# Patient Record
Sex: FEMALE | Race: WHITE | HISPANIC OR LATINO | Employment: OTHER | URBAN - METROPOLITAN AREA
[De-identification: names, ages, dates, MRNs, and addresses within clinical notes are randomized per-mention and may not be internally consistent; named-entity substitution may affect disease eponyms.]

---

## 2018-12-05 ENCOUNTER — OFFICE VISIT (OUTPATIENT)
Dept: FAMILY MEDICINE CLINIC | Facility: CLINIC | Age: 58
End: 2018-12-05
Payer: COMMERCIAL

## 2018-12-05 VITALS
BODY MASS INDEX: 17.19 KG/M2 | HEART RATE: 65 BPM | WEIGHT: 97 LBS | RESPIRATION RATE: 16 BRPM | OXYGEN SATURATION: 98 % | DIASTOLIC BLOOD PRESSURE: 106 MMHG | HEIGHT: 63 IN | TEMPERATURE: 96.3 F | SYSTOLIC BLOOD PRESSURE: 160 MMHG

## 2018-12-05 DIAGNOSIS — Z12.31 ENCOUNTER FOR SCREENING MAMMOGRAM FOR BREAST CANCER: ICD-10-CM

## 2018-12-05 DIAGNOSIS — Z23 NEED FOR VACCINATION: ICD-10-CM

## 2018-12-05 DIAGNOSIS — Z98.890 STATUS POST LUMBAR SPINE SURGERY FOR DECOMPRESSION OF SPINAL CORD: ICD-10-CM

## 2018-12-05 DIAGNOSIS — R63.6 UNDERWEIGHT: ICD-10-CM

## 2018-12-05 DIAGNOSIS — Z00.01 ENCOUNTER FOR WELL ADULT EXAM WITH ABNORMAL FINDINGS: ICD-10-CM

## 2018-12-05 DIAGNOSIS — I10 ESSENTIAL HYPERTENSION: ICD-10-CM

## 2018-12-05 DIAGNOSIS — N39.42 URINARY INCONTINENCE WITHOUT SENSORY AWARENESS: ICD-10-CM

## 2018-12-05 DIAGNOSIS — M62.561 ATROPHY OF MUSCLE OF RIGHT LOWER LEG: ICD-10-CM

## 2018-12-05 DIAGNOSIS — Z76.89 ENCOUNTER TO ESTABLISH CARE: Primary | ICD-10-CM

## 2018-12-05 PROCEDURE — 99386 PREV VISIT NEW AGE 40-64: CPT | Performed by: FAMILY MEDICINE

## 2018-12-05 PROCEDURE — 3725F SCREEN DEPRESSION PERFORMED: CPT | Performed by: FAMILY MEDICINE

## 2018-12-05 RX ORDER — ATENOLOL 100 MG/1
100 TABLET ORAL DAILY
COMMUNITY
End: 2018-12-05 | Stop reason: ALTCHOICE

## 2018-12-05 RX ORDER — METOPROLOL SUCCINATE 100 MG/1
100 TABLET, EXTENDED RELEASE ORAL DAILY
Qty: 30 TABLET | Refills: 2 | Status: SHIPPED | OUTPATIENT
Start: 2018-12-05 | End: 2019-08-05 | Stop reason: SDUPTHER

## 2018-12-05 RX ORDER — METOPROLOL SUCCINATE 100 MG/1
100 TABLET, EXTENDED RELEASE ORAL DAILY
COMMUNITY
End: 2018-12-05 | Stop reason: SDUPTHER

## 2018-12-05 NOTE — ASSESSMENT & PLAN NOTE
Patient states she had a surgery about 15 years ago due to spinal cord compression and she developed sensation changes in the right lower extremity since then  She developed muscle atrophy and deformity in her right food gradually  The developed deformity effects patient's gait and walking  Will refer patient to physical therapy for evaluation of the gait, and she may be candidate for modified ankle/ foot orthotics  Follow-up in 3 months

## 2018-12-05 NOTE — ASSESSMENT & PLAN NOTE
Patient is establishing care, she was on atenolol and metoprolol that was started in [facility] as per patient  Will discontinue atenolol as both medications are the same group, continue metoprolol for now  Blood pressure currently controlled   No complaints of adverse effects, including visual changes, dizziness, headaches or syncope  Encouraged diet and exercise regimen as tolerated

## 2018-12-05 NOTE — PROGRESS NOTES
Assessment/Plan:    Essential hypertension  Patient is establishing care, she was on atenolol and metoprolol that was started in [place] as per patient  Will discontinue atenolol as both medications are the same group, continue metoprolol for now  Blood pressure currently controlled   No complaints of adverse effects, including visual changes, dizziness, headaches or syncope  Encouraged diet and exercise regimen as tolerated      Atrophy of muscle of right lower leg  Patient states she had a surgery about 15 years ago due to spinal cord compression and she developed sensation changes in the right lower extremity since then  She developed muscle atrophy and deformity in her right food gradually  The developed deformity effects patient's gait and walking  Will refer patient to physical therapy for evaluation of the gait, and she may be candidate for modified ankle/ foot orthotics  Follow-up in 3 months      Urinary incontinence without sensory awareness  Patient is status post spinal surgery about 15 years ago and states that she developed urinary incontinence since then, she is not able to hold her urine most of the times, and not able to make it to the bathroom in time  Scheduled urination hourly discussed with the patient    Encounter for well adult exam with abnormal findings  For patient establishing care after moving from [facility] about 9 months ago  She has been seen by a doctor for many years as per patient  She is status post spinal surgery, seems like due to spinal compression, as per patient's she developed urinary and defecation problems and loss of right foot sensation after the surgery  Will order mammogram and Pap test as patient does remember when was the last time she had those done  Patient is underweight, will check CBC, TSH, CMP, lipid panel to look for possible underlying cause       Diagnoses and all orders for this visit:    Encounter to establish care    Need for vaccination  -     TDAP VACCINE GREATER THAN OR EQUAL TO 6YO IM  -     influenza vaccine, 2478-5315, quadrivalent, recombinant, PF, 0 5 mL, for patients 18 yr+ (FLUBLOK)    Encounter for screening mammogram for breast cancer  -     Mammo screening bilateral w cad; Future    Status post lumbar spine surgery for decompression of spinal cord  -     Ambulatory referral to Physical Therapy; Future    Atrophy of muscle of right lower leg  -     Ambulatory referral to Physical Therapy; Future    Underweight  -     CBC and differential; Future  -     Lipid panel; Future  -     TSH, 3rd generation with Free T4 reflex; Future  -     Comprehensive metabolic panel; Future    Urinary incontinence without sensory awareness    Essential hypertension  -     metoprolol succinate (TOPROL-XL) 100 mg 24 hr tablet; Take 1 tablet (100 mg total) by mouth daily    Encounter for well adult exam with abnormal findings    Other orders  -     Discontinue: metoprolol succinate (TOPROL-XL) 100 mg 24 hr tablet; Take 100 mg by mouth daily  -     Discontinue: atenolol (TENORMIN) 100 mg tablet; Take 100 mg by mouth daily          Subjective:      Patient ID: Steve Phillips is a 66 y o  female  Patient presents to [facility] care  She moved from [facility] in March 2018 and she is Kiswahili speaking , but history is provided by her   They state that about 15 years ago patient had a spinal surgery because she had urinary retention and was found to have spinal cord compression  She states that since then she developed problem with urination and moving her bowel; she does not have sensation when to move her bowel, but she goes to the bathroom 2 times a day on her own without urgency  She also has problem with urination, with no urgency to urinate and decreased sensation  She starts to urinate spontaneously, but able to stop and then she goes to the bathroom  She did not follow up and she was not seen by a doctor after the surgery     Post surgery she also developed sensation changes in her right foot, she lost sensation to the touch from ankle and below, pain sensation is intact, there is muscle atrophy in the right foot  Patient does remember when she had a last mammogram and Pap test done  She denies smoking, alcohol, drug use  The other medical history include hypertension, patient was on atenolol and metoprolol started in [place] 15 years ago as per patient  The following portions of the patient's history were reviewed and updated as appropriate: allergies, current medications, past family history, past medical history, past social history, past surgical history and problem list     Review of Systems   Constitutional: Negative for chills, diaphoresis, fatigue and fever  HENT: Negative for congestion, ear discharge, ear pain, mouth sores, rhinorrhea, sore throat and trouble swallowing  Eyes: Negative for photophobia, pain and discharge  Respiratory: Negative for cough, chest tightness, shortness of breath and wheezing  Cardiovascular: Negative for chest pain, palpitations and leg swelling  Gastrointestinal: Negative for abdominal distention, abdominal pain, blood in stool, constipation, diarrhea and nausea  Genitourinary: Positive for difficulty urinating  Negative for frequency  Musculoskeletal: Negative for joint swelling and neck stiffness  Skin: Negative for color change, pallor and rash  Neurological: Positive for weakness (right foot) and numbness (no sensation in the right foot below the ankle)  Negative for dizziness, syncope and headaches  Objective:      BP (!) 160/106 (BP Location: Right arm, Patient Position: Sitting, Cuff Size: Child)   Pulse 65   Temp (!) 96 3 °F (35 7 °C) (Tympanic)   Resp 16   Ht 5' 3" (1 6 m)   Wt 44 kg (97 lb)   SpO2 98%   Breastfeeding? No   BMI 17 18 kg/m²          Physical Exam   Constitutional: She is oriented to person, place, and time   She appears well-developed and well-nourished  No distress  HENT:   Head: Normocephalic and atraumatic  Eyes: Pupils are equal, round, and reactive to light  EOM are normal  No scleral icterus  Neck: Normal range of motion  Neck supple  Cardiovascular: Normal rate, regular rhythm and normal heart sounds  Exam reveals no gallop and no friction rub  No murmur heard  Pulmonary/Chest: Effort normal and breath sounds normal  No respiratory distress  She has no wheezes  She has no rales  She exhibits no tenderness  Abdominal: Soft  Bowel sounds are normal  She exhibits no distension  There is no tenderness  There is no rebound  Musculoskeletal: She exhibits no edema or tenderness  Right foot: There is normal range of motion, no tenderness, no swelling and no laceration  Left foot: Normal         Feet:    Lymphadenopathy:     She has no cervical adenopathy  Neurological: She is alert and oriented to person, place, and time  Skin: Skin is warm and dry  No rash noted  No erythema  No pallor  Psychiatric: She has a normal mood and affect

## 2018-12-05 NOTE — ASSESSMENT & PLAN NOTE
Patient is status post spinal surgery about 15 years ago and states that she developed urinary incontinence since then, she is not able to hold her urine most of the times, and not able to make it to the bathroom in time  Scheduled urination hourly discussed with the patient

## 2018-12-05 NOTE — ASSESSMENT & PLAN NOTE
For patient establishing care after moving from [facility] about 9 months ago  She has been seen by a doctor for many years as per patient  She is status post spinal surgery, seems like due to spinal compression, as per patient's she developed urinary and defecation problems and loss of right foot sensation after the surgery  Will order mammogram and Pap test as patient does remember when was the last time she had those done  Patient is underweight, will check CBC, TSH, CMP, lipid panel to look for possible underlying cause

## 2019-01-07 ENCOUNTER — EVALUATION (OUTPATIENT)
Dept: PHYSICAL THERAPY | Facility: REHABILITATION | Age: 59
End: 2019-01-07
Payer: COMMERCIAL

## 2019-01-07 DIAGNOSIS — M62.561 ATROPHY OF MUSCLE OF RIGHT LOWER LEG: ICD-10-CM

## 2019-01-07 DIAGNOSIS — Z98.890 STATUS POST LUMBAR SPINE SURGERY FOR DECOMPRESSION OF SPINAL CORD: ICD-10-CM

## 2019-01-07 PROCEDURE — 97163 PT EVAL HIGH COMPLEX 45 MIN: CPT | Performed by: PHYSICAL THERAPIST

## 2019-01-07 PROCEDURE — G8978 MOBILITY CURRENT STATUS: HCPCS | Performed by: PHYSICAL THERAPIST

## 2019-01-07 PROCEDURE — G8979 MOBILITY GOAL STATUS: HCPCS | Performed by: PHYSICAL THERAPIST

## 2019-01-07 NOTE — PROGRESS NOTES
PHYSICAL THERAPY EVALUATION    SUBJECTIVE:  HPI: Debbie Mohr is a 66 y o  female referred to outpatient physical therapy for the following diagnosis   Encounter Diagnoses   Name Primary?  Status post lumbar spine surgery for decompression of spinal cord     Atrophy of muscle of right lower leg        Trish Loya MD     Patient speaks Macedonian but present with  who translates to relate history  Patient relates history of pain in the right foot going back to 15 years  Patient reports acute onset and denied trauma  She had lumbar surgery for this issue, describes implantation of morcellized bone  Around the same time, 15 years ago, patient began with urinary changes, urinary urgency  She wore a Weeks catheter at some point  Patient currently reports numbness about the whole distal right lower extremity  Her right toes continue to hammer and cramp, present since 15 years ago  Patient has no history of botox, no pain medication or medication for this issue  Patient reports pain to the right posterior knee, burning  She denies provocation or alleviation with movements or activity  Patient notes limitation in distance of walking  Patient goals: move her toes better, less pain, have toes straighter in shoes  Past Medical History:   Diagnosis Date    Hypertension        Current Outpatient Prescriptions:     metoprolol succinate (TOPROL-XL) 100 mg 24 hr tablet, Take 1 tablet (100 mg total) by mouth daily, Disp: 30 tablet, Rfl: 2    OBJECTIVE:  Patient with significant hammering of toes 1-5 on the right side  She is getting some calluses on the dorsal side of the toes  Significant increase in medial arch height of the right and left feet  Patient notes the right foot seems shorter than the left  Non-tender to palpation about the forefoot, midfoot and hindfoot  Slight increase in hindfoot eversion with standing and walking, on the right side, but mild    Patient notes catching bottom of toes on floor when walking barefoot  Ankle, knee and hip range of motion within normal limits  Mild limitation at least with passive PIP and DIP extension of right toes 2-5, some limitation in passive first toe extension  Left within normal limits  Manual Muscle Testing:    RIGHT LEFT   Hip Flexion 4/5 4/5   Hip Abduction 5/5 5/5   Knee Flexion 4/5 4/5   Knee Extension 5-/5 5-/5   Dorsiflexion 4+/5 5-/5    Plantarflexion 4/5 4/5     4/5 inversion and eversion on the right    Unable to feel 10g monofilament right, present on L    10 2 seconds 5 Times Sit to Stand (17 inch chair, arms across chest)  11 5 seconds 10 Meter Walk Test   335 feet 2 Minute Walk Test  Patient walks with minimal decrease in right foot clearance during swing phase gait  During stance, decreased second rocker and the right knee moves quickly to full extension  Unable to appreciate change from normal muscle tone with hip and knee muscles  No clonus  ASSESSMENT:  Patient with chronic changes to right foot posturing consistent with chronic myopathy  She appears to have increased muscle tone about the right toe flexors and possibly somewhat about the right ankle inverters and plantarflexors  Given chronicity, recommend consultation with physiatrist to address spasming and cramping of the right foot  Patient would also likely benefit from [facility] (likely carbon fiber) to better position the foot during swing phase gait, and help control the right knee during stance phase gait  Patient is seeing physician for chronic problems with urination, as this also likely was related to lumbar pathology and surgery  Patient's phone is [phone] once referrals are obtained  SHORT-TERM GOALS: 1 months  1  Patient independently demonstrates stretching and activity to help reduce tightness in the right foot  2  Patient walks 10 meters in 10 seconds  3  Patient reports increase in distance of walking      LONG-TERM GOALS: 3 months  1  Patient walks at least 1200 feet in 6 minute walk test   2  Patient reports 50% reduction in foot pain  Precautions - none    Specialty Daily Treatment Diary     Exercise Diary  1/07/19     Endurance      Static and dynamic balance      Lower extremity strengthening Heel raise, 10       stretching Standing R calf stretch 30 sec   Seated R MTP and gastroc stretch, 1 5 min             PLAN OF CARE:  Patient will benefit from physical therapy 1-2 times per week for 3 months  Neuromuscular re-education and therapeutic exercises as outlined in grids      Husam Hall, PT  1/7/2019

## 2019-01-10 ENCOUNTER — TELEPHONE (OUTPATIENT)
Dept: FAMILY MEDICINE CLINIC | Facility: CLINIC | Age: 59
End: 2019-01-10

## 2019-01-10 DIAGNOSIS — Z98.890 STATUS POST LUMBAR SPINE SURGERY FOR DECOMPRESSION OF SPINAL CORD: Primary | ICD-10-CM

## 2019-01-15 ENCOUNTER — TELEPHONE (OUTPATIENT)
Dept: FAMILY MEDICINE CLINIC | Facility: CLINIC | Age: 59
End: 2019-01-15

## 2019-01-15 NOTE — TELEPHONE ENCOUNTER
Tisha fonseca neuro called requestingPhysicians order for right foot pain lightly increased muscle tone      Appointment 1/17/2019     Tisha fonseca neuro [phone]    [facility]#[phone]

## 2019-01-16 ENCOUNTER — APPOINTMENT (OUTPATIENT)
Dept: LAB | Facility: HOSPITAL | Age: 59
End: 2019-01-16
Payer: COMMERCIAL

## 2019-01-16 ENCOUNTER — TRANSCRIBE ORDERS (OUTPATIENT)
Dept: NEUROLOGY | Facility: CLINIC | Age: 59
End: 2019-01-16

## 2019-01-16 DIAGNOSIS — R63.6 UNDERWEIGHT: ICD-10-CM

## 2019-01-16 DIAGNOSIS — Z98.890 OTHER SPECIFIED POSTPROCEDURAL STATES: Primary | ICD-10-CM

## 2019-01-16 LAB
ALBUMIN SERPL BCP-MCNC: 4.3 G/DL (ref 3–5.2)
ALP SERPL-CCNC: 69 U/L (ref 43–122)
ALT SERPL W P-5'-P-CCNC: 29 U/L (ref 9–52)
ANION GAP SERPL CALCULATED.3IONS-SCNC: 9 MMOL/L (ref 5–14)
AST SERPL W P-5'-P-CCNC: 30 U/L (ref 14–36)
BASOPHILS # BLD AUTO: 0 THOUSANDS/ΜL (ref 0–0.1)
BASOPHILS NFR BLD AUTO: 1 % (ref 0–1)
BILIRUB SERPL-MCNC: 0.4 MG/DL
BUN SERPL-MCNC: 18 MG/DL (ref 5–25)
CALCIUM SERPL-MCNC: 9.4 MG/DL (ref 8.4–10.2)
CHLORIDE SERPL-SCNC: 102 MMOL/L (ref 97–108)
CHOLEST SERPL-MCNC: 185 MG/DL
CO2 SERPL-SCNC: 29 MMOL/L (ref 22–30)
CREAT SERPL-MCNC: 0.75 MG/DL (ref 0.6–1.2)
EOSINOPHIL # BLD AUTO: 0.2 THOUSAND/ΜL (ref 0–0.4)
EOSINOPHIL NFR BLD AUTO: 4 % (ref 0–6)
ERYTHROCYTE [DISTWIDTH] IN BLOOD BY AUTOMATED COUNT: 12.6 %
GFR SERPL CREATININE-BSD FRML MDRD: 89 ML/MIN/1.73SQ M
GLUCOSE P FAST SERPL-MCNC: 95 MG/DL (ref 70–99)
HCT VFR BLD AUTO: 43.1 % (ref 36–46)
HDLC SERPL-MCNC: 65 MG/DL (ref 40–59)
HGB BLD-MCNC: 13.7 G/DL (ref 12–16)
LDLC SERPL CALC-MCNC: 108 MG/DL
LYMPHOCYTES # BLD AUTO: 1.5 THOUSANDS/ΜL (ref 0.5–4)
LYMPHOCYTES NFR BLD AUTO: 32 % (ref 25–45)
MCH RBC QN AUTO: 27.6 PG (ref 26–34)
MCHC RBC AUTO-ENTMCNC: 31.7 G/DL (ref 31–36)
MCV RBC AUTO: 87 FL (ref 80–100)
MONOCYTES # BLD AUTO: 0.5 THOUSAND/ΜL (ref 0.2–0.9)
MONOCYTES NFR BLD AUTO: 10 % (ref 1–10)
NEUTROPHILS # BLD AUTO: 2.5 THOUSANDS/ΜL (ref 1.8–7.8)
NEUTS SEG NFR BLD AUTO: 53 % (ref 45–65)
NONHDLC SERPL-MCNC: 120 MG/DL
PLATELET # BLD AUTO: 322 THOUSANDS/UL (ref 150–450)
PMV BLD AUTO: 8.8 FL (ref 8.9–12.7)
POTASSIUM SERPL-SCNC: 3.8 MMOL/L (ref 3.6–5)
PROT SERPL-MCNC: 7.7 G/DL (ref 5.9–8.4)
RBC # BLD AUTO: 4.96 MILLION/UL (ref 4–5.2)
SODIUM SERPL-SCNC: 140 MMOL/L (ref 137–147)
TRIGL SERPL-MCNC: 58 MG/DL
TSH SERPL DL<=0.05 MIU/L-ACNC: 1.7 UIU/ML (ref 0.47–4.68)
WBC # BLD AUTO: 4.8 THOUSAND/UL (ref 4.5–11)

## 2019-01-16 PROCEDURE — 36415 COLL VENOUS BLD VENIPUNCTURE: CPT

## 2019-01-16 PROCEDURE — 85025 COMPLETE CBC W/AUTO DIFF WBC: CPT

## 2019-01-16 PROCEDURE — 80061 LIPID PANEL: CPT

## 2019-01-16 PROCEDURE — 80053 COMPREHEN METABOLIC PANEL: CPT

## 2019-01-16 PROCEDURE — 84443 ASSAY THYROID STIM HORMONE: CPT

## 2019-01-21 ENCOUNTER — APPOINTMENT (OUTPATIENT)
Dept: PHYSICAL THERAPY | Facility: REHABILITATION | Age: 59
End: 2019-01-21
Payer: COMMERCIAL

## 2019-03-08 ENCOUNTER — ANNUAL EXAM (OUTPATIENT)
Dept: FAMILY MEDICINE CLINIC | Facility: CLINIC | Age: 59
End: 2019-03-08

## 2019-03-08 VITALS
HEART RATE: 61 BPM | OXYGEN SATURATION: 98 % | SYSTOLIC BLOOD PRESSURE: 128 MMHG | WEIGHT: 103 LBS | BODY MASS INDEX: 18.25 KG/M2 | TEMPERATURE: 97.2 F | DIASTOLIC BLOOD PRESSURE: 80 MMHG | RESPIRATION RATE: 16 BRPM

## 2019-03-08 DIAGNOSIS — N63.0 BREAST LUMP IN FEMALE: ICD-10-CM

## 2019-03-08 DIAGNOSIS — Z12.31 ENCOUNTER FOR SCREENING MAMMOGRAM FOR BREAST CANCER: ICD-10-CM

## 2019-03-08 DIAGNOSIS — Z01.411 ENCOUNTER FOR GYNECOLOGICAL EXAMINATION WITH ABNORMAL FINDING: Primary | ICD-10-CM

## 2019-03-08 PROCEDURE — 99396 PREV VISIT EST AGE 40-64: CPT | Performed by: FAMILY MEDICINE

## 2019-03-08 PROCEDURE — 87624 HPV HI-RISK TYP POOLED RSLT: CPT | Performed by: FAMILY MEDICINE

## 2019-03-08 PROCEDURE — G0124 SCREEN C/V THIN LAYER BY MD: HCPCS | Performed by: FAMILY MEDICINE

## 2019-03-08 PROCEDURE — G0145 SCR C/V CYTO,THINLAYER,RESCR: HCPCS | Performed by: FAMILY MEDICINE

## 2019-03-09 NOTE — PROGRESS NOTES
ANNUAL GYNECOLOGICAL EXAMINATION    Janae Fortune is a 66 y o  female who presents today for annual GYN exam   Her last pap smear was performed couple years ago, patient is not able to provide accurate time  She reports no history of abnormal pap smears in her past  She is sexually active, no contraception used   Patient is postmenopausal, LMP was couple years ago, patient not able to answer the exact age  She denies any vaginal bleeding, discharge, itching or other complains  Her general medical history has been reviewed and she reports it as follows:    Past Medical History:   Diagnosis Date    Hypertension      Past Surgical History:   Procedure Laterality Date     SECTION      SPINE SURGERY       OB History        3    Para   3    Term                AB        Living           SAB        TAB        Ectopic        Multiple        Live Births                   Social History     Tobacco Use    Smoking status: Never Smoker    Smokeless tobacco: Never Used   Substance Use Topics    Alcohol use: No    Drug use: No     Cancer-related family history includes Cancer in her brother and mother  Current Outpatient Medications:     metoprolol succinate (TOPROL-XL) 100 mg 24 hr tablet, Take 1 tablet (100 mg total) by mouth daily, Disp: 30 tablet, Rfl: 2    Review of Systems:  Review of Systems   Constitutional: Negative for activity change, fatigue, fever and unexpected weight change  Cardiovascular: Negative for chest pain  Gastrointestinal: Negative for abdominal pain  Genitourinary: Negative for decreased urine volume, difficulty urinating, dyspareunia, dysuria, flank pain, frequency, hematuria, pelvic pain, urgency, vaginal bleeding, vaginal discharge and vaginal pain  Musculoskeletal: Negative for arthralgias and back pain  Skin: Negative for color change  Neurological: Negative for dizziness, weakness and headaches  Hematological: Negative for adenopathy  Physical Exam:  Physical Exam   Constitutional: She appears well-developed  HENT:   Head: Normocephalic  Neck: Normal range of motion  Cardiovascular: Normal rate  Pulmonary/Chest: Effort normal  No respiratory distress  Abdominal: Soft  She exhibits no distension  There is no tenderness  Genitourinary: Vagina normal  Pelvic exam was performed with patient supine  There is no rash, tenderness or injury on the right labia  There is no rash, tenderness or injury on the left labia  Uterus is not tender  Cervix exhibits friability  Right adnexum displays no tenderness  Left adnexum displays no tenderness  No erythema, tenderness or bleeding in the vagina  No vaginal discharge found  Skin: Skin is warm and dry  Psychiatric: She has a normal mood and affect  Assessment:   1  Annual GYN exam with abnormal findings  2  Lump in the right breast    Plan:   1  Pap smear done with HPV co-testing reflex  2  Imaging ordered: diagnostic mammogram bilateral, ultrasound of the right breast        Return to office in 3 month for follow up on chronic condition

## 2019-03-12 LAB
HPV HR 12 DNA CVX QL NAA+PROBE: NEGATIVE
HPV16 DNA CVX QL NAA+PROBE: NEGATIVE
HPV18 DNA CVX QL NAA+PROBE: NEGATIVE
LAB AP GYN PRIMARY INTERPRETATION: NORMAL
Lab: NORMAL

## 2019-04-12 ENCOUNTER — TRANSCRIBE ORDERS (OUTPATIENT)
Dept: ADMINISTRATIVE | Facility: HOSPITAL | Age: 59
End: 2019-04-12

## 2019-04-12 DIAGNOSIS — N63.10 LUMP OF RIGHT BREAST: Primary | ICD-10-CM

## 2019-04-16 ENCOUNTER — HOSPITAL ENCOUNTER (OUTPATIENT)
Dept: MAMMOGRAPHY | Facility: CLINIC | Age: 59
Discharge: HOME | End: 2019-04-16
Payer: COMMERCIAL

## 2019-04-16 ENCOUNTER — HOSPITAL ENCOUNTER (OUTPATIENT)
Dept: ULTRASOUND IMAGING | Facility: HOSPITAL | Age: 59
Discharge: HOME | End: 2019-04-16
Payer: COMMERCIAL

## 2019-04-16 VITALS — HEIGHT: 63 IN | WEIGHT: 103 LBS | BODY MASS INDEX: 18.25 KG/M2

## 2019-04-16 DIAGNOSIS — N63.10 LUMP OF RIGHT BREAST: ICD-10-CM

## 2019-04-16 DIAGNOSIS — N63.0 BREAST LUMP IN FEMALE: ICD-10-CM

## 2019-04-16 PROCEDURE — 76642 ULTRASOUND BREAST LIMITED: CPT

## 2019-04-16 PROCEDURE — 77066 DX MAMMO INCL CAD BI: CPT

## 2019-04-25 ENCOUNTER — TELEPHONE (OUTPATIENT)
Dept: FAMILY MEDICINE CLINIC | Facility: CLINIC | Age: 59
End: 2019-04-25

## 2019-05-02 ENCOUNTER — TELEPHONE (OUTPATIENT)
Dept: FAMILY MEDICINE CLINIC | Facility: CLINIC | Age: 59
End: 2019-05-02

## 2019-05-03 DIAGNOSIS — I10 ESSENTIAL HYPERTENSION: Primary | ICD-10-CM

## 2019-05-03 RX ORDER — LOSARTAN POTASSIUM 100 MG/1
100 TABLET ORAL DAILY
COMMUNITY
End: 2019-05-03 | Stop reason: SDUPTHER

## 2019-05-03 RX ORDER — LOSARTAN POTASSIUM 100 MG/1
100 TABLET ORAL DAILY
Qty: 30 TABLET | Refills: 0 | Status: SHIPPED | OUTPATIENT
Start: 2019-05-03 | End: 2019-08-05 | Stop reason: SDUPTHER

## 2019-05-03 RX ORDER — AMLODIPINE BESYLATE 5 MG/1
5 TABLET ORAL DAILY
Qty: 30 TABLET | Refills: 0 | Status: SHIPPED | OUTPATIENT
Start: 2019-05-03 | End: 2019-08-05 | Stop reason: SDUPTHER

## 2019-05-03 RX ORDER — AMLODIPINE BESYLATE 5 MG/1
5 TABLET ORAL DAILY
COMMUNITY
End: 2019-05-03 | Stop reason: SDUPTHER

## 2019-08-05 ENCOUNTER — OFFICE VISIT (OUTPATIENT)
Dept: FAMILY MEDICINE CLINIC | Facility: CLINIC | Age: 59
End: 2019-08-05

## 2019-08-05 VITALS
WEIGHT: 96.3 LBS | HEIGHT: 63 IN | RESPIRATION RATE: 16 BRPM | BODY MASS INDEX: 17.06 KG/M2 | HEART RATE: 61 BPM | OXYGEN SATURATION: 99 % | TEMPERATURE: 97.4 F | SYSTOLIC BLOOD PRESSURE: 172 MMHG | DIASTOLIC BLOOD PRESSURE: 114 MMHG

## 2019-08-05 DIAGNOSIS — R06.02 SHORTNESS OF BREATH: ICD-10-CM

## 2019-08-05 DIAGNOSIS — Z12.11 SCREEN FOR COLON CANCER: ICD-10-CM

## 2019-08-05 DIAGNOSIS — I10 ESSENTIAL HYPERTENSION: Primary | ICD-10-CM

## 2019-08-05 DIAGNOSIS — M79.604 PAIN OF RIGHT LOWER EXTREMITY: ICD-10-CM

## 2019-08-05 DIAGNOSIS — R63.6 UNDERWEIGHT: ICD-10-CM

## 2019-08-05 DIAGNOSIS — N63.10 MASS OF RIGHT BREAST: ICD-10-CM

## 2019-08-05 DIAGNOSIS — R00.2 PALPITATION: ICD-10-CM

## 2019-08-05 DIAGNOSIS — L30.8 OTHER ECZEMA: ICD-10-CM

## 2019-08-05 PROBLEM — L30.9 ECZEMA: Status: ACTIVE | Noted: 2019-08-05

## 2019-08-05 PROCEDURE — 99214 OFFICE O/P EST MOD 30 MIN: CPT | Performed by: INTERNAL MEDICINE

## 2019-08-05 PROCEDURE — 93000 ELECTROCARDIOGRAM COMPLETE: CPT | Performed by: INTERNAL MEDICINE

## 2019-08-05 RX ORDER — CALORIC SUPPLEMENT
1 LIQUID (ML) ORAL DAILY
Qty: 30 BOTTLE | Refills: 2 | Status: SHIPPED | OUTPATIENT
Start: 2019-08-05 | End: 2021-10-27 | Stop reason: ALTCHOICE

## 2019-08-05 RX ORDER — LOSARTAN POTASSIUM 100 MG/1
100 TABLET ORAL DAILY
Qty: 30 TABLET | Refills: 3 | Status: SHIPPED | OUTPATIENT
Start: 2019-08-05 | End: 2019-11-25 | Stop reason: SDUPTHER

## 2019-08-05 RX ORDER — AMLODIPINE BESYLATE 5 MG/1
5 TABLET ORAL DAILY
Qty: 30 TABLET | Refills: 3 | Status: SHIPPED | OUTPATIENT
Start: 2019-08-05 | End: 2019-11-25 | Stop reason: SDUPTHER

## 2019-08-05 RX ORDER — METOPROLOL SUCCINATE 100 MG/1
100 TABLET, EXTENDED RELEASE ORAL DAILY
Qty: 30 TABLET | Refills: 3 | Status: SHIPPED | OUTPATIENT
Start: 2019-08-05 | End: 2019-11-25 | Stop reason: SDUPTHER

## 2019-08-05 RX ORDER — SENNOSIDES 8.6 MG
650 CAPSULE ORAL EVERY 8 HOURS PRN
Qty: 30 TABLET | Refills: 3 | Status: SHIPPED | OUTPATIENT
Start: 2019-08-05 | End: 2020-03-27 | Stop reason: SDUPTHER

## 2019-08-05 RX ORDER — DIAPER,BRIEF,INFANT-TODD,DISP
EACH MISCELLANEOUS 2 TIMES DAILY
Qty: 30 G | Refills: 3 | Status: SHIPPED | OUTPATIENT
Start: 2019-08-05 | End: 2021-03-10 | Stop reason: ALTCHOICE

## 2019-08-05 NOTE — ASSESSMENT & PLAN NOTE
Patient stated that she progressively losing weight and has decreased appetite the last few months, she lost 7 lb in 5 months, but she has been always thin   No red flags at this time  Blood work from the previous visit reviewed, reassuring  Will refer for colonoscopy as she is due for one  Ensure ordered to help increase her calories intake

## 2019-08-05 NOTE — ASSESSMENT & PLAN NOTE
Patient has been having intermittent palpitations for the last couple months, usually about 1 to 2 times a week that last about 1-2 minutes  She denies any chest pain at present  EKG performed in the office, normal sinus rhythm, the OK-0 18, QRS-0 08, QT-0 4  Will order ECHO  Follow up in 3 months

## 2019-08-05 NOTE — ASSESSMENT & PLAN NOTE
Patient has not been taking her medication for about a week as she ran out  Will refer her amlodipine, losartan, and metoprolol  Follow-up in 3 months

## 2019-08-05 NOTE — PROGRESS NOTES
Assessment/Plan:    Palpitation  Patient has been having intermittent palpitations for the last couple months, usually about 1 to 2 times a week that last about 1-2 minutes  She denies any chest pain at present  EKG performed in the office, normal sinus rhythm, the NM-0 18, QRS-0 08, QT-0 4  Will order ECHO  Follow up in 3 months    Essential hypertension  Patient has not been taking her medication for about a week as she ran out  Will refer her amlodipine, losartan, and metoprolol  Follow-up in 3 months    Underweight  Patient stated that she progressively losing weight and has decreased appetite the last few months, she lost 7 lb in 5 months, but she has been always thin  No red flags at this time  Blood work from the previous visit reviewed, reassuring  Will refer for colonoscopy as she is due for one  Ensure ordered to help increase her calories intake       Diagnoses and all orders for this visit:    Essential hypertension  -     metoprolol succinate (TOPROL-XL) 100 mg 24 hr tablet; Take 1 tablet (100 mg total) by mouth daily  -     losartan (COZAAR) 100 MG tablet; Take 1 tablet (100 mg total) by mouth daily  -     amLODIPine (NORVASC) 5 mg tablet; Take 1 tablet (5 mg total) by mouth daily    Mass of right breast  -     US breast right limited (diagnostic); Future    Other eczema  -     hydrocortisone 1 % ointment; Apply topically 2 (two) times a day    Pain of right lower extremity  -     acetaminophen (TYLENOL) 650 mg CR tablet; Take 1 tablet (650 mg total) by mouth every 8 (eight) hours as needed for mild pain    Palpitation  -     POCT ECG  -     Echo complete with contrast if indicated; Future    Shortness of breath  -     Echo complete with contrast if indicated; Future    Underweight  -     Nutritional Supplements (ENSURE ORIGINAL) LIQD; Take 1 Bottle by mouth daily    Screen for colon cancer  -     Ambulatory referral to Gastroenterology;  Future          Subjective:      Patient ID: Crockett Robert is a 66 y o  female  Patient presents to follow-up on hypertension asking for refill of her medications  She complains today about decreased appetite and she noted that she is losing weight  Patient denies nausea, vomiting, blood in the stool, cough or other complaints  She denies any other new complaints at this moment  Patient seen and evaluated with presence of Dr Iván Jones  The following portions of the patient's history were reviewed and updated as appropriate: allergies, current medications, past family history, past medical history, past social history, past surgical history and problem list     Review of Systems   Constitutional: Positive for unexpected weight change  Negative for chills, diaphoresis, fatigue and fever  HENT: Negative for congestion, ear discharge, ear pain, mouth sores, rhinorrhea, sore throat and trouble swallowing  Eyes: Negative for photophobia, pain and discharge  Respiratory: Negative for cough, chest tightness, shortness of breath and wheezing  Cardiovascular: Negative for chest pain, palpitations and leg swelling  Gastrointestinal: Negative for abdominal distention, abdominal pain, blood in stool, constipation, diarrhea and nausea  Genitourinary: Negative for dysuria and frequency  Musculoskeletal: Negative for joint swelling and neck stiffness  Skin: Negative for color change, pallor and rash  Neurological: Positive for weakness (chronic in the right foot)  Negative for dizziness, syncope and headaches  Numbness: no sensation in the right foot below the ankle  Objective:      BP (!) 172/114 (BP Location: Right arm, Patient Position: Sitting, Cuff Size: Child)   Pulse 61   Temp (!) 97 4 °F (36 3 °C) (Tympanic)   Resp 16   Ht 5' 3" (1 6 m)   Wt 43 7 kg (96 lb 4 8 oz)   SpO2 99%   BMI 17 06 kg/m²          Physical Exam   Constitutional: She is oriented to person, place, and time  She appears well-developed and well-nourished  No distress  HENT:   Head: Normocephalic and atraumatic  Eyes: Pupils are equal, round, and reactive to light  EOM are normal  No scleral icterus  Neck: Normal range of motion  Neck supple  Cardiovascular: Normal rate, regular rhythm and normal heart sounds  Exam reveals no gallop and no friction rub  No murmur heard  Pulmonary/Chest: Effort normal and breath sounds normal  No respiratory distress  She has no wheezes  She has no rales  She exhibits no tenderness  Abdominal: Soft  Bowel sounds are normal  She exhibits no distension  There is no tenderness  There is no rebound  Musculoskeletal: She exhibits no edema or tenderness  Right foot: There is normal range of motion, no tenderness, no swelling and no laceration  Left foot: Normal         Feet:    Lymphadenopathy:     She has no cervical adenopathy  Neurological: She is alert and oriented to person, place, and time  Skin: Skin is warm and dry  No rash noted  No erythema  No pallor  Psychiatric: She has a normal mood and affect

## 2019-08-07 ENCOUNTER — TELEPHONE (OUTPATIENT)
Dept: FAMILY MEDICINE CLINIC | Facility: CLINIC | Age: 59
End: 2019-08-07

## 2019-08-07 NOTE — TELEPHONE ENCOUNTER
Tajik Int# [id number] gave pt info and mailed  Echo apt([phone]) is on 8/20/2019 at 8 am at [address] [facility]  US of breast is on 10/17/2019 at 9 am at [address]

## 2019-10-18 NOTE — TELEPHONE ENCOUNTER
PT no showed 10/17 [facility] for PT to call and r/s order mailed to address on file w/ schedling information    Echo r/s for 10/24 reminder letter sent for that appt as well

## 2019-10-24 ENCOUNTER — HOSPITAL ENCOUNTER (OUTPATIENT)
Dept: NON INVASIVE DIAGNOSTICS | Facility: HOSPITAL | Age: 59
Discharge: HOME | End: 2019-10-24
Payer: COMMERCIAL

## 2019-10-24 DIAGNOSIS — R00.2 PALPITATION: ICD-10-CM

## 2019-10-24 DIAGNOSIS — R06.02 SHORTNESS OF BREATH: ICD-10-CM

## 2019-10-24 PROCEDURE — 93306 TTE W/DOPPLER COMPLETE: CPT

## 2019-10-24 PROCEDURE — 93306 TTE W/DOPPLER COMPLETE: CPT | Performed by: INTERNAL MEDICINE

## 2019-10-29 ENCOUNTER — HOSPITAL ENCOUNTER (OUTPATIENT)
Dept: MAMMOGRAPHY | Facility: CLINIC | Age: 59
Discharge: HOME | End: 2019-10-29
Payer: COMMERCIAL

## 2019-10-29 DIAGNOSIS — N63.10 MASS OF RIGHT BREAST: ICD-10-CM

## 2019-10-29 PROCEDURE — 76642 ULTRASOUND BREAST LIMITED: CPT

## 2019-10-31 DIAGNOSIS — N63.10 MASS OF RIGHT BREAST: Primary | ICD-10-CM

## 2019-11-12 ENCOUNTER — HOSPITAL ENCOUNTER (EMERGENCY)
Facility: HOSPITAL | Age: 59
Discharge: HOME | End: 2019-11-12
Attending: EMERGENCY MEDICINE | Admitting: EMERGENCY MEDICINE
Payer: COMMERCIAL

## 2019-11-12 ENCOUNTER — APPOINTMENT (EMERGENCY)
Dept: RADIOLOGY | Facility: HOSPITAL | Age: 59
End: 2019-11-12
Payer: COMMERCIAL

## 2019-11-12 VITALS
DIASTOLIC BLOOD PRESSURE: 94 MMHG | SYSTOLIC BLOOD PRESSURE: 142 MMHG | BODY MASS INDEX: 16.99 KG/M2 | WEIGHT: 95.9 LBS | HEART RATE: 78 BPM | TEMPERATURE: 96.5 F | RESPIRATION RATE: 20 BRPM | OXYGEN SATURATION: 94 %

## 2019-11-12 DIAGNOSIS — J20.9 ACUTE BRONCHITIS: Primary | ICD-10-CM

## 2019-11-12 LAB
ALBUMIN SERPL BCP-MCNC: 5 G/DL (ref 3–5.2)
ALP SERPL-CCNC: 80 U/L (ref 43–122)
ALT SERPL W P-5'-P-CCNC: 21 U/L (ref 9–52)
ANION GAP SERPL CALCULATED.3IONS-SCNC: 9 MMOL/L (ref 5–14)
AST SERPL W P-5'-P-CCNC: 27 U/L (ref 14–36)
ATRIAL RATE: 78 BPM
BILIRUB SERPL-MCNC: 0.8 MG/DL
BUN SERPL-MCNC: 13 MG/DL (ref 5–25)
CALCIUM SERPL-MCNC: 9.8 MG/DL (ref 8.4–10.2)
CHLORIDE SERPL-SCNC: 100 MMOL/L (ref 97–108)
CO2 SERPL-SCNC: 29 MMOL/L (ref 22–30)
CREAT SERPL-MCNC: 0.56 MG/DL (ref 0.6–1.2)
EOSINOPHIL # BLD AUTO: 0.64 THOUSAND/UL (ref 0–0.4)
EOSINOPHIL NFR BLD MANUAL: 5 % (ref 0–6)
ERYTHROCYTE [DISTWIDTH] IN BLOOD BY AUTOMATED COUNT: 13.7 %
GFR SERPL CREATININE-BSD FRML MDRD: 104 ML/MIN/1.73SQ M
GLUCOSE SERPL-MCNC: 150 MG/DL (ref 70–99)
HCT VFR BLD AUTO: 42.8 % (ref 36–46)
HGB BLD-MCNC: 14.3 G/DL (ref 12–16)
LYMPHOCYTES # BLD AUTO: 2.54 THOUSAND/UL (ref 0.5–4)
LYMPHOCYTES # BLD AUTO: 20 % (ref 25–45)
MCH RBC QN AUTO: 28.1 PG (ref 26–34)
MCHC RBC AUTO-ENTMCNC: 33.5 G/DL (ref 31–36)
MCV RBC AUTO: 84 FL (ref 80–100)
MONOCYTES # BLD AUTO: 0.25 THOUSAND/UL (ref 0.2–0.9)
MONOCYTES NFR BLD AUTO: 2 % (ref 1–10)
NEUTS BAND NFR BLD MANUAL: 2 % (ref 0–8)
NEUTS SEG # BLD: 9.27 THOUSAND/UL (ref 1.8–7.8)
NEUTS SEG NFR BLD AUTO: 71 %
P AXIS: 81 DEGREES
PLATELET # BLD AUTO: 397 THOUSANDS/UL (ref 150–450)
PLATELET BLD QL SMEAR: ADEQUATE
PMV BLD AUTO: 7.8 FL (ref 8.9–12.7)
POTASSIUM SERPL-SCNC: 3.6 MMOL/L (ref 3.6–5)
PR INTERVAL: 166 MS
PROT SERPL-MCNC: 9 G/DL (ref 5.9–8.4)
QRS AXIS: 66 DEGREES
QRSD INTERVAL: 88 MS
QT INTERVAL: 400 MS
QTC INTERVAL: 456 MS
RBC # BLD AUTO: 5.09 MILLION/UL (ref 4–5.2)
RBC MORPH BLD: NORMAL
SODIUM SERPL-SCNC: 138 MMOL/L (ref 137–147)
T WAVE AXIS: 68 DEGREES
TOTAL CELLS COUNTED SPEC: 100
TROPONIN I SERPL-MCNC: <0.01 NG/ML (ref 0–0.03)
VENTRICULAR RATE: 78 BPM
WBC # BLD AUTO: 12.7 THOUSAND/UL (ref 4.5–11)

## 2019-11-12 PROCEDURE — 80053 COMPREHEN METABOLIC PANEL: CPT | Performed by: EMERGENCY MEDICINE

## 2019-11-12 PROCEDURE — 99285 EMERGENCY DEPT VISIT HI MDM: CPT | Performed by: EMERGENCY MEDICINE

## 2019-11-12 PROCEDURE — 93005 ELECTROCARDIOGRAM TRACING: CPT

## 2019-11-12 PROCEDURE — 84484 ASSAY OF TROPONIN QUANT: CPT | Performed by: EMERGENCY MEDICINE

## 2019-11-12 PROCEDURE — 99285 EMERGENCY DEPT VISIT HI MDM: CPT

## 2019-11-12 PROCEDURE — 71045 X-RAY EXAM CHEST 1 VIEW: CPT

## 2019-11-12 PROCEDURE — 36415 COLL VENOUS BLD VENIPUNCTURE: CPT | Performed by: EMERGENCY MEDICINE

## 2019-11-12 PROCEDURE — 93010 ELECTROCARDIOGRAM REPORT: CPT | Performed by: INTERNAL MEDICINE

## 2019-11-12 PROCEDURE — 85007 BL SMEAR W/DIFF WBC COUNT: CPT | Performed by: EMERGENCY MEDICINE

## 2019-11-12 PROCEDURE — 85027 COMPLETE CBC AUTOMATED: CPT | Performed by: EMERGENCY MEDICINE

## 2019-11-12 RX ORDER — ACETAMINOPHEN 325 MG/1
650 TABLET ORAL ONCE
Status: COMPLETED | OUTPATIENT
Start: 2019-11-12 | End: 2019-11-12

## 2019-11-12 RX ORDER — AZITHROMYCIN 250 MG/1
250 TABLET, FILM COATED ORAL EVERY 24 HOURS
Qty: 4 TABLET | Refills: 0 | Status: SHIPPED | OUTPATIENT
Start: 2019-11-12 | End: 2019-11-16

## 2019-11-12 RX ORDER — PROMETHAZINE HYDROCHLORIDE AND CODEINE PHOSPHATE 6.25; 1 MG/5ML; MG/5ML
5 SYRUP ORAL EVERY 4 HOURS PRN
Qty: 120 ML | Refills: 0 | Status: SHIPPED | OUTPATIENT
Start: 2019-11-12 | End: 2019-11-22

## 2019-11-12 RX ORDER — METHYLPREDNISOLONE SODIUM SUCCINATE 125 MG/2ML
INJECTION, POWDER, LYOPHILIZED, FOR SOLUTION INTRAMUSCULAR; INTRAVENOUS
Status: DISCONTINUED
Start: 2019-11-12 | End: 2019-11-12 | Stop reason: HOSPADM

## 2019-11-12 RX ORDER — PREDNISONE 20 MG/1
20 TABLET ORAL 2 TIMES DAILY WITH MEALS
Qty: 10 TABLET | Refills: 0 | Status: SHIPPED | OUTPATIENT
Start: 2019-11-12 | End: 2019-11-17

## 2019-11-12 RX ORDER — AZITHROMYCIN 250 MG/1
500 TABLET, FILM COATED ORAL ONCE
Status: COMPLETED | OUTPATIENT
Start: 2019-11-12 | End: 2019-11-12

## 2019-11-12 RX ORDER — ALBUTEROL SULFATE 2.5 MG/3ML
2.5 SOLUTION RESPIRATORY (INHALATION) ONCE
Status: COMPLETED | OUTPATIENT
Start: 2019-11-12 | End: 2019-11-12

## 2019-11-12 RX ORDER — ALBUTEROL SULFATE 90 UG/1
2 AEROSOL, METERED RESPIRATORY (INHALATION) ONCE
Status: COMPLETED | OUTPATIENT
Start: 2019-11-12 | End: 2019-11-12

## 2019-11-12 RX ORDER — IPRATROPIUM BROMIDE AND ALBUTEROL SULFATE 2.5; .5 MG/3ML; MG/3ML
SOLUTION RESPIRATORY (INHALATION)
Status: DISCONTINUED
Start: 2019-11-12 | End: 2019-11-12 | Stop reason: HOSPADM

## 2019-11-12 RX ORDER — IPRATROPIUM BROMIDE AND ALBUTEROL SULFATE 2.5; .5 MG/3ML; MG/3ML
3 SOLUTION RESPIRATORY (INHALATION) ONCE
Status: COMPLETED | OUTPATIENT
Start: 2019-11-12 | End: 2019-11-12

## 2019-11-12 RX ORDER — METHYLPREDNISOLONE SOD SUCC 125 MG
1 VIAL (EA) INJECTION ONCE
Status: COMPLETED | OUTPATIENT
Start: 2019-11-12 | End: 2019-11-12

## 2019-11-12 RX ADMIN — ALBUTEROL SULFATE 2.5 MG: 2.5 SOLUTION RESPIRATORY (INHALATION) at 04:01

## 2019-11-12 RX ADMIN — AZITHROMYCIN 500 MG: 250 TABLET, FILM COATED ORAL at 03:56

## 2019-11-12 RX ADMIN — ALBUTEROL SULFATE 2 PUFF: 90 AEROSOL, METERED RESPIRATORY (INHALATION) at 05:14

## 2019-11-12 RX ADMIN — ACETAMINOPHEN 650 MG: 325 TABLET ORAL at 03:56

## 2019-11-12 NOTE — ED NOTES
Dr Edward Starks back in to speak with patient regarding results of testing done and discharge instructions  Patient states that her headache is gone and that she is feeling much better       Wes Fernandes RN  11/12/19 [id number]

## 2019-11-12 NOTE — ED PROVIDER NOTES
History  Chief Complaint   Patient presents with    Shortness of Breath    Cough     63 y/o female BBA for c/o increasing dyspnea along with dry, nonproductive cough since 2100  Given SoluMedrol and albuterol by EMS en route; patient denies smoking history  No history of asthma and/or COPD  No CHF  Echo performed on 10/24 with EF of 61% and no acute abnormalities  Denies any chest pain  No nausea, vomiting, or diarrhea  Denies abdominal pain  Prior to Admission Medications   Prescriptions Last Dose Informant Patient Reported? Taking? Nutritional Supplements (ENSURE ORIGINAL) LIQD   No No   Sig: Take 1 Bottle by mouth daily   acetaminophen (TYLENOL) 650 mg CR tablet   No No   Sig: Take 1 tablet (650 mg total) by mouth every 8 (eight) hours as needed for mild pain   amLODIPine (NORVASC) 5 mg tablet   No No   Sig: Take 1 tablet (5 mg total) by mouth daily   hydrocortisone 1 % ointment   No No   Sig: Apply topically 2 (two) times a day   losartan (COZAAR) 100 MG tablet   No No   Sig: Take 1 tablet (100 mg total) by mouth daily   metoprolol succinate (TOPROL-XL) 100 mg 24 hr tablet   No No   Sig: Take 1 tablet (100 mg total) by mouth daily      Facility-Administered Medications: None       Past Medical History:   Diagnosis Date    Hypertension        Past Surgical History:   Procedure Laterality Date    BREAST BIOPSY Right     benign     SECTION      SPINE SURGERY         Family History   Problem Relation Age of Onset    Cancer Mother     Cancer Brother      I have reviewed and agree with the history as documented  Social History     Tobacco Use    Smoking status: Never Smoker    Smokeless tobacco: Never Used   Substance Use Topics    Alcohol use: Yes     Frequency: 2-4 times a month     Drinks per session: 1 or 2     Binge frequency: Never    Drug use: Never        Review of Systems   Respiratory: Positive for cough, chest tightness, shortness of breath and wheezing      All other systems reviewed and are negative  Physical Exam  Physical Exam   Constitutional: She appears well-developed and well-nourished  HENT:   Head: Normocephalic and atraumatic  Mouth/Throat: Oropharynx is clear and moist    Eyes: Pupils are equal, round, and reactive to light  EOM are normal    Neck: Normal range of motion  Neck supple  Cardiovascular: Normal rate and normal heart sounds  Pulmonary/Chest: No stridor  No respiratory distress  She has wheezes in the right upper field, the right middle field, the left upper field and the left middle field  Abdominal: Soft  Bowel sounds are normal    Musculoskeletal: Normal range of motion  Right lower leg: She exhibits no tenderness and no edema  Left lower leg: She exhibits no tenderness and no edema  Neurological: She is alert  Skin: Skin is warm and dry  Capillary refill takes less than 2 seconds  Psychiatric: She has a normal mood and affect  Her mood appears not anxious  Vitals reviewed        Vital Signs  ED Triage Vitals   Temperature Pulse Respirations Blood Pressure SpO2   11/12/19 0343 11/12/19 0343 11/12/19 0343 11/12/19 0343 11/12/19 0343   (!) 96 5 °F (35 8 °C) 89 (!) 24 155/86 94 %      Temp Source Heart Rate Source Patient Position - Orthostatic VS BP Location FiO2 (%)   11/12/19 0343 11/12/19 0343 11/12/19 0343 11/12/19 0343 --   Tympanic Monitor Sitting Left arm       Pain Score       11/12/19 0356       6           Vitals:    11/12/19 0343 11/12/19 0400   BP: 155/86 152/87   Pulse: 89 86   Patient Position - Orthostatic VS: Sitting Lying         Visual Acuity  Visual Acuity      Most Recent Value   L Pupil Size (mm)  4   R Pupil Size (mm)  4          ED Medications  Medications   albuterol inhalation solution 2 5 mg (2 5 mg Nebulization Given 11/12/19 0401)   azithromycin (ZITHROMAX) tablet 500 mg (500 mg Oral Given 11/12/19 0356)   acetaminophen (TYLENOL) tablet 650 mg (650 mg Oral Given 11/12/19 0356) ipratropium-albuterol (DUO-NEB) 0 5-2 5 mg/3 mL inhalation solution 3 mL (0 mL Nebulization Given to EMS 11/12/19 0432)   methylPREDNISolone sodium succinate (FOR EMS ONLY) (Solu-MEDROL) 125 MG injection 125 mg (0 mg Does not apply Given to EMS 11/12/19 0432)       Diagnostic Studies  Results Reviewed     Procedure Component Value Units Date/Time    Troponin I [[id number]]  (Normal) Collected:  11/12/19 0410    Lab Status:  Final result Specimen:  Blood from Arm, Left Updated:  11/12/19 0441     Troponin I <0 01 ng/mL     Comprehensive metabolic panel [[id number]]  (Abnormal) Collected:  11/12/19 0410    Lab Status:  Final result Specimen:  Blood from Arm, Left Updated:  11/12/19 0429     Sodium 138 mmol/L      Potassium 3 6 mmol/L      Chloride 100 mmol/L      CO2 29 mmol/L      ANION GAP 9 mmol/L      BUN 13 mg/dL      Creatinine 0 56 mg/dL      Glucose 150 mg/dL      Calcium 9 8 mg/dL      AST 27 U/L      ALT 21 U/L      Alkaline Phosphatase 80 U/L      Total Protein 9 0 g/dL      Albumin 5 0 g/dL      Total Bilirubin 0 80 mg/dL      eGFR 104 ml/min/1 73sq m     Narrative:       [place] guidelines for Chronic Kidney Disease (CKD):     Stage 1 with normal or high GFR (GFR > 90 mL/min/1 73 square meters)    Stage 2 Mild CKD (GFR = 60-89 mL/min/1 73 square meters)    Stage 3A Moderate CKD (GFR = 45-59 mL/min/1 73 square meters)    Stage 3B Moderate CKD (GFR = 30-44 mL/min/1 73 square meters)    Stage 4 Severe CKD (GFR = 15-29 mL/min/1 73 square meters)    Stage 5 End Stage CKD (GFR <15 mL/min/1 73 square meters)  Note: GFR calculation is accurate only with a steady state creatinine    CBC and differential [[id number]]  (Abnormal) Collected:  11/12/19 0410    Lab Status:  Final result Specimen:  Blood from Arm, Left Updated:  11/12/19 0421     WBC 12 70 Thousand/uL      RBC 5 09 Million/uL      Hemoglobin 14 3 g/dL      Hematocrit 42 8 %      MCV 84 fL      MCH 28 1 pg      MCHC 33 5 g/dL      RDW 13 7 %      MPV 7 8 fL      Platelets 742 Thousands/uL                  XR chest 1 view portable   ED Interpretation by Guille Prado DO (11/12 0407)   Hyperaeration with flattened diaphragm  Scattered interstitial markings - no PTX or signs of lobar infiltrate  Procedures  Procedures       ED Course  ED Course as of Nov 12 0451 Tue Nov 12, 2019   0352 EKG: nsr @ 78 bpm, no ST-T wave changes  2517 Cardiac markers, EKG, and cxr wnl; will reevaluate  0444 Slight expiratory wheeze upon reexamination  No signs of respiratory distress or hypoxia  0448 Denies any chest pain  MDM    Disposition  Final diagnoses:   Acute bronchitis     Time reflects when diagnosis was documented in both MDM as applicable and the Disposition within this note     Time User Action Codes Description Comment    11/12/2019  4:48 AM Ulysses Cool [J20 9] Acute bronchitis       ED Disposition     ED Disposition Condition Date/Time Comment    Discharge Stable Tue Nov 12, 2019  4:48 AM Carmen Jones discharge to home/self care              Follow-up Information     Follow up With Specialties Details Why Contact Info    Your PCP  Schedule an appointment as soon as possible for a visit in 1 week As needed           Patient's Medications   Discharge Prescriptions    AZITHROMYCIN (ZITHROMAX) 250 MG TABLET    Take 1 tablet (250 mg total) by mouth every 24 hours for 4 days       Start Date: 11/12/2019End Date: 11/16/2019       Order Dose: 250 mg       Quantity: 4 tablet    Refills: 0    PREDNISONE 20 MG TABLET    Take 1 tablet (20 mg total) by mouth 2 (two) times a day with meals for 5 days       Start Date: 11/12/2019End Date: 11/17/2019       Order Dose: 20 mg       Quantity: 10 tablet    Refills: 0    PROMETHAZINE-CODEINE (PHENERGAN WITH CODEINE) 6 25-10 MG/5 ML SYRUP    Take 5 mL by mouth every 4 (four) hours as needed for cough for up to 10 days       Start Date: 11/12/2019End Date: 11/22/2019       Order Dose: 5 mL       Quantity: 120 mL    Refills: 0     No discharge procedures on file      ED Provider  Electronically Signed by           Vy Rodriguez DO  11/12/19 0451

## 2019-11-25 ENCOUNTER — OFFICE VISIT (OUTPATIENT)
Dept: FAMILY MEDICINE CLINIC | Facility: CLINIC | Age: 59
End: 2019-11-25

## 2019-11-25 VITALS
DIASTOLIC BLOOD PRESSURE: 76 MMHG | WEIGHT: 100 LBS | TEMPERATURE: 97.4 F | OXYGEN SATURATION: 98 % | HEIGHT: 63 IN | HEART RATE: 76 BPM | RESPIRATION RATE: 18 BRPM | BODY MASS INDEX: 17.72 KG/M2 | SYSTOLIC BLOOD PRESSURE: 118 MMHG

## 2019-11-25 DIAGNOSIS — I10 ESSENTIAL HYPERTENSION: Primary | ICD-10-CM

## 2019-11-25 DIAGNOSIS — L85.3 DRY SKIN: ICD-10-CM

## 2019-11-25 PROCEDURE — 99213 OFFICE O/P EST LOW 20 MIN: CPT | Performed by: FAMILY MEDICINE

## 2019-11-25 PROCEDURE — 1036F TOBACCO NON-USER: CPT | Performed by: FAMILY MEDICINE

## 2019-11-25 PROCEDURE — 3008F BODY MASS INDEX DOCD: CPT | Performed by: FAMILY MEDICINE

## 2019-11-25 PROCEDURE — 3078F DIAST BP <80 MM HG: CPT | Performed by: FAMILY MEDICINE

## 2019-11-25 PROCEDURE — 3074F SYST BP LT 130 MM HG: CPT | Performed by: FAMILY MEDICINE

## 2019-11-25 RX ORDER — LOSARTAN POTASSIUM 100 MG/1
100 TABLET ORAL DAILY
Qty: 30 TABLET | Refills: 4 | Status: SHIPPED | OUTPATIENT
Start: 2019-11-25 | End: 2019-12-18 | Stop reason: SDUPTHER

## 2019-11-25 RX ORDER — METOPROLOL SUCCINATE 100 MG/1
100 TABLET, EXTENDED RELEASE ORAL DAILY
Qty: 30 TABLET | Refills: 4 | Status: SHIPPED | OUTPATIENT
Start: 2019-11-25 | End: 2019-12-18 | Stop reason: SDUPTHER

## 2019-11-25 RX ORDER — AMLODIPINE BESYLATE 5 MG/1
5 TABLET ORAL DAILY
Qty: 30 TABLET | Refills: 4 | Status: SHIPPED | OUTPATIENT
Start: 2019-11-25 | End: 2019-12-18 | Stop reason: SDUPTHER

## 2019-11-25 NOTE — ASSESSMENT & PLAN NOTE
Blood pressure currently well controlled with current antihypertensive therapy  No complaints of adverse effects, including visual changes, dizziness, headaches or syncope  Will continue current therapy with Metoprolol 10 mg daily, Amlodipine 5 mg daily and Losartan 100 mg daily  Encouraged diet and exercise regimen as tolerated

## 2019-11-25 NOTE — PROGRESS NOTES
Assessment/Plan:    Essential hypertension  Blood pressure currently well controlled with current antihypertensive therapy  No complaints of adverse effects, including visual changes, dizziness, headaches or syncope  Will continue current therapy with Metoprolol 10 mg daily, Amlodipine 5 mg daily and Losartan 100 mg daily  Encouraged diet and exercise regimen as tolerated         Diagnoses and all orders for this visit:    Essential hypertension  -     amLODIPine (NORVASC) 5 mg tablet; Take 1 tablet (5 mg total) by mouth daily  -     losartan (COZAAR) 100 MG tablet; Take 1 tablet (100 mg total) by mouth daily  -     metoprolol succinate (TOPROL-XL) 100 mg 24 hr tablet; Take 1 tablet (100 mg total) by mouth daily    Dry skin  -     mineral oil-hydrophilic petrolatum (AQUAPHOR) ointment; Apply topically as needed for dry skin          Subjective:      Patient ID: Ramez Kuo is a 64 y o  female  Patient presents to follow-up on hypertension asking for refill of her medications  She denies any new complains at present and states she is compliant with medications  The following portions of the patient's history were reviewed and updated as appropriate: allergies, current medications, past family history, past medical history, past social history, past surgical history and problem list     Review of Systems   Constitutional: Negative for chills, diaphoresis, fatigue and fever  HENT: Negative for congestion, ear discharge, ear pain, mouth sores, rhinorrhea, sore throat and trouble swallowing  Eyes: Negative for photophobia, pain and discharge  Respiratory: Negative for cough, chest tightness, shortness of breath and wheezing  Cardiovascular: Negative for chest pain, palpitations and leg swelling  Gastrointestinal: Negative for abdominal distention, abdominal pain, blood in stool, constipation, diarrhea and nausea  Genitourinary: Negative for dysuria and frequency     Musculoskeletal: Negative for joint swelling and neck stiffness  Skin: Negative for color change, pallor and rash  Neurological: Positive for weakness (chronic in the right foot)  Negative for dizziness, syncope and headaches  Numbness: no sensation in the right foot below the ankle  Objective:      /76 (BP Location: Left arm, Patient Position: Sitting, Cuff Size: Standard)   Pulse 76   Temp (!) 97 4 °F (36 3 °C) (Temporal)   Resp 18   Ht 5' 3" (1 6 m)   Wt 45 4 kg (100 lb)   SpO2 98%   Breastfeeding? No   BMI 17 71 kg/m²          Physical Exam   Constitutional: She is oriented to person, place, and time  She appears well-developed and well-nourished  No distress  HENT:   Head: Normocephalic and atraumatic  Eyes: Pupils are equal, round, and reactive to light  EOM are normal  No scleral icterus  Neck: Normal range of motion  Neck supple  Cardiovascular: Normal rate, regular rhythm and normal heart sounds  Exam reveals no gallop and no friction rub  No murmur heard  Pulmonary/Chest: Effort normal and breath sounds normal  No respiratory distress  She has no wheezes  She has no rales  She exhibits no tenderness  Abdominal: Soft  Bowel sounds are normal  She exhibits no distension  There is no tenderness  There is no rebound  Musculoskeletal: She exhibits no edema or tenderness  Left foot: Normal    Lymphadenopathy:     She has no cervical adenopathy  Neurological: She is alert and oriented to person, place, and time  Skin: Skin is warm and dry  No rash noted  No erythema  No pallor  Psychiatric: She has a normal mood and affect

## 2019-12-11 ENCOUNTER — HOSPITAL ENCOUNTER (OUTPATIENT)
Facility: HOSPITAL | Age: 59
Setting detail: OBSERVATION
Discharge: HOME | End: 2019-12-12
Attending: EMERGENCY MEDICINE | Admitting: FAMILY MEDICINE
Payer: COMMERCIAL

## 2019-12-11 ENCOUNTER — APPOINTMENT (EMERGENCY)
Dept: RADIOLOGY | Facility: HOSPITAL | Age: 59
End: 2019-12-11
Payer: COMMERCIAL

## 2019-12-11 ENCOUNTER — APPOINTMENT (EMERGENCY)
Dept: CT IMAGING | Facility: HOSPITAL | Age: 59
End: 2019-12-11
Payer: COMMERCIAL

## 2019-12-11 DIAGNOSIS — J21.0 RSV (ACUTE BRONCHIOLITIS DUE TO RESPIRATORY SYNCYTIAL VIRUS): ICD-10-CM

## 2019-12-11 DIAGNOSIS — R05.9 COUGH: ICD-10-CM

## 2019-12-11 DIAGNOSIS — J18.9 PNEUMONIA: Primary | ICD-10-CM

## 2019-12-11 DIAGNOSIS — E86.0 DEHYDRATION: ICD-10-CM

## 2019-12-11 PROBLEM — E27.8 ADRENAL HYPERPLASIA (HCC): Status: ACTIVE | Noted: 2019-12-11

## 2019-12-11 PROBLEM — E87.6 HYPOKALEMIA: Status: ACTIVE | Noted: 2019-12-11

## 2019-12-11 PROBLEM — J12.1 PNEUMONIA DUE TO RESPIRATORY SYNCYTIAL VIRUS (RSV): Status: ACTIVE | Noted: 2019-12-11

## 2019-12-11 LAB
ALBUMIN SERPL BCP-MCNC: 4.3 G/DL (ref 3–5.2)
ALP SERPL-CCNC: 65 U/L (ref 43–122)
ALT SERPL W P-5'-P-CCNC: 20 U/L (ref 9–52)
ANION GAP SERPL CALCULATED.3IONS-SCNC: 14 MMOL/L (ref 5–14)
AST SERPL W P-5'-P-CCNC: 27 U/L (ref 14–36)
BASOPHILS # BLD AUTO: 0.1 THOUSAND/UL (ref 0–0.1)
BASOPHILS NFR MAR MANUAL: 1 % (ref 0–1)
BILIRUB SERPL-MCNC: 1 MG/DL
BUN SERPL-MCNC: 14 MG/DL (ref 5–25)
CALCIUM SERPL-MCNC: 9.9 MG/DL (ref 8.4–10.2)
CHLORIDE SERPL-SCNC: 96 MMOL/L (ref 97–108)
CK MB SERPL-MCNC: 0.5 NG/ML (ref 0–2.4)
CK MB SERPL-MCNC: <1 % (ref 0–2.5)
CK SERPL-CCNC: 138 U/L (ref 30–135)
CO2 SERPL-SCNC: 30 MMOL/L (ref 22–30)
CREAT SERPL-MCNC: 0.65 MG/DL (ref 0.6–1.2)
EOSINOPHIL # BLD AUTO: 0.2 THOUSAND/UL (ref 0–0.4)
EOSINOPHIL NFR BLD MANUAL: 2 % (ref 0–6)
ERYTHROCYTE [DISTWIDTH] IN BLOOD BY AUTOMATED COUNT: 13.2 %
FLUAV RNA NPH QL NAA+PROBE: ABNORMAL
FLUBV RNA NPH QL NAA+PROBE: ABNORMAL
GFR SERPL CREATININE-BSD FRML MDRD: 114 ML/MIN/1.73SQ M
GLUCOSE SERPL-MCNC: 123 MG/DL (ref 70–99)
HCT VFR BLD AUTO: 40.3 % (ref 36–46)
HGB BLD-MCNC: 13.7 G/DL (ref 12–16)
LACTATE SERPL-SCNC: 1.1 MMOL/L (ref 0.7–2)
LYMPHOCYTES # BLD AUTO: 0.89 THOUSAND/UL (ref 0.5–4)
LYMPHOCYTES # BLD AUTO: 9 % (ref 25–45)
MCH RBC QN AUTO: 28.8 PG (ref 26–34)
MCHC RBC AUTO-ENTMCNC: 34 G/DL (ref 31–36)
MCV RBC AUTO: 85 FL (ref 80–100)
MONOCYTES # BLD AUTO: 1.68 THOUSAND/UL (ref 0.2–0.9)
MONOCYTES NFR BLD AUTO: 17 % (ref 1–10)
NEUTS BAND NFR BLD MANUAL: 3 % (ref 0–8)
NEUTS SEG # BLD: 7.03 THOUSAND/UL (ref 1.8–7.8)
NEUTS SEG NFR BLD AUTO: 68 %
NT-PROBNP SERPL-MCNC: 315 PG/ML (ref 0–299)
PLATELET # BLD AUTO: 440 THOUSANDS/UL (ref 150–450)
PLATELET BLD QL SMEAR: ADEQUATE
PMV BLD AUTO: 8.4 FL (ref 8.9–12.7)
POTASSIUM SERPL-SCNC: 3.2 MMOL/L (ref 3.6–5)
PROT SERPL-MCNC: 8.4 G/DL (ref 5.9–8.4)
RBC # BLD AUTO: 4.75 MILLION/UL (ref 4–5.2)
RBC MORPH BLD: NORMAL
RSV RNA NPH QL NAA+PROBE: DETECTED
SODIUM SERPL-SCNC: 140 MMOL/L (ref 137–147)
TOTAL CELLS COUNTED SPEC: 100
TROPONIN I SERPL-MCNC: <0.01 NG/ML (ref 0–0.03)
WBC # BLD AUTO: 9.9 THOUSAND/UL (ref 4.5–11)

## 2019-12-11 PROCEDURE — 85027 COMPLETE CBC AUTOMATED: CPT | Performed by: EMERGENCY MEDICINE

## 2019-12-11 PROCEDURE — 96375 TX/PRO/DX INJ NEW DRUG ADDON: CPT

## 2019-12-11 PROCEDURE — 84484 ASSAY OF TROPONIN QUANT: CPT | Performed by: EMERGENCY MEDICINE

## 2019-12-11 PROCEDURE — 96361 HYDRATE IV INFUSION ADD-ON: CPT

## 2019-12-11 PROCEDURE — 82553 CREATINE MB FRACTION: CPT | Performed by: EMERGENCY MEDICINE

## 2019-12-11 PROCEDURE — 96374 THER/PROPH/DIAG INJ IV PUSH: CPT

## 2019-12-11 PROCEDURE — 85007 BL SMEAR W/DIFF WBC COUNT: CPT | Performed by: EMERGENCY MEDICINE

## 2019-12-11 PROCEDURE — 71260 CT THORAX DX C+: CPT

## 2019-12-11 PROCEDURE — 74177 CT ABD & PELVIS W/CONTRAST: CPT

## 2019-12-11 PROCEDURE — 99285 EMERGENCY DEPT VISIT HI MDM: CPT

## 2019-12-11 PROCEDURE — 87631 RESP VIRUS 3-5 TARGETS: CPT | Performed by: EMERGENCY MEDICINE

## 2019-12-11 PROCEDURE — 99284 EMERGENCY DEPT VISIT MOD MDM: CPT | Performed by: EMERGENCY MEDICINE

## 2019-12-11 PROCEDURE — 93005 ELECTROCARDIOGRAM TRACING: CPT

## 2019-12-11 PROCEDURE — 80053 COMPREHEN METABOLIC PANEL: CPT | Performed by: EMERGENCY MEDICINE

## 2019-12-11 PROCEDURE — 83880 ASSAY OF NATRIURETIC PEPTIDE: CPT | Performed by: EMERGENCY MEDICINE

## 2019-12-11 PROCEDURE — 71045 X-RAY EXAM CHEST 1 VIEW: CPT

## 2019-12-11 PROCEDURE — 82550 ASSAY OF CK (CPK): CPT | Performed by: EMERGENCY MEDICINE

## 2019-12-11 PROCEDURE — 83605 ASSAY OF LACTIC ACID: CPT | Performed by: EMERGENCY MEDICINE

## 2019-12-11 PROCEDURE — 36415 COLL VENOUS BLD VENIPUNCTURE: CPT | Performed by: EMERGENCY MEDICINE

## 2019-12-11 RX ORDER — KETOROLAC TROMETHAMINE 30 MG/ML
15 INJECTION, SOLUTION INTRAMUSCULAR; INTRAVENOUS ONCE
Status: COMPLETED | OUTPATIENT
Start: 2019-12-11 | End: 2019-12-11

## 2019-12-11 RX ORDER — DIAPER,BRIEF,INFANT-TODD,DISP
EACH MISCELLANEOUS 2 TIMES DAILY
Status: DISCONTINUED | OUTPATIENT
Start: 2019-12-11 | End: 2019-12-12 | Stop reason: HOSPADM

## 2019-12-11 RX ORDER — PETROLATUM 42 G/100G
OINTMENT TOPICAL AS NEEDED
Status: DISCONTINUED | OUTPATIENT
Start: 2019-12-11 | End: 2019-12-11 | Stop reason: SDUPTHER

## 2019-12-11 RX ORDER — LEVOFLOXACIN 5 MG/ML
750 INJECTION, SOLUTION INTRAVENOUS ONCE
Status: DISCONTINUED | OUTPATIENT
Start: 2019-12-11 | End: 2019-12-11

## 2019-12-11 RX ORDER — GUAIFENESIN/DEXTROMETHORPHAN 100-10MG/5
10 SYRUP ORAL EVERY 4 HOURS PRN
Status: DISCONTINUED | OUTPATIENT
Start: 2019-12-11 | End: 2019-12-12 | Stop reason: HOSPADM

## 2019-12-11 RX ORDER — IBUPROFEN 400 MG/1
800 TABLET ORAL EVERY 8 HOURS PRN
Status: DISCONTINUED | OUTPATIENT
Start: 2019-12-11 | End: 2019-12-12 | Stop reason: HOSPADM

## 2019-12-11 RX ORDER — BENZONATATE 100 MG/1
100 CAPSULE ORAL 3 TIMES DAILY PRN
Status: DISCONTINUED | OUTPATIENT
Start: 2019-12-11 | End: 2019-12-12 | Stop reason: HOSPADM

## 2019-12-11 RX ORDER — SODIUM CHLORIDE 9 MG/ML
80 INJECTION, SOLUTION INTRAVENOUS CONTINUOUS
Status: DISCONTINUED | OUTPATIENT
Start: 2019-12-11 | End: 2019-12-12

## 2019-12-11 RX ORDER — LANOLIN ALCOHOL/MO/W.PET/CERES
CREAM (GRAM) TOPICAL AS NEEDED
Status: DISCONTINUED | OUTPATIENT
Start: 2019-12-11 | End: 2019-12-12 | Stop reason: HOSPADM

## 2019-12-11 RX ORDER — LOSARTAN POTASSIUM 50 MG/1
100 TABLET ORAL DAILY
Status: DISCONTINUED | OUTPATIENT
Start: 2019-12-12 | End: 2019-12-12 | Stop reason: HOSPADM

## 2019-12-11 RX ORDER — POTASSIUM CHLORIDE 20 MEQ/1
40 TABLET, EXTENDED RELEASE ORAL ONCE
Status: COMPLETED | OUTPATIENT
Start: 2019-12-11 | End: 2019-12-11

## 2019-12-11 RX ORDER — AMLODIPINE BESYLATE 5 MG/1
5 TABLET ORAL DAILY
Status: DISCONTINUED | OUTPATIENT
Start: 2019-12-12 | End: 2019-12-12 | Stop reason: HOSPADM

## 2019-12-11 RX ORDER — SODIUM CHLORIDE 9 MG/ML
125 INJECTION, SOLUTION INTRAVENOUS CONTINUOUS
Status: DISCONTINUED | OUTPATIENT
Start: 2019-12-11 | End: 2019-12-11

## 2019-12-11 RX ORDER — METOPROLOL SUCCINATE 50 MG/1
100 TABLET, EXTENDED RELEASE ORAL DAILY
Status: DISCONTINUED | OUTPATIENT
Start: 2019-12-12 | End: 2019-12-12 | Stop reason: HOSPADM

## 2019-12-11 RX ORDER — GUAIFENESIN/DEXTROMETHORPHAN 100-10MG/5
10 SYRUP ORAL EVERY 4 HOURS PRN
Status: DISCONTINUED | OUTPATIENT
Start: 2019-12-11 | End: 2019-12-11

## 2019-12-11 RX ADMIN — MORPHINE SULFATE 2 MG: 2 INJECTION, SOLUTION INTRAMUSCULAR; INTRAVENOUS at 13:11

## 2019-12-11 RX ADMIN — SODIUM CHLORIDE 1000 ML: 0.9 INJECTION, SOLUTION INTRAVENOUS at 13:16

## 2019-12-11 RX ADMIN — SODIUM CHLORIDE 80 ML/HR: 0.9 INJECTION, SOLUTION INTRAVENOUS at 17:39

## 2019-12-11 RX ADMIN — KETOROLAC TROMETHAMINE 15 MG: 30 INJECTION, SOLUTION INTRAMUSCULAR; INTRAVENOUS at 14:20

## 2019-12-11 RX ADMIN — IOHEXOL 85 ML: 350 INJECTION, SOLUTION INTRAVENOUS at 13:30

## 2019-12-11 RX ADMIN — HYDROCORTISONE: 1 OINTMENT TOPICAL at 19:23

## 2019-12-11 RX ADMIN — BENZONATATE 100 MG: 100 CAPSULE ORAL at 17:40

## 2019-12-11 RX ADMIN — POTASSIUM CHLORIDE 40 MEQ: 20 TABLET, EXTENDED RELEASE ORAL at 17:40

## 2019-12-11 RX ADMIN — IBUPROFEN 800 MG: 400 TABLET ORAL at 21:32

## 2019-12-11 RX ADMIN — GUAIFENESIN AND DEXTROMETHORPHAN 10 ML: 100; 10 SYRUP ORAL at 21:15

## 2019-12-11 NOTE — ED NOTES
Patient transported to floor via wheelchair with kota Wan        [facility]  12/11/19 1323

## 2019-12-11 NOTE — ASSESSMENT & PLAN NOTE
Currently 89 lbs  Complains of 5 lbs weight loss in the past 2 weeks  Takes ensure supplement at home      -Will start on home diet

## 2019-12-11 NOTE — H&P
History and Physical - Zenon Ball    Patient Information: Treva Antunez 64 y o  female MRN: [id number]  Unit/Bed#: 7T [facility] 705-02 Encounter: [id number]  Admitting Physician: Macarena Woods MD   PCP: Karyn Lo MD  Date of Admission:  12/11/19    Assessment and Plan    * Pneumonia due to respiratory syncytial virus (RSV)   Assessment & Plan  Pneumonia likely due to RSV as evident by RSV PCR positive  Has had symptoms since Sunday that included cough and malaise and weakness  She is afebrile at 97 8 and has no leukocytosis, WBC is 9 9  Pulse ox 95  CT chest Imagining shows: 1  Tree-in-bud micronodular infiltrates throughout both lungs, likely representing infectious or inflammatory bronchiolitis  2  Small focus of consolidation medial left lower lobe may represent atelectasis or pneumonia  - admit for observation overnight  - Treat conservatively since due to viral   - give IV fluids at 80 cc per hour since not dehydrated  - treat her cough with Robitussin and Benzonatate (tessalom pearls)  - Maintain O2 stat above 94%  Essential hypertension  Assessment & Plan  Blood pressure currently at 125/91 and has been elevated once to 141/88 with a goal of less that 140/90  She will be restarted on her home medication which include:     - amlodipine 5 mg once daily   - losartan 100 mg once daily   -metoprolol 100 mg once daily     Adrenal hyperplasia [facility])  Assessment & Plan  An incidental finding on CT  She is asymptomatic  Will follow up with PCP or endocrinologist outpatient  Hypokalemia  Overview       Assessment & Plan  Potassium currenlty at 3 2  Most likely due to poor P O  Intake for a day  - will give a one time dose of potassium chloride  40 meq and will reassess her tomorrow with a BMP  Eczema  Assessment & Plan  Chronic, noticed on the right ankle  - Home steroid creams will be continued     Underweight  Assessment & Plan  Currently 89 lbs  Complains of 5 lbs weight loss in the past 2 weeks  Takes ensure supplement at home      -Will start on home diet  Urinary incontinence without sensory awareness  Assessment & Plan  Chronic, Still complains of this urinary incontinence, however she denies dysuria  Will monitor I/O's and bladder scan if necessary     Atrophy of muscle of right lower leg  Assessment & Plan  No complaint currently so will follow up out patinet as needed       VTE Prophylaxis: Enoxaparin (Lovenox)  Code Status: Level 1 - Full Code  Anticipated Length of Stay:  Patient will be admitted on an Observation basis with an anticipated length of stay of  less than 2 midnights  Justification for Hospital Stay: IV hydration and monitoring due to no improvement of symptoms  Total Time for Visit, including Counseling / Coordination of Care: 40 mins  Greater than 50% of this total time spent on direct patient counseling and coordination of care  Chief Complaint:     Chief Complaint   Patient presents with    Cold Like Symptoms     states has been in bed past 6 days with cough and head congestion, pain in stomach, back and throat from coughing   has the same; also having fevers; states not taking blood pressures medications because stomach hurts; also feeling dizzy and nauseous  History of Present Illness:    Sandip Malave is a 64 y o  female who presents with a productive cough and epigastric pain that started since Sunday  She brings up yellow and green phlegm  The epigastric pain was attributed to her amount of coughing  She also complained of a headache as well  She complains of chronic back pain that feels different from her pain she had before her spinal decompression  She attributes this to the cough as well  She has tried advil liquid gel that helps for a little bit but hasn't resolved her pain     She still complains of urinary frequency that has been present before her cough however she denies dysuria  She also feels week from not eating since yesterday evening and has not been able to ambulate like she usually does  She doesn't complain of any nausea or vomiting but has lost 5 pounds in the last 2 weeks  She denied chest pain, shortness of breath, abdominal pain  In the ED she received morphine 2mg and 1 liter bolus of normal saline  EKG showed normal sinus rhythm with no ST segment abnormality  She will be admitted on our service for observation  Review of Systems:  Review of Systems   Constitutional: Positive for activity change  Negative for chills and fever  Respiratory: Positive for cough  Negative for shortness of breath and wheezing  Cardiovascular: Negative for palpitations and leg swelling  Gastrointestinal: Positive for abdominal pain  Negative for abdominal distention, nausea and vomiting  Endocrine: Positive for polyuria  Negative for polydipsia  Genitourinary: Positive for frequency and urgency  Negative for flank pain and hematuria  Musculoskeletal: Positive for back pain  Skin: Positive for rash (On right leg )  Neurological: Positive for weakness and headaches  Negative for syncope and light-headedness  Past Medical and Surgical History:   Past Medical History:   Diagnosis Date    Asthma     History of transfusion     Hypertension      Past Surgical History:   Procedure Laterality Date    BREAST BIOPSY Right     benign     SECTION      SPINE SURGERY       Meds/Allergies: Allergies: Allergies   Allergen Reactions    Aspirin Swelling    Penicillins Swelling     Prior to Admission Medications   Prescriptions Last Dose Informant Patient Reported? Taking?    Nutritional Supplements (ENSURE ORIGINAL) LIQD 12/10/2019 at Unknown time  No Yes   Sig: Take 1 Bottle by mouth daily   acetaminophen (TYLENOL) 650 mg CR tablet 12/10/2019 at Unknown time  No Yes   Sig: Take 1 tablet (650 mg total) by mouth every 8 (eight) hours as needed for mild pain   amLODIPine (NORVASC) 5 mg tablet 12/10/2019 at Unknown time  No Yes   Sig: Take 1 tablet (5 mg total) by mouth daily   hydrocortisone 1 % ointment 12/10/2019 at Unknown time  No Yes   Sig: Apply topically 2 (two) times a day   losartan (COZAAR) 100 MG tablet 12/10/2019 at Unknown time  No Yes   Sig: Take 1 tablet (100 mg total) by mouth daily   metoprolol succinate (TOPROL-XL) 100 mg 24 hr tablet 12/10/2019 at Unknown time  No Yes   Sig: Take 1 tablet (100 mg total) by mouth daily   mineral oil-hydrophilic petrolatum (AQUAPHOR) ointment 12/10/2019 at Unknown time  No Yes   Sig: Apply topically as needed for dry skin      Facility-Administered Medications: None     Social History:     Social History     Socioeconomic History    Marital status: Single     Spouse name: Not on file    Number of children: Not on file    Years of education: Not on file    Highest education level: Not on file   Occupational History    Not on file   Social Needs    Financial resource strain: Not on file    Food insecurity:     Worry: Not on file     Inability: Not on file    Transportation needs:     Medical: Not on file     Non-medical: Not on file   Tobacco Use    Smoking status: Never Smoker    Smokeless tobacco: Never Used   Substance and Sexual Activity    Alcohol use: Yes     Frequency: 2-4 times a month     Drinks per session: 1 or 2     Binge frequency: Never    Drug use: Never    Sexual activity: Yes     Partners: Male   Lifestyle    Physical activity:     Days per week: Not on file     Minutes per session: Not on file    Stress: Not on file   Relationships    Social connections:     Talks on phone: Not on file     Gets together: Not on file     Attends [employer] service: Not on file     Active member of club or organization: Not on file     Attends meetings of clubs or organizations: Not on file     Relationship status: Not on file    Intimate partner violence:     Fear of current or ex partner: Not on file Emotionally abused: Not on file     Physically abused: Not on file     Forced sexual activity: Not on file   Other Topics Concern    Not on file   Social History Narrative    Not on file     Patient Pre-hospital Living Situation: with    Patient Pre-hospital Level of Mobility: No problems   Patient Pre-hospital Diet Restrictions: No dietary restrictions     Family History:  Family History   Problem Relation Age of Onset    Cancer Mother     Cancer Brother      Physical Exam:   Vitals:   Blood Pressure: 125/91 (12/11/19 1620)  Pulse: 88 (12/11/19 1620)  Temperature: 97 8 °F (36 6 °C) (12/11/19 1620)  Temp Source: Temporal (12/11/19 1620)  Respirations: 18 (12/11/19 1620)  Height: 5' 3" (160 cm) (12/11/19 1620)  Weight - Scale: 40 8 kg (89 lb 15 2 oz) (12/11/19 1620)  SpO2: 93 % (12/11/19 1620)    Physical Exam   Constitutional: She is oriented to person, place, and time  She appears cachectic  She has a sickly appearance  Cardiovascular: Normal rate, regular rhythm and normal heart sounds  Pulmonary/Chest: No respiratory distress  She has wheezes (diffusely in both lung fields )  She exhibits no tenderness  Abdominal: Soft  She exhibits no distension  There is no tenderness  Neurological: She is alert and oriented to person, place, and time  Skin: Skin is warm and dry  Lab Results: I have personally reviewed pertinent reports      Results from last 7 days   Lab Units 12/11/19  1307   WBC Thousand/uL 9 90   HEMOGLOBIN g/dL 13 7   HEMATOCRIT % 40 3   PLATELETS Thousands/uL 440   LYMPHO PCT % 9*   MONO PCT % 17*   EOS PCT % 2   BANDS PCT % 3     Results from last 7 days   Lab Units 12/11/19  1307   POTASSIUM mmol/L 3 2*   CHLORIDE mmol/L 96*   CO2 mmol/L 30   BUN mg/dL 14   CREATININE mg/dL 0 65   CALCIUM mg/dL 9 9   ALK PHOS U/L 65   ALT U/L 20   AST U/L 27   EGFR ml/min/1 73sq m 114         Results from last 7 days   Lab Units 12/11/19  1307   CK TOTAL U/L 138*   TROPONIN I ng/mL <0 01 CK MB INDEX % <1 0     Results from last 7 days   Lab Units 12/11/19  1312   LACTIC ACID mmol/L 1 1         Results from last 7 days   Lab Units 12/11/19  1307   NT-PRO BNP pg/mL 315*            Invalid input(s): URIBILINOGEN          Imaging: I have personally reviewed pertinent reports  Xr Chest 1 View Portable    Result Date: 12/11/2019  Narrative: CHEST INDICATION:  Cough  COMPARISON:  11/12/2019 EXAM PERFORMED/VIEWS:  XR CHEST PORTABLE FINDINGS: Cardiomediastinal silhouette appears unremarkable  No lobar infiltrates  Calcified granuloma left lower lung zone  Lungs are mildly hyperinflated  Subtle interstitial basilar prominence  No pneumothorax or pleural effusion  Osseous structures appear within normal limits for patient age  Impression: No lobar infiltrates  Subtle interstitial basilar prominence  Workstation performed: [id number]     Xr Chest 1 View Portable    Result Date: 11/12/2019  Narrative: CHEST INDICATION:   dyspnea  COMPARISON:  None EXAM PERFORMED/VIEWS:  XR CHEST PORTABLE FINDINGS: Cardiomediastinal silhouette appears unremarkable  Nodular density overlying the left lower lung  No focal consolidation, pneumothorax or pleural effusion  Osseous structures appear within normal limits for patient age  Impression: 1  No acute cardiopulmonary disease  2  Left lower lobe nodular density may represent a nipple shadow versus true nodule or summation  Recommend repeat frontal view with nipple markers for confirmation  The study was marked in EPIC for significant notification  Workstation performed: [id number]     Ct Chest Abdomen Pelvis W Contrast    Result Date: 12/11/2019  Narrative: CT CHEST, ABDOMEN AND PELVIS WITH IV CONTRAST INDICATION:  Sepsis  COMPARISON:  Chest film 11/12/2019 TECHNIQUE: CT examination of the chest, abdomen and pelvis was performed  Axial, sagittal, and coronal 2D reformatted images were created from the source data and submitted for interpretation   Radiation dose length product (DLP) for this visit:  210 mGy-cm   This examination, like all CT scans performed in the [facility], was performed utilizing techniques to minimize radiation dose exposure, including the use of iterative reconstruction and automated exposure control  IV Contrast:  85 mL of iohexol (OMNIPAQUE) Enteric Contrast: Enteric contrast was administered  FINDINGS: CHEST LUNGS:  Mild bibasilar bronchial wall thickening with tree-in-bud micronodular infiltrates seen throughout both lungs, likely representing infectious or inflammatory bronchiolitis  Small focus of consolidation in the medial left lower lobe may represent atelectasis or pneumonia  Calcified left lower lobe granuloma  No endobronchial lesions detected  PLEURA:  Unremarkable  HEART/GREAT VESSELS:  Unremarkable for patient's age  MEDIASTINUM AND NIDA:  Subcentimeter mediastinal and hilar lymph nodes, some of which appear calcified consistent with previous granulomatous disease  The esophagus is unremarkable  CHEST WALL AND LOWER NECK:   There is more dense tissue seen in the posterior right breast compared to the remainder of the breast parenchyma  This is best seen on series 2 images 25-30  ABDOMEN LIVER/BILIARY TREE:  Unremarkable  GALLBLADDER:  No calcified gallstones  No pericholecystic inflammatory change  SPLEEN:  Unremarkable  PANCREAS:  Unremarkable  ADRENAL GLANDS:  Mild thickening of the left adrenal gland  The right adrenal gland is normal  KIDNEYS/URETERS:  Unremarkable  No hydronephrosis  STOMACH AND BOWEL:  The stomach is poorly distended, evaluation limited  Small bowel is normal caliber  No focal inflammatory changes or fluid collections are identified  APPENDIX:  No findings to suggest appendicitis  ABDOMINOPELVIC CAVITY:  No ascites or free intraperitoneal air  No lymphadenopathy  VESSELS:  Unremarkable for patient's age  PELVIS REPRODUCTIVE ORGANS:  The uterus is retroverted   2 cm iso to hyperdense nodule seen in the fundal region, probably a fibroid, cannot exclude submucosal component  Small hypodensity along the left uterine body  URINARY BLADDER:  Unremarkable  ABDOMINAL WALL/INGUINAL REGIONS:  Unremarkable  OSSEOUS STRUCTURES:  No acute fracture or destructive osseous lesion  Degenerative disc disease L5-S1 with posterior disc osteophyte complex causing moderate central canal narrowing  Impression: 1  Tree-in-bud micronodular infiltrates throughout both lungs, likely representing infectious or inflammatory bronchiolitis  2  Small focus of consolidation medial left lower lobe may represent atelectasis or pneumonia  3  No acute intra-abdominal or pelvic abnormality  4  Mild thickening left adrenal gland  Although its imaging features are indeterminate, it does not have suspicious imaging features (heterogeneity, necrosis, irregular margins),  but based on its size, a dedicated adrenal protocol CT is recommended at this time to confirm benignity  5  Asymmetric right breast tissue  This has been evaluated on recent right breast ultrasound from October of this year  Please see this report  6  Suspected fibroid uterus as above  Findings can be corroborated with nonemergent pelvic ultrasound if relevant   Workstation performed: [id number]       EKG, Pathology, and Other Studies Reviewed on Admission:   EKG  Result Date: 12/11/19  Impression:  Normal sinus rhythm, Baseline artifact, Normal ECG, No previous ECGs available    [employer] Records Reviewed: Yes    Entire H&P was discussed with Dr Danni Trimble who agreed to what is noted above    Moy Hurtado  12/11/19  6:56 PM

## 2019-12-11 NOTE — ED NOTES
[facility] returned from [facility]        [facility]  12/11/19 3719

## 2019-12-11 NOTE — ED PROVIDER NOTES
History  Chief Complaint   Patient presents with    Cold Like Symptoms     states has been in bed past 6 days with cough and head congestion, pain in stomach, back and throat from coughing   has the same; also having fevers; states not taking blood pressures medications because stomach hurts; also feeling dizzy and nauseous  History provided by:  Patient  Cough   Cough characteristics:  Non-productive  Sputum characteristics:  Nondescript  Severity:  Moderate  Onset quality:  Gradual  Timing:  Constant  Progression:  Worsening  Chronicity:  New  Relieved by:  Nothing  Worsened by:  Nothing  Ineffective treatments:  None tried  Associated symptoms: no chest pain, no chills, no fever, no headaches, no myalgias, no rash, no rhinorrhea, no shortness of breath, no sore throat and no wheezing        Prior to Admission Medications   Prescriptions Last Dose Informant Patient Reported? Taking?    Nutritional Supplements (ENSURE ORIGINAL) LIQD   No No   Sig: Take 1 Bottle by mouth daily   acetaminophen (TYLENOL) 650 mg CR tablet   No No   Sig: Take 1 tablet (650 mg total) by mouth every 8 (eight) hours as needed for mild pain   amLODIPine (NORVASC) 5 mg tablet   No No   Sig: Take 1 tablet (5 mg total) by mouth daily   hydrocortisone 1 % ointment   No No   Sig: Apply topically 2 (two) times a day   losartan (COZAAR) 100 MG tablet   No No   Sig: Take 1 tablet (100 mg total) by mouth daily   metoprolol succinate (TOPROL-XL) 100 mg 24 hr tablet   No No   Sig: Take 1 tablet (100 mg total) by mouth daily   mineral oil-hydrophilic petrolatum (AQUAPHOR) ointment   No No   Sig: Apply topically as needed for dry skin      Facility-Administered Medications: None       Past Medical History:   Diagnosis Date    Hypertension        Past Surgical History:   Procedure Laterality Date    BREAST BIOPSY Right     benign     SECTION      SPINE SURGERY         Family History   Problem Relation Age of Onset    Cancer Mother     Cancer Brother      I have reviewed and agree with the history as documented  Social History     Tobacco Use    Smoking status: Never Smoker    Smokeless tobacco: Never Used   Substance Use Topics    Alcohol use: Yes     Frequency: 2-4 times a month     Drinks per session: 1 or 2     Binge frequency: Never    Drug use: Never        Review of Systems   Constitutional: Negative for chills and fever  HENT: Negative for rhinorrhea, sore throat and trouble swallowing  Eyes: Negative for pain  Respiratory: Positive for cough  Negative for shortness of breath, wheezing and stridor  Cardiovascular: Negative for chest pain and leg swelling  Gastrointestinal: Negative for abdominal pain, diarrhea and nausea  Endocrine: Negative for polyuria  Genitourinary: Negative for dysuria, flank pain and urgency  Musculoskeletal: Negative for joint swelling, myalgias and neck stiffness  Skin: Negative for rash  Allergic/Immunologic: Negative for immunocompromised state  Neurological: Negative for dizziness, syncope, weakness, numbness and headaches  Psychiatric/Behavioral: Negative for confusion and suicidal ideas  All other systems reviewed and are negative  Physical Exam  Physical Exam   Constitutional: She is oriented to person, place, and time  She has a sickly appearance  No distress  HENT:   Head: Normocephalic and atraumatic  Eyes: Pupils are equal, round, and reactive to light  EOM are normal    Neck: Normal range of motion  Neck supple  Cardiovascular: Normal rate and regular rhythm  Exam reveals no friction rub  No murmur heard  Pulmonary/Chest: No respiratory distress  She has wheezes  She has no rales  Course breath sounds bilaterally   Abdominal: Soft  Bowel sounds are normal  She exhibits no distension  There is no tenderness  Musculoskeletal: Normal range of motion  She exhibits no edema or tenderness     Neurological: She is alert and oriented to person, place, and time  Skin: Skin is warm  No rash noted  Psychiatric: She has a normal mood and affect  Nursing note and vitals reviewed        Vital Signs  ED Triage Vitals [12/11/19 1239]   Temperature Pulse Respirations Blood Pressure SpO2   97 8 °F (36 6 °C) (!) 114 18 121/84 91 %      Temp Source Heart Rate Source Patient Position - Orthostatic VS BP Location FiO2 (%)   Tympanic Monitor Standing Left arm --      Pain Score       8           Vitals:    12/11/19 1239   BP: 121/84   Pulse: (!) 114   Patient Position - Orthostatic VS: Standing         Visual Acuity      ED Medications  Medications   sodium chloride 0 9 % bolus 1,000 mL (1,000 mL Intravenous New Bag 12/11/19 1316)   morphine injection 2 mg (2 mg Intravenous Given 12/11/19 1311)   iohexol (OMNIPAQUE) 350 MG/ML injection (MULTI-DOSE) 85 mL (85 mL Intravenous Given 12/11/19 1330)   ketorolac (TORADOL) injection 15 mg (15 mg Intravenous Given 12/11/19 1420)       Diagnostic Studies  Results Reviewed     Procedure Component Value Units Date/Time    Influenza A/B and RSV PCR [[id number]]  (Abnormal) Collected:  12/11/19 1309    Lab Status:  Final result Specimen:  Nose Updated:  12/11/19 1357     INFLUENZA A PCR None Detected     INFLUENZA B PCR None Detected     RSV PCR Detected    CKMB [[id number]]  (Normal) Collected:  12/11/19 1307    Lab Status:  Final result Specimen:  Blood from Arm, Left Updated:  12/11/19 1354     CK-MB Index <1 0 %      CK-MB 0 5 ng/mL     Troponin I [[id number]]  (Normal) Collected:  12/11/19 1307    Lab Status:  Final result Specimen:  Blood from Arm, Left Updated:  12/11/19 1348     Troponin I <0 01 ng/mL     CK Total with Reflex CKMB [[id number]]  (Abnormal) Collected:  12/11/19 1307    Lab Status:  Final result Specimen:  Blood from Arm, Left Updated:  12/11/19 1339     Total  U/L     Comprehensive metabolic panel [[id number]]  (Abnormal) Collected:  12/11/19 1307    Lab Status:  Final result Specimen:  Blood from Arm, Left Updated:  12/11/19 1339     Sodium 140 mmol/L      Potassium 3 2 mmol/L      Chloride 96 mmol/L      CO2 30 mmol/L      ANION GAP 14 mmol/L      BUN 14 mg/dL      Creatinine 0 65 mg/dL      Glucose 123 mg/dL      Calcium 9 9 mg/dL      AST 27 U/L      ALT 20 U/L      Alkaline Phosphatase 65 U/L      Total Protein 8 4 g/dL      Albumin 4 3 g/dL      Total Bilirubin 1 00 mg/dL      eGFR 114 ml/min/1 73sq m     Narrative:       [place] guidelines for Chronic Kidney Disease (CKD):     Stage 1 with normal or high GFR (GFR > 90 mL/min/1 73 square meters)    Stage 2 Mild CKD (GFR = 60-89 mL/min/1 73 square meters)    Stage 3A Moderate CKD (GFR = 45-59 mL/min/1 73 square meters)    Stage 3B Moderate CKD (GFR = 30-44 mL/min/1 73 square meters)    Stage 4 Severe CKD (GFR = 15-29 mL/min/1 73 square meters)    Stage 5 End Stage CKD (GFR <15 mL/min/1 73 square meters)  Note: GFR calculation is accurate only with a steady state creatinine    Lactic acid, plasma x2 [[id number]]  (Normal) Collected:  12/11/19 1312    Lab Status:  Final result Specimen:  Blood from Arm, Left Updated:  12/11/19 1333     LACTIC ACID 1 1 mmol/L     Narrative:       Result may be elevated if tourniquet was used during collection  CBC and differential [[id number]]  (Abnormal) Collected:  12/11/19 1307    Lab Status:  Final result Specimen:  Blood from Arm, Left Updated:  12/11/19 1326     WBC 9 90 Thousand/uL      RBC 4 75 Million/uL      Hemoglobin 13 7 g/dL      Hematocrit 40 3 %      MCV 85 fL      MCH 28 8 pg      MCHC 34 0 g/dL      RDW 13 2 %      MPV 8 4 fL      Platelets 566 Thousands/uL     NT-BNP PRO [[id number]] Collected:  12/11/19 1307    Lab Status:  No result Specimen:  Blood from Arm, Left                  CT chest abdomen pelvis w contrast   Final Result by Lizette Torres DO (12/11 1434)      1   Tree-in-bud micronodular infiltrates throughout both lungs, likely representing infectious or inflammatory bronchiolitis  2  Small focus of consolidation medial left lower lobe may represent atelectasis or pneumonia  3  No acute intra-abdominal or pelvic abnormality  4  Mild thickening left adrenal gland  Although its imaging features are indeterminate, it does not have suspicious imaging features (heterogeneity, necrosis, irregular margins),  but based on its size, a dedicated adrenal protocol CT is recommended at    this time to confirm benignity  5  Asymmetric right breast tissue  This has been evaluated on recent right breast ultrasound from October of this year  Please see this report  6  Suspected fibroid uterus as above  Findings can be corroborated with nonemergent pelvic ultrasound if relevant  Workstation performed: [id number]         XR chest 1 view portable   Final Result by Redd Swanson DO (12/11 1413)      No lobar infiltrates  Subtle interstitial basilar prominence  Workstation performed: [id number]                    Procedures  ECG 12 Lead Documentation Only  Date/Time: 12/11/2019 12:49 PM  Performed by: Mitch Amor DO  Authorized by: Mitch Amor DO     ECG reviewed by me, the ED Provider: yes    Patient location:  ED  Previous ECG:     Previous ECG:  Compared to current    Similarity:  No change  Interpretation:     Interpretation: normal    Rate:     ECG rate assessment: normal    Rhythm:     Rhythm: sinus rhythm    Ectopy:     Ectopy: none    QRS:     QRS axis:  Normal    QRS intervals:  Normal  Conduction:     Conduction: normal    ST segments:     ST segments:  Normal  T waves:     T waves: normal    Other findings:     Other findings: LVH               ED Course  ED Course as of Dec 11 1446   Wed Dec 11, 2019   1405 RSV PCR(!): Detected   1439    1   Tree-in-bud micronodular infiltrates throughout both lungs, likely representing infectious or inflammatory bronchiolitis       2  Small focus of consolidation medial left lower lobe may represent atelectasis or pneumonia      3  No acute intra-abdominal or pelvic abnormality      4  Mild thickening left adrenal gland  Although its imaging features are indeterminate, it does not have suspicious imaging features (heterogeneity, necrosis, irregular margins),  but based on its size, a dedicated adrenal protocol CT is recommended at   this time to confirm benignity       5  Asymmetric right breast tissue  This has been evaluated on recent right breast ultrasound from October of this year  Please see this report      6  Suspected fibroid uterus as above  Findings can be corroborated with nonemergent pelvic ultrasound if relevant             1439 I discussed these findings with the patient about the pneumonia as well as the adrenal issue and abnormal breast tissue  She is followed up for the breast tissue before informed she would need further follow-up for the adrenal problem  Patient with noted      1439  mild hypoxia requiring O2 supplementation at 2 L as well as dehydration and IV fluids to plan will be for admission  Will cover with broad-spectrum antibiotics  No evidence of sepsis at this point time  MDM  Number of Diagnoses or Management Options  Dehydration: new and requires workup  Pneumonia: new and requires workup  RSV (acute bronchiolitis due to respiratory syncytial virus): new and requires workup  Diagnosis management comments: 2:45 PM   Spoke with the admitting team  Accepted the pt for admission  Spoke with the pt and they are in agreement to stay for further eval and admission            Amount and/or Complexity of Data Reviewed  Clinical lab tests: ordered and reviewed  Tests in the radiology section of CPT®: ordered and reviewed  Review and summarize past medical records: yes  Independent visualization of images, tracings, or specimens: yes          Disposition  Final diagnoses:   Pneumonia   Dehydration   RSV (acute bronchiolitis due to respiratory syncytial virus)     Time reflects when diagnosis was documented in both MDM as applicable and the Disposition within this note     Time User Action Codes Description Comment    12/11/2019  2:41 PM Jessee Childress Add [J18 9] Pneumonia     12/11/2019  2:41 PM Moreen Schaumann R Add [E86 0] Dehydration     12/11/2019  2:41 PM Jessee Foil Add [J21 0] RSV (acute bronchiolitis due to respiratory syncytial virus)       ED Disposition     ED Disposition Condition Date/Time Comment    Admit Stable Wed Dec 11, 2019  2:41 PM       Follow-up Information    None         Patient's Medications   Discharge Prescriptions    No medications on file     No discharge procedures on file      ED Provider  Electronically Signed by           Gil Kuo DO  12/11/19 0277

## 2019-12-11 NOTE — ASSESSMENT & PLAN NOTE
Pneumonia likely due to RSV as evident by RSV PCR positive  Has had symptoms since Sunday that included cough and malaise and weakness  She is afebrile at 97 8 and has no leukocytosis, WBC is 9 9  Pulse ox 95  CT chest Imagining shows: 1  Tree-in-bud micronodular infiltrates throughout both lungs, likely representing infectious or inflammatory bronchiolitis  2  Small focus of consolidation medial left lower lobe may represent atelectasis or pneumonia  - admit for observation overnight  - Treat conservatively since due to viral   - give IV fluids at 80 cc per hour since not dehydrated  - treat her cough with Robitussin and Benzonatate (tessalom pearls)  - Maintain O2 stat above 94%

## 2019-12-11 NOTE — ASSESSMENT & PLAN NOTE
Chronic, Still complains of this urinary incontinence, however she denies dysuria       Will monitor I/O's and bladder scan if necessary

## 2019-12-11 NOTE — ASSESSMENT & PLAN NOTE
Blood pressure currently at 125/91 and has been elevated once to 141/88 with a goal of less that 140/90       She will be restarted on her home medication which include:     - amlodipine 5 mg once daily   - losartan 100 mg once daily   -metoprolol 100 mg once daily

## 2019-12-11 NOTE — ASSESSMENT & PLAN NOTE
Potassium giuseppe at 3 2  Most likely due to poor P O  Intake for a day  - will give a one time dose of potassium chloride  40 meq and will reassess her tomorrow with a BMP

## 2019-12-11 NOTE — ED PROCEDURE NOTE
PROCEDURE  CriticalCare Time  Performed by: Sheri Nolan DO  Authorized by: Sheri Nolan DO     Critical care provider statement:     Critical care time (minutes):  40    Critical care time was exclusive of:  Separately billable procedures and treating other patients and teaching time    Critical care was necessary to treat or prevent imminent or life-threatening deterioration of the following conditions: Mild hypoxia requiring O2 supplementation with noted a pulse ox of 91% on room air      Critical care was time spent personally by me on the following activities:  Blood draw for specimens, obtaining history from patient or surrogate, development of treatment plan with patient or surrogate, evaluation of patient's response to treatment, examination of patient, ordering and performing treatments and interventions, ordering and review of laboratory studies, ordering and review of radiographic studies, re-evaluation of patient's condition and review of old [address] Nix DO  12/11/19 7656

## 2019-12-11 NOTE — ASSESSMENT & PLAN NOTE
An incidental finding on CT  She is asymptomatic  Will follow up with PCP or endocrinologist outpatient

## 2019-12-12 VITALS
TEMPERATURE: 98.4 F | HEART RATE: 72 BPM | HEIGHT: 63 IN | RESPIRATION RATE: 18 BRPM | DIASTOLIC BLOOD PRESSURE: 86 MMHG | WEIGHT: 89.95 LBS | BODY MASS INDEX: 15.94 KG/M2 | SYSTOLIC BLOOD PRESSURE: 127 MMHG | OXYGEN SATURATION: 95 %

## 2019-12-12 PROBLEM — E87.6 HYPOKALEMIA: Status: RESOLVED | Noted: 2019-12-11 | Resolved: 2019-12-12

## 2019-12-12 LAB
ANION GAP SERPL CALCULATED.3IONS-SCNC: 8 MMOL/L (ref 5–14)
ATRIAL RATE: 97 BPM
BASOPHILS # BLD AUTO: 0 THOUSANDS/ΜL (ref 0–0.1)
BASOPHILS NFR BLD AUTO: 1 % (ref 0–1)
BUN SERPL-MCNC: 7 MG/DL (ref 5–25)
CALCIUM SERPL-MCNC: 9.4 MG/DL (ref 8.4–10.2)
CHLORIDE SERPL-SCNC: 103 MMOL/L (ref 97–108)
CO2 SERPL-SCNC: 28 MMOL/L (ref 22–30)
CREAT SERPL-MCNC: 0.54 MG/DL (ref 0.6–1.2)
EOSINOPHIL # BLD AUTO: 0.3 THOUSAND/ΜL (ref 0–0.4)
EOSINOPHIL NFR BLD AUTO: 4 % (ref 0–6)
ERYTHROCYTE [DISTWIDTH] IN BLOOD BY AUTOMATED COUNT: 13.3 %
GFR SERPL CREATININE-BSD FRML MDRD: 121 ML/MIN/1.73SQ M
GLUCOSE P FAST SERPL-MCNC: 105 MG/DL (ref 70–99)
GLUCOSE SERPL-MCNC: 105 MG/DL (ref 70–99)
HCT VFR BLD AUTO: 36.7 % (ref 36–46)
HGB BLD-MCNC: 12.3 G/DL (ref 12–16)
LYMPHOCYTES # BLD AUTO: 1.3 THOUSANDS/ΜL (ref 0.5–4)
LYMPHOCYTES NFR BLD AUTO: 17 % (ref 25–45)
MCH RBC QN AUTO: 28.4 PG (ref 26–34)
MCHC RBC AUTO-ENTMCNC: 33.5 G/DL (ref 31–36)
MCV RBC AUTO: 85 FL (ref 80–100)
MONOCYTES # BLD AUTO: 1.1 THOUSAND/ΜL (ref 0.2–0.9)
MONOCYTES NFR BLD AUTO: 15 % (ref 1–10)
NEUTROPHILS # BLD AUTO: 4.8 THOUSANDS/ΜL (ref 1.8–7.8)
NEUTS SEG NFR BLD AUTO: 64 % (ref 45–65)
P AXIS: 73 DEGREES
PLATELET # BLD AUTO: 420 THOUSANDS/UL (ref 150–450)
PMV BLD AUTO: 8.2 FL (ref 8.9–12.7)
POTASSIUM SERPL-SCNC: 3.7 MMOL/L (ref 3.6–5)
PR INTERVAL: 146 MS
QRS AXIS: 80 DEGREES
QRSD INTERVAL: 80 MS
QT INTERVAL: 350 MS
QTC INTERVAL: 444 MS
RBC # BLD AUTO: 4.31 MILLION/UL (ref 4–5.2)
SODIUM SERPL-SCNC: 139 MMOL/L (ref 137–147)
T WAVE AXIS: 61 DEGREES
VENTRICULAR RATE: 97 BPM
WBC # BLD AUTO: 7.6 THOUSAND/UL (ref 4.5–11)

## 2019-12-12 PROCEDURE — 80048 BASIC METABOLIC PNL TOTAL CA: CPT | Performed by: FAMILY MEDICINE

## 2019-12-12 PROCEDURE — NC001 PR NO CHARGE: Performed by: FAMILY MEDICINE

## 2019-12-12 PROCEDURE — 99219 PR INITIAL OBSERVATION CARE/DAY 50 MINUTES: CPT | Performed by: FAMILY MEDICINE

## 2019-12-12 PROCEDURE — 93010 ELECTROCARDIOGRAM REPORT: CPT | Performed by: INTERNAL MEDICINE

## 2019-12-12 PROCEDURE — 85025 COMPLETE CBC W/AUTO DIFF WBC: CPT | Performed by: FAMILY MEDICINE

## 2019-12-12 RX ORDER — BENZONATATE 100 MG/1
100 CAPSULE ORAL 3 TIMES DAILY PRN
Qty: 20 CAPSULE | Refills: 0 | Status: SHIPPED | OUTPATIENT
Start: 2019-12-12 | End: 2019-12-18 | Stop reason: SDUPTHER

## 2019-12-12 RX ORDER — GUAIFENESIN/DEXTROMETHORPHAN 100-10MG/5
10 SYRUP ORAL EVERY 4 HOURS PRN
Qty: 118 ML | Refills: 0 | Status: SHIPPED | OUTPATIENT
Start: 2019-12-12 | End: 2019-12-18 | Stop reason: SDUPTHER

## 2019-12-12 RX ADMIN — METOPROLOL SUCCINATE 100 MG: 50 TABLET, EXTENDED RELEASE ORAL at 08:38

## 2019-12-12 RX ADMIN — Medication: at 08:39

## 2019-12-12 RX ADMIN — LOSARTAN POTASSIUM 100 MG: 50 TABLET, FILM COATED ORAL at 08:39

## 2019-12-12 RX ADMIN — GUAIFENESIN AND DEXTROMETHORPHAN 10 ML: 100; 10 SYRUP ORAL at 08:39

## 2019-12-12 RX ADMIN — BENZONATATE 100 MG: 100 CAPSULE ORAL at 05:39

## 2019-12-12 RX ADMIN — AMLODIPINE BESYLATE 5 MG: 5 TABLET ORAL at 08:38

## 2019-12-12 RX ADMIN — SODIUM CHLORIDE 80 ML/HR: 0.9 INJECTION, SOLUTION INTRAVENOUS at 05:38

## 2019-12-12 RX ADMIN — IBUPROFEN 800 MG: 400 TABLET ORAL at 08:38

## 2019-12-12 NOTE — UTILIZATION REVIEW
Initial Clinical Review    Admission: Date/Time/Statement:  12/11 @ 1447 to OBSERVATION    Orders Placed This Encounter   Procedures    Place in Observation (expected length of stay for this patient is less than two midnights)     Standing Status:   Standing     Number of Occurrences:   1     Order Specific Question:   Admitting Physician     Answer:   Jimbo Laws [[id number]]     Order Specific Question:   Level of Care     Answer:   Med Surg [[id number]]     ED Arrival Information     Expected Arrival Acuity Means of Arrival Escorted By Service Admission Type    - 12/11/2019 12:16 Urgent Walk-In Spouse General Medicine Urgent    Arrival Complaint    cold/flu like symptoms  fever/back pain        Chief Complaint   Patient presents with    Cold Like Symptoms     states has been in bed past 6 days with cough and head congestion, pain in stomach, back and throat from coughing   has the same; also having fevers; states not taking blood pressures medications because stomach hurts; also feeling dizzy and nauseous  Assessment/Plan:  63 yo female presents to ED with productive cough and epigastric pain that started since Sunday;  yellow and green phlegm, malaise, weakness  + HA and chronic back pain  5 lb weight loss past 2 weeks   ill with same  On exam: + wheezes, tachycardic  RSV +  Admitted to OBS with Pneumonia secondary to RSV   Plan: IVF's, Robitussin and Benzonatate, replete K and  Check BMP in am     ED Triage Vitals [12/11/19 1239]   Temperature Pulse Respirations Blood Pressure SpO2   97 8 °F (36 6 °C) (!) 114 18 121/84 91 %      Temp Source Heart Rate Source Patient Position - Orthostatic VS BP Location FiO2 (%)   Tympanic Monitor Standing Left arm --      Pain Score       8        Wt Readings from Last 1 Encounters:   12/11/19 40 8 kg (89 lb 15 2 oz)     Additional Vital Signs:   12/12/19 0748  97 4 °F (36 3 °C) 93 20 158/106  97 % None (Room air) Sitting   12/11/19 2328  97 6 °F (36 4 °C) 76 14 133/96 95 % None (Room air) Lying   12/11/19 1929  97 4 °F (36 3 °C)Abnormal  95 12 121/72 95 % None (Room air) Lying   12/11/19 1620  97 8 °F (36 6 °C) 88 18 125/91 93 % None (Room air) Lying   12/11/19 1452   94 19 141/88 95 % None (Room air) Lying       Pertinent Labs/Diagnostic Test Results:   Results from last 7 days   Lab Units 12/12/19  0506 12/11/19  1307   WBC Thousand/uL 7 60 9 90   HEMOGLOBIN g/dL 12 3 13 7   HEMATOCRIT % 36 7 40 3   PLATELETS Thousands/uL 420 440   NEUTROS ABS Thousands/µL 4 80  --    TOTAL NEUT ABS Thousand/uL  --  7 03   BANDS PCT %  --  3     Results from last 7 days   Lab Units 12/12/19  0546 12/11/19  1307   SODIUM mmol/L 139 140   POTASSIUM mmol/L 3 7 3 2*   CHLORIDE mmol/L 103 96*   CO2 mmol/L 28 30   ANION GAP mmol/L 8 14   BUN mg/dL 7 14   CREATININE mg/dL 0 54* 0 65   EGFR ml/min/1 73sq m 121 114   CALCIUM mg/dL 9 4 9 9     Results from last 7 days   Lab Units 12/11/19  1307   AST U/L 27   ALT U/L 20   ALK PHOS U/L 65   TOTAL PROTEIN g/dL 8 4   ALBUMIN g/dL 4 3   TOTAL BILIRUBIN mg/dL 1 00     Results from last 7 days   Lab Units 12/12/19  0546 12/11/19  1307   GLUCOSE RANDOM mg/dL 105* 123*     Results from last 7 days   Lab Units 12/11/19  1307   CK TOTAL U/L 138*   CK MB INDEX % <1 0   CK MB ng/mL 0 5     Results from last 7 days   Lab Units 12/11/19  1307   TROPONIN I ng/mL <0 01     Results from last 7 days   Lab Units 12/11/19  1312   LACTIC ACID mmol/L 1 1     Results from last 7 days   Lab Units 12/11/19  1307   NT-PRO BNP pg/mL 315*     Results from last 7 days   Lab Units 12/11/19  1309   INFLUENZA A PCR  None Detected   RSV PCR  Detected*     12/11 CXR: No lobar infiltrates  Subtle interstitial basilar prominence  12/11: CT C/A/P: Tree-in-bud micronodular infiltrates throughout both lungs, likely representing infectious or inflammatory bronchiolitis  2  Small focus of consolidation medial left lower lobe may represent atelectasis or pneumonia    3  No acute intra-abdominal or pelvic abnormality  4  Mild thickening left adrenal gland  Although its imaging features are indeterminate, it does not have suspicious imaging features (heterogeneity, necrosis, irregular margins),  but based on its size, a dedicated adrenal protocol CT is recommended at this time to confirm benignity  5  Asymmetric right breast tissue  This has been evaluated on recent right breast ultrasound from October of this year  Please see this report    6  Suspected fibroid uterus  12/11 EKG:  NSR    ED Treatment:   Medication Administration from 12/11/2019 1216 to 12/11/2019 1613       Date/Time Order Dose Route Action     12/11/2019 1316 sodium chloride 0 9 % bolus 1,000 mL 1,000 mL Intravenous New Bag     12/11/2019 1311 morphine injection 2 mg 2 mg Intravenous Given     12/11/2019 1420 ketorolac (TORADOL) injection 15 mg 15 mg Intravenous Given        Past Medical History:   Diagnosis Date    Asthma     History of transfusion     Hypertension      Present on Admission:   Pneumonia due to respiratory syncytial virus (RSV)    Essential hypertension   Urinary incontinence without sensory awareness      Admitting Diagnosis: Dehydration [E86 0]  COLD (chronic obstructive lung disease) (HCC) [J44 9]  RSV (acute bronchiolitis due to respiratory syncytial virus) [J21 0]  Pneumonia [J18 9]  Age/Sex: 64 y o  female     Admission Orders:  Scheduled Medications:  Medications:  amLODIPine 5 mg Oral Daily   hydrocortisone  Topical BID   losartan 100 mg Oral Daily   metoprolol succinate 100 mg Oral Daily     Continuous IV Infusions: NSS @ 80 / hr - DC'd 12/12    PRN Meds:  benzonatate 100 mg Oral TID PRN x 1 12/11 & x 1 12/12   dextromethorphan-guaiFENesin 10 mL Oral Q4H PRN x 1 12/11 & x 1 12/12   ibuprofen 800 mg Oral Q8H PRN x 1 12/11 & x 1 12/12   white petrolatum-mineral oil  Topical PRN     KDUR 40 mEq po x 1    Droplet Isolation    Network Utilization Review Department  [email or web address]  ATTENTION: Please call with any questions or concerns to [phone] and carefully listen to the prompts so that you are directed to the right person  All voicemails are confidential   Mani Portillo all requests for admission clinical reviews, approved or denied determinations and any other requests to dedicated fax number below belonging to the campus where the patient is receiving treatment   List of dedicated fax numbers for the Facilities:  [address] DENIALS (Administrative/Medical Necessity) [phone]   [address] (Maternity/NICU/Pediatrics) [phone]   [facility] Needs [phone]   Christine Nix [phone]   David Lewis [phone]   [facility] [address] [place] [address] [phone]   [facility] [phone]   Juancho Barton [address] 1000 [facility] [phone]

## 2019-12-12 NOTE — DISCHARGE SUMMARY
Discharge Summary - Zenon Ball    Patient Information: Lisseth Bennett 64 y o  female MRN: [id number]  Unit/Bed#: 7T [facility] 705-02 Encounter: [id number]    Discharging Physician / Practitioner: Nallely Kruse DO    PCP: Valeria Corea MD  Admission Date:   Admission Orders (From admission, onward)     Ordered        12/11/19 1447  Place in Observation (expected length of stay for this patient is less than two midnights)  Once                   Discharge Date: 12/12/19    Reason for Admission: RSV pneumonia     Discharge Diagnoses:     Principal Problem:    Pneumonia due to respiratory syncytial virus (RSV)   Active Problems:    Essential hypertension    Urinary incontinence without sensory awareness    Adrenal hyperplasia ([address] )  Resolved Problems:    Hypokalemia      Consultations During Hospital Stay:  · None    Procedures Performed:   · None     Significant Findings / Test Results:   · None     Incidental Findings:   · Mild thickening left adrenal gland (recommend dedicated adrenal protocol CT to confirm benignity)    · Asymmetric right breast tissue   · Suspected fibroid (can be corroborated with nonemergent pelvic ultrasound if needed)     Test Results Pending at Discharge (will require follow up): · None      Outpatient Tests Requested:  · None     Outpatient follow-up Requested:   PCP       Complications:  None     Hospital Course:     Lisseth Bennett is a 64 y o  female patient who originally presented to the hospital on 12/11/2019 due to a productive cough that has been going on for 4 days  She reported a headache, weakness and back pain all related to the amount of her coughing  She secondarily reported urinary frequency which she had before her symptoms started  She is noticeably underweight and reports a 5 lbs weight loss in the past 2 weeks  She denied chest pain, shortness of breath or abdominal pain       In the ED she received morphine 2mg and 1 liter bolus of normal saline  EKG showed normal sinus rhythm with no ST segment abnormality  RSV PCR came back positive  CT showed suspected infectious or inflammatory bronchiolitis throughout both lungs as well as a small consolidation in he left lower lobe that may represent atelectasis or pneumonia  She was admitted for suspected RSV pneumonia and for symptomatic care since she felt uncomfortable going home  We admitted her to hydrate her by IV and treated her cough with robitussin and tassalon perles  Her symptoms slightly improved  We stressed that since her pneumonia is due to a virus, there is no need to prescribe antibiotics  She will continue symptomatic management  We restarted her home medications on discharge  Condition at Discharge: stable     Discharge Day Visit / Exam:     Vitals: Blood Pressure: 127/86 (12/12/19 1527)  Pulse: 72 (12/12/19 1527)  Temperature: 98 4 °F (36 9 °C) (12/12/19 1527)  Temp Source: Temporal (12/12/19 1527)  Respirations: 18 (12/12/19 1527)  Height: 5' 3" (160 cm) (12/11/19 1620)  Weight - Scale: 40 8 kg (89 lb 15 2 oz) (12/11/19 1620)  SpO2: 95 % (12/12/19 1527)  Exam:   Physical Exam   Constitutional: She is oriented to person, place, and time  She appears cachectic  HENT:   Head: Normocephalic and atraumatic  Eyes: EOM are normal    Neck: Normal range of motion  Neck supple  Cardiovascular: Normal rate, regular rhythm and normal heart sounds  Pulmonary/Chest: Effort normal  She has wheezes  She exhibits no tenderness  Abdominal: Soft  She exhibits no distension  There is no tenderness  Neurological: She is alert and oriented to person, place, and time  Skin: Skin is warm and dry  Capillary refill takes less than 2 seconds  Discussion with Family: No family present at discharge    Discharge instructions/Information to patient and family:   See after visit summary for information provided to patient and family        Discharge Medications:   acetaminophen 650 mg CR tablet   Take 1 tablet (650 mg total) by mouth every 8 (eight) hours as needed for mild pain          amLODIPine 5 mg tablet    Take 1 tablet (5 mg total) by mouth daily           benzonatate 100 mg capsule   Take 1 capsule (100 mg total) by mouth 3 (three) times a day as needed for cough           dextromethorphan-guaiFENesin  mg/5 mL syrup   Take 10 mL by mouth every 4 hours as needed (cough first line)           ENSURE ORIGINAL Liqd   Take 1 Bottle by mouth daily          hydrocortisone 1 % ointment   Apply topically 2 (two) times a day          losartan 100 MG tablet   Take 1 tablet (100 mg total) by mouth daily          metoprolol succinate 100 mg 24 hr tablet   Take 1 tablet (100 mg total) by mouth daily          mineral oil-hydrophilic petrolatum ointment   Apply topically as needed for dry skin              Provisions for Follow-Up Care:  See after visit summary for information related to follow-up care and any pertinent home health orders  Disposition:     Home    For Discharges to [facility]:   · Not Applicable to this Patient - Not Applicable to this Patient    Planned Readmission: None      Discharge Statement:  I spent 30 minutes discharging the patient  This time was spent on the day of discharge  I had direct contact with the patient on the day of discharge  Greater than 50% of the total time was spent examining patient, answering all patient questions, arranging and discussing plan of care with patient as well as directly providing post-discharge instructions  Additional time then spent on discharge activities      ** Please Note: This note has been constructed using a voice recognition system Darleen Guerra  12/12/19  4:41 PM

## 2019-12-12 NOTE — DISCHARGE INSTRUCTIONS
Neumonía viral   LO QUE NECESITA SABER:   La neumonía viral es paco infección pulmonar provocada por un virus, elvin el de la influenza (gripe)  Usted también puede contraer paco infección viral al respirarlo o tocar algo que tenga el virus  Es posible desarrollar neumonía viral cuando un virus que se encuentra dentro de macedo cuerpo viaja hasta ashlee pulmones  INSTRUCCIONES SOBRE [facility]ITAL[facility]IA:   Regrese a la asia de emergencias si:   · Usted tiene más dificultad para respirar o macedo respiración parece ser más rápida de lo normal      · Ashlee labios o uñas de los dedos se ponen azules  · Usted está confundido y no puede pensar con claridad  · Usted orina menos o no [place]  Pregúntele a macedo Maylon Ape vitaminas y minerales son adecuados para usted  · Ashlee síntomas no mejoran o más kristie [address] [facility], aún después del [place]  · Usted tiene preguntas o inquietudes acerca de macedo condición o cuidado  Medicamentos:  Es posible que usted necesite alguno de los siguientes:  · Medicamentos antivirales  se administran para tratar Eligha Arias infección a causa de un virus  Los antivirales actúan mejor si se rajiv dentro de las 67 horas de que comenzó la infección  Después de 72 horas, el medicamento todavía puede ayudar a acortar la cantidad de [employer] virus, o a reducir ashlee síntomas  · Acetaminofeno:  arely el dolor y baja la fiebre  Está disponible sin receta médica  Pregunte la cantidad y la frecuencia con que debe tomarlos  [address]  [place]enia las etiquetas de todos los demás medicamentos que esté usando para saber si también contienen acetaminofén, o pregunte a macedo médico o farmacéutico  El acetaminofén puede causar daño en el hígado cuando no se marybeth de forma correcta  No use más de 4 gramos (4000 miligramos) en total de acetaminofeno en un día  · AINEs (Analgésicos antiinflamatorios no esteroides) elvin el ibuprofeno, ayudan a disminuir la inflamación, el dolor y la [place]   Daniela medicamento esta disponible con o sin paco receta médica  Los AINEs pueden causar sangrado estomacal o problemas renales en ciertas personas  Si usted marybeth un medicamento anticoagulante, siempre pregúntele a macedo médico si los SASHA son seguros para usted  Siempre bao la etiqueta de chioma medicamento y [place] instrucciones  · [place] mega medicamentos elvin se le haya indicado  Consulte con macedo médico si usted randy que macedo medicamento no le está ayudando o si presenta efectos secundarios  Infórmele si es alérgico a cualquier medicamento  Mantenga paco lista actualizada de los [employer], las vitaminas y los productos herbales que marybeth  Incluya los siguientes datos de los medicamentos: cantidad, frecuencia y motivo de administración  Traiga con usted la lista o los envases de la píldoras a mega citas de seguimiento  Lleve la lista de los medicamentos con usted en jeremiah de paco emergencia  [place] de macedo síntomas:   · Descanse tanto elvin sea necesario  Descanse con frecuencia mientras se recupera  Empiece a hacer un poco más día a día  · [address] se le haya indicado  Pregunte cuánto líquido debe wali cada día y cuáles líquidos son los más adecuados para usted  Los líquidos ayudan a [employer], lo cual le facilita la expectoración de secreciones  · No fume  Evite el humo del cigarrillo cuando alguien más está fumando  El fumar dificulta macedo recuperación  La nicotina y otras sustancias químicas que contienen los cigarrillos y cigarros pueden dañar los pulmones  Pida información a macedo médico si usted actualmente fuma y necesita ayuda para dejar de fumar  Los cigarrillos electrónicos o tabaco sin humo todavía contienen nicotina  Consulte con macedo médico antes de [employer]  · Use un humidificador de vapor frío  El humidificador le ayudará a aumentar la humedad del aire en macedo hogar  [place] podría ayudarle a respirar más fácilmente y al mismo tiempo disminuir la tos       · Conor Lusty macedo evan elevada  Podría respirar mejor si se acuesta con la cabecera de mariscal cama para [place]  Evite paco neumonía viral:   · Evite la propagación de gérmenes  Lávese las kamala frecuentemente con agua y [place]  Use un gel desinfectante para las kamala cuando no hay jabón ni agua disponible  No se toque los ojos, la nariz o la boca a menos que se haya lavado las kamala gladys  Cúbrase la boca al toser  Tosa en un pañuelo desechable o en la manga de mariscal camisa para no contagiar los gérmenes con mega kamala  Si usted está enfermo, manténgase alejado de otras personas lo más que pueda  · [place] vacunas  Es posible que usted necesite recibir paco vacuna que sirve para prevenir la neumonía  Acuda a que le apliquen la vacuna de la influenza (gripe) cada año tan pronto elvin esté disponible  Acuda a mega consultas de control con mariscal médico según le indicaron  Anote mega preguntas para que se acuerde de hacerlas zev mega visitas  © 2017 [address] Sean Whitney Information is for End User's use only and may not be sold, redistributed or otherwise used for commercial purposes  All illustrations and images included in CareNotes® are the copyrighted property of A D A M , Inc  or Primo Rust  Esta información es sólo para uso en educación  Mariscal intención no es darle un consejo médico sobre enfermedades o tratamientos  Colsulte con mariscal [place] farmacéutico antes de seguir cualquier régimen médico para saber si es seguro y efectivo para usted  Hipertensión crónica   LO QUE NECESITA SABER:   La hipertensión es la presión arterial zach  La presión arterial es la fuerza que ejerce la laura contra las ocasio de las arterias  La presión arterial normal debería estar a menos de 120/80  La pre-hipertensión estaría entre 120/80 y 139/ 80  La presión arterial zach estaría a 140/90 o más zach   La hipertensión causa que mariscal presión arterial se eleve tanto que mariscal corazón se ve forzado a trabajar mucho más juanita de lo normal  [place] puede dañar macedo corazón  La hipertensión crónica es paco condición de roxi plazo que usted puede controlar con un estilo de asya leanne o con medicamentos  La presión [place] a proteger mega órganos elvin macedo corazón, pulmones, cerebro, y riñones  INSTRUCCIONES SOBRE EL EFREN HOSPITALARIA:   Llame al 911 en jeremiah de presentar lo siguiente:   · Usted tiene malestar en el pecho que se siente elvin estrujamiento, presión, Nataliia Pica o dolor  · Usted se siente confundido o tiene dificultad para hablar  · Repentinamente se siente aturdido o con dificultad para respirar  · Usted tiene dolor o [employer] espalda, [place], [place], abdomen o Amedeo Crumble  Busque atención médica de inmediato si:   · Usted tiene un juanita dolor de evan o pérdida de la visión  · Usted tiene debilidad en un brazo o en paco pierna  Pregúntele a macedo [place]  vitaminas y minerales son adecuados para usted  · Usted se siente mareado, confundido, somnoliento o elvin si se fuera a desmayar  · Usted se ha tomado macedo medicamento para la presión arterial tara macedo presión arterial todavía está más efren de lo que le indicó macedo médico     · Usted tiene preguntas o inquietudes acerca de macedo condición o cuidado  Medicamentos:  Es posible que usted necesite alguno de los siguientes:  · Medicamento  podría usarse para ayudar a disminuir la presión arterial  Es posible que necesite más de un tipo de [place]  [place] el medicamento exactamente elvin indicado  · Diuréticos  ayudan a eliminar el exceso de líquido que se acumula en el organismo  [place] contribuirá a bajar macedo presión arterial  Es posible que orine más seguido mientras marybeth chioma medicamento  · Los medicamentos para el colesterol  ayudan a bajar los niveles de [place]  Un nivel bajo de colesterol ayuda a prevenir enfermedades cardíacas y facilita el control de la presión arterial      · [place] mega medicamentos elvni se le haya indicado  Consulte con macedo médico si usted randy que macedo medicamento no le está ayudando o si presenta efectos secundarios  Infórmele si es alérgico a cualquier medicamento  Mantenga paco lista actualizada de los [place], las vitaminas y los productos herbales que marybeth  Incluya los siguientes datos de los medicamentos: cantidad, frecuencia y motivo de administración  Traiga con usted la lista o los envases de la píldoras a mega citas de seguimiento  Lleve la lista de los medicamentos con usted en jeremiah de paco emergencia  Acuda a mega consultas de control con macedo médico según le indicaron  Usted necesitará regresar para medir macedo presión arterial y realizar otros exámenes de laboratorio  Anote mega preguntas para que se acuerde de hacerlas zev mega visitas  Controle la hipertensión crónica:  Hable con macedo médico sobre las siguientes recomendaciones y otras formas de controlar la hipertensión:  · Tómese la presión arterial en macedo casa  Siéntese y descanse por 5 minutos antes de tomarse la presión arterial  Extienda macedo brazo y apóyelo en paco superficie plana  Macedo brazo debe estar a la misma altura que macedo corazón  Siga las instrucciones que vienen con el monitor para la presión arterial o tensiómetro  Si es posible tome por lo menos 2 lecturas de la presión cada vez  Tómese la presión arterial por lo [place] al día a la misma hora todos los días, paco en la mañana y la otra en la noche  Mantenga un registro de las lecturas de macedo presión arterial y llévelo consigo a mega consultas  Pregúntele a maceod médico cuál debería ser macedo presión arterial            · Limite el sodio (la sal) elvin se le haya indicado  Demasiado sodio puede afectar el equilibrio de líquidos  Revise las etiquetas para buscar alimentos bajos en sodio o sin sal agregada  Algunos alimentos bajos en sodio utilizan sales de potasio para añadir sabor  Demasiado potasio también puede causar problemas de [place]   Macedo médico le dirá qué cantidad de sodio y potasio es levy para el consumo en un día  Él puede recomendarle que limite el sodio a 2,300 mg al día  · Siga el plan de comidas recomendado por mariscal médico   Un dietista o médico puede darle más información sobre planes de bajo contenido de sodio o el plan de alimentación DASH (enfoques dietéticos para detener la hipertensión)  El plan DASH es bajo en sodio, grasas saturadas y grasa total  Es alto en potasio, calcio y [place]  · Ejercítese para mantener un peso saludable  Realice actividad física por lo menos 30 minutos al día, la mayoría de los días de la [place] ayudará a bajar mariscal presión arterial  Pida más información acerca de un plan de ejercicio adecuado para usted  · [address] [place] [address] estrés  [place] podría ayudarlo a bajar mariscal presión arterial  Aprenda sobre formas de relajarse, elvin respiración profunda o escuchar música  · Limite el consumo de alcohol  Las mujeres deberían limitar el consumo de alcohol a 1 bebida por día  Los hombres deberían limitar el consumo de alcohol a 2 tragos al día  Un trago equivale a 12 onzas de cerveza, 5 onzas de vino o 1 onza y ½ de licor  · No fume  La nicotina y otros químicos en los cigarrillos y cigarros pueden aumentar mariscal presión arterial y también pueden provocar daño al pulmón  Pida información a mariscal médico si usted actualmente fuma y necesita ayuda para dejar de fumar  Los cigarrillos electrónicos o tabaco sin humo todavía contienen nicotina  Consulte con mariscal médico antes de [employer]  © 2017 2600 Sean Whitney Information is for End User's use only and may not be sold, redistributed or otherwise used for commercial purposes  All illustrations and images included in CareNotes® are the copyrighted property of A D A M , Inc  or Primo Rust  Esta información es sólo para uso en educación  Mariscal intención no es darle un consejo médico sobre enfermedades o tratamientos   Colsulte con mariscal Allyn Sahu antes de seguir cualquier régimen médico para saber si es seguro y efectivo para usted  Neumonía viral   LO QUE NECESITA SABER:   La neumonía viral es paco infección pulmonar provocada por un virus, elvin el de la influenza (gripe)  Usted también puede contraer paco infección viral al respirarlo o tocar algo que tenga el virus  Es posible desarrollar neumonía viral cuando un virus que se encuentra dentro de macedo cuerpo viaja hasta ashlee pulmones  INSTRUCCIONES SOBRE EL EFREN HOSPITALARIA:   Busque atención médica de inmediato si:   · Usted tiene más dificultad para respirar o macedo respiración parece ser más rápida de lo normal      · Ashlee labios o uñas de los dedos se ponen azules  · Usted está confundido y no puede pensar con claridad  · Usted orina menos o no [place]  Pregúntele a macedo Starlette Mems vitaminas y minerales son adecuados para usted  · Ashlee síntomas no mejoran o más kristie [address] [facility], aún después del [place]  · Usted tiene preguntas o inquietudes acerca de macedo condición o cuidado  Medicamentos:  Es posible que usted necesite alguno de los siguientes:  · Medicamentos antivirales  se administran para tratar [place] infección a causa de un virus  Los antivirales actúan mejor si se rajiv dentro de las 67 horas de que comenzó la infección  Después de 72 horas, el medicamento todavía puede ayudar a acortar la cantidad de [employer] virus, o a reducir ashlee síntomas  · Acetaminofeno:  arely el dolor y baja la fiebre  Está disponible sin receta médica  Pregunte la cantidad y la frecuencia con que debe tomarlos  [address]  Y[place]enia las etiquetas de todos los demás medicamentos que esté usando para saber si también contienen acetaminofén, o pregunte a macedo médico o farmacéutico  El acetaminofén puede causar daño en el hígado cuando no se marybeth de forma correcta  No use más de 4 gramos (4000 miligramos) en total de acetaminofeno en un día       · AINEs (Analgésicos antiinflamatorios no esteroides) elvin el ibuprofeno, ayudan a disminuir la inflamación, el dolor y la [place]  Daniela medicamento esta disponible con o sin paco receta médica  Los AINEs pueden causar sangrado estomacal o problemas renales en ciertas personas  Si usted marybeth un medicamento anticoagulante, siempre pregúntele a macedo médico si los SASHA son seguros para usted  Siempre bao la etiqueta de daniela medicamento y [place] instrucciones  · [place] mega medicamentos elvin se le haya indicado  Consulte con macedo médico si usted randy que macedo medicamento no le está ayudando o si presenta efectos secundarios  Infórmele si es alérgico a cualquier medicamento  Mantenga apco lista actualizada de los [place], las vitaminas y los productos herbales que marybeth  Incluya los siguientes datos de los medicamentos: cantidad, frecuencia y motivo de administración  Traiga con usted la lista o los envases de la píldoras a mega citas de seguimiento  Lleve la lista de los medicamentos con usted en jeremiah de paco emergencia  [place] de macedo síntomas:   · Descanse tanto elvin sea necesario  Descanse con frecuencia mientras se recupera  Empiece a hacer un poco más día a día  · [address] se le haya indicado  Pregunte cuánto líquido debe wali cada día y cuáles líquidos son los más adecuados para usted  Los líquidos ayudan a [employer], lo cual le facilita la expectoración de secreciones  · No fume  Evite el humo del cigarrillo cuando alguien más está fumando  El fumar dificulta macedo recuperación  La nicotina y otras sustancias químicas que contienen los cigarrillos y cigarros pueden dañar los pulmones  Pida información a macedo médico si usted actualmente fuma y necesita ayuda para dejar de fumar  Los cigarrillos electrónicos o tabaco sin humo todavía contienen nicotina  Consulte con macedo médico antes de [employer]  · Use un humidificador de vapor frío  El humidificador le ayudará a aumentar la humedad del aire en macedo hogar   [place] podría ayudarle a respirar más fácilmente y al mismo tiempo disminuir la tos  · Mantenga mariscal Charlaine Shertone  Podría respirar mejor si se acuesta con la cabecera de mariscal cama para uguay  Evite paco neumonía viral:   · Evite la propagación de gérmenes  Lávese las kamala frecuentemente con agua y [place]  Use un gel desinfectante para las kamala cuando no hay jabón ni agua disponible  No se toque los ojos, la nariz o la boca a menos que se haya lavado las kamala gladys  Cúbrase la boca al toser  Tosa en un pañuelo desechable o en la manga de mariscal camisa para no contagiar los gérmenes con mega kamala  Si usted está enfermo, manténgase alejado de otras personas lo más que pueda  · [place] vacunas  Es posible que usted necesite recibir paco vacuna que sirve para prevenir la neumonía  Acuda a que le apliquen la vacuna de la influenza (gripe) cada año tan pronto elvin esté disponible  Acuda a mega consultas de control con mariscal médico según le indicaron  Anote mega preguntas para que se acuerde de hacerlas zev mega visitas  © 2017 2600 Sean  Information is for End User's use only and may not be sold, redistributed or otherwise used for commercial purposes  All illustrations and images included in CareNotes® are the copyrighted property of A D A M , Inc  or Primo Rust  Esta información es sólo para uso en educación  Mariscal intención no es darle un consejo médico sobre enfermedades o tratamientos  Colsulte con mariscal [place] farmacéutico antes de seguir cualquier régimen médico para saber si es seguro y efectivo para usted  Amlodipino (Por la boca)   Se [place] para tratar la presión arterial zach y la angina (dolor en el pecho)  Chioma medicamento es un agente bloqueador del canal de calcio  Purnima(s) : Norvasc   Existen muchas otras marcas de chioma medicamento  Chioma medicamento no debe ser usado cuando:   Chioma medicamento no es adecuado para todas las personas   No use chioma medicamento si alguna vez ha tenido paco reacción alérgica a la amlodipina  Forma de usar chioma medicamento:   [employer] para disolver  · [place] mega medicamentos elvin se le haya indicado  Es probable que sea necesario cambiar macedo dosis varias veces hasta encontrar la que funciona mejor para usted  [employer] a la misma hora todos los yulissa  · [place] y siga las instrucciones para el paciente que vienen con el medicamento  Hable con macedo médico o farmacéutico si tiene alguna pregunta  · Si olvida paco dosis: [place] la dosis tan pronto elvin lo recuerde  Si reis pasado más de 12 horas de la dosis que se supone que usted tome, omita la dosis [place] y tome la próxima dosis a la hora regular  · Guarde el medicamento en un recipiente cerrado a temperatura ambiente y alejado del calor, la humedad y la federica directa  Medicamentos y [employer] que debe evitar:   Consulte con macedo médico o farmacéutico antes de usar cualquier medicamento, incluyendo los que compra sin receta médica, las vitaminas y los productos herbales  · Algunos medicamentos pueden afectar la eficacia con que actúa la amlodipina  Informe a u médico si usted [employer] alguno de los siguientes medicamentos:   ¨ Claritromicina, ciclosporina, diltiazem, itraconazol, ritonavir, sildenafil, simvastatina, tacrolimús  Precauciones zev el uso de chioma medicamento:   · Informe a macedo médico si usted está embarazada o dando de lactar, o si padece de paco enfermedad del hígado, enfermedad del corazón, arterioesclerosis coronaria, o estenosis aórtica  · Chioma medicamento puede bajarle demasiado macedo presión arterial, especialmente cuando lo use por primera vez o si usted sufre paco deshidratación  Si se siente desvanecer o mareado póngase de pie o siéntese lentamente  · Macedo médico tendrá que revisar macedo progreso y los efectos de chioma medicamento zev mega citas regulares  [place] sin falta con todas mega citas médicas  Asista a todas mega citas    · Aunque se sienta mejor, no suspenda el uso de daniela medicamento sin antes consultar con mariscal médico  Daniela medicamento no le curará la presión arterial, tara sí le ayudará a mantenerla dentro de un rango normal  Es probable que necesite wali medicamento para la presión arterial por el anuj de mariscal asya  · Guarde todos los medicamentos fuera del alcance de los niños  Nunca comparta mega medicamentos con [employer]  Efectos secundarios que pueden presentarse zev el uso de daniela medicamento:   Consulte inmediatamente con el médico si nota cualquiera de estos efectos secundarios:  · Reacción alérgica: Comezón o ronchas, hinchazón del bert o las kamala, hinchazón u hormigueo en la boca o garganta, opresión en el pecho, dificultad para respirar  · Desvanecimientos, mareos  · Dolor en el pecho nuevo o que empeora  · Inflamación de mega kamala, tobillos o pies  · Dificultad para respirar, náuseas, sudoración inusual, desmayos  Consulte con el médico si nota otros efectos secundarios que randy son causados por daniela medicamento  Llame a mariscal médico para consultarle Dmtiriy Wright puede notificar mega efectos secundarios al [facility] al [phone]  © 2017 2600 Sean Whitney Information is for End User's use only and may not be sold, redistributed or otherwise used for commercial purposes  Esta información es sólo para uso en educación  Mariscal intención no es darle un consejo médico sobre enfermedades o tratamientos  Colsulte con mariscal Keshav Rand farmacéutico antes de seguir cualquier régimen médico para saber si es seguro y efectivo para usted

## 2019-12-12 NOTE — NURSING NOTE
IV removed with tip intact  Pressure held to control bleeding  Discharge instructions discussed with patient and verbalizes understanding  Patient left with all their belongings, prescriptions and discharge instructions  In wheel chair with CNA

## 2019-12-13 ENCOUNTER — TRANSITIONAL CARE MANAGEMENT (OUTPATIENT)
Dept: FAMILY MEDICINE CLINIC | Facility: CLINIC | Age: 59
End: 2019-12-13

## 2019-12-18 ENCOUNTER — OFFICE VISIT (OUTPATIENT)
Dept: FAMILY MEDICINE CLINIC | Facility: CLINIC | Age: 59
End: 2019-12-18

## 2019-12-18 VITALS
TEMPERATURE: 97.7 F | SYSTOLIC BLOOD PRESSURE: 138 MMHG | OXYGEN SATURATION: 98 % | RESPIRATION RATE: 16 BRPM | HEART RATE: 96 BPM | BODY MASS INDEX: 16.3 KG/M2 | WEIGHT: 92 LBS | DIASTOLIC BLOOD PRESSURE: 100 MMHG

## 2019-12-18 DIAGNOSIS — I10 ESSENTIAL HYPERTENSION: ICD-10-CM

## 2019-12-18 DIAGNOSIS — R05.9 COUGH: ICD-10-CM

## 2019-12-18 DIAGNOSIS — Z09 HOSPITAL DISCHARGE FOLLOW-UP: Primary | ICD-10-CM

## 2019-12-18 DIAGNOSIS — R63.0 POOR APPETITE: ICD-10-CM

## 2019-12-18 DIAGNOSIS — J12.1 PNEUMONIA DUE TO RESPIRATORY SYNCYTIAL VIRUS (RSV): ICD-10-CM

## 2019-12-18 PROCEDURE — 1111F DSCHRG MED/CURRENT MED MERGE: CPT | Performed by: PHYSICIAN ASSISTANT

## 2019-12-18 PROCEDURE — 99495 TRANSJ CARE MGMT MOD F2F 14D: CPT | Performed by: PHYSICIAN ASSISTANT

## 2019-12-18 RX ORDER — LOSARTAN POTASSIUM 100 MG/1
100 TABLET ORAL DAILY
Qty: 90 TABLET | Refills: 1 | Status: SHIPPED | OUTPATIENT
Start: 2019-12-18 | End: 2020-01-17 | Stop reason: SDUPTHER

## 2019-12-18 RX ORDER — AMLODIPINE BESYLATE 5 MG/1
5 TABLET ORAL DAILY
Qty: 90 TABLET | Refills: 1 | Status: SHIPPED | OUTPATIENT
Start: 2019-12-18 | End: 2020-01-17 | Stop reason: SDUPTHER

## 2019-12-18 RX ORDER — CYPROHEPTADINE HYDROCHLORIDE 4 MG/1
4 TABLET ORAL 2 TIMES DAILY
Qty: 60 TABLET | Refills: 2 | Status: SHIPPED | OUTPATIENT
Start: 2019-12-18 | End: 2020-01-17 | Stop reason: SDUPTHER

## 2019-12-18 RX ORDER — METOPROLOL SUCCINATE 100 MG/1
100 TABLET, EXTENDED RELEASE ORAL DAILY
Qty: 90 TABLET | Refills: 1 | Status: SHIPPED | OUTPATIENT
Start: 2019-12-18 | End: 2020-01-17 | Stop reason: SDUPTHER

## 2019-12-18 RX ORDER — BENZONATATE 100 MG/1
100 CAPSULE ORAL 3 TIMES DAILY PRN
Qty: 30 CAPSULE | Refills: 1 | Status: SHIPPED | OUTPATIENT
Start: 2019-12-18 | End: 2020-01-17 | Stop reason: SDUPTHER

## 2019-12-18 RX ORDER — GUAIFENESIN/DEXTROMETHORPHAN 100-10MG/5
10 SYRUP ORAL EVERY 4 HOURS PRN
Qty: 473 ML | Refills: 1 | Status: SHIPPED | OUTPATIENT
Start: 2019-12-18 | End: 2020-01-17 | Stop reason: SDUPTHER

## 2019-12-18 NOTE — PROGRESS NOTES
Transition of Care  Follow-up After Hospitalization    Chapo Lopez 64 y o  female   Date:  12/18/2019    TCM Call (since 11/18/2019)     Date and time call was made  12/13/2019  9:09 AM    Hospital care reviewed  Records reviewed    Patient was hospitialized at  [address] [place] [address]    Date of Admission  12/11/19    Date of discharge  12/12/19    Diagnosis  Pneumonia due to respiratory syncytial virus     Disposition  Home    Current Symptoms  Cough    Cough Severity  Mild      TCM Call (since 11/18/2019)     Post hospital issues  None    Should patient be enrolled in anticoag monitoring? No    Scheduled for follow up? Yes    Did you obtain your prescribed medications  Yes    Do you need help managing your prescriptions or medications  No    Is transportation to your appointment needed  No    I have advised the patient to call PCP with any new or worsening symptoms  [facility] records were reviewed  Medications upon discharge reviewed/updated  Discharge Disposition:  Home  Follow up visits with other specialists: None      HPI:  - Patient is a 47-year-old female who presents today for transition of care management  Patient is accompanied today by her  who provides [place]  Patient originally presented to [facility] on 12/11/2019 due to productive cough for 4 days  Patient had associated headache, weakness, and back pain due to the coughing  Patient had reported 5 lb weight loss in the past 2 weeks  In the emergency department, patient received morphine 2 mg and 1 L bolus of normal saline  EKG showed normal sinus rhythm with no ST segment abnormality  RSV PCR came back positive    CT scan chest abdomen pelvis with contrast shows suspected infectious or inflammatory bronchiolitis throughout both lungs as well as a small consolidation in the left lower lobe that may represent atelectasis or pneumonia  Patient was admitted for suspected RSV pneumonia and for symptomatic care since patient fell uncomfortable going home  Patient was admitted to be hydrated via IV and her cough was treated with Robitussin and Tessalon Perles  Throughout her stay, patient's symptoms slightly improved  Patient was informed that her pneumonia is due to a virus, so there is no need to prescribe antibiotics  Patient was discharged on 12/12/2019 to home  - Today, patient notes she still has a cough  Patient notes the Tessalon Perles and Robitussin DM were very helpful and she would like a refill  Patient notes she has been eating, but she continues to lose weight  Patient notes when she was living in [facility], she was given a medication called to cyproheptadine  Patient notes this medication increase her appetite and she was unable to eat more and to maintain a good healthy weight  Patient is currently drinking Ensure once a day, but she notes it does not help her to gain weight  ROS: Review of Systems   Constitutional: Negative for fatigue and fever  Poor appetite   HENT: Negative for congestion, ear pain, rhinorrhea and sore throat  Eyes: Negative for pain and visual disturbance  Respiratory: Positive for cough  Negative for chest tightness and shortness of breath  Cardiovascular: Negative for chest pain and palpitations  Gastrointestinal: Negative for abdominal pain, constipation, diarrhea, nausea and vomiting  Genitourinary: Negative for difficulty urinating and dysuria  Musculoskeletal: Negative for arthralgias  Skin: Negative for rash  Neurological: Negative for dizziness and headaches  Psychiatric/Behavioral: Negative for behavioral problems  The patient is not nervous/anxious          Past Medical History:   Diagnosis Date    Asthma     History of transfusion     Hypertension        Past Surgical History:   Procedure Laterality Date    BREAST BIOPSY Right 1999    benign   SECTION      SPINE SURGERY         Social History     Socioeconomic History    Marital status: Single     Spouse name: None    Number of children: None    Years of education: None    Highest education level: None   Occupational History    None   Social Needs    Financial resource strain: None    Food insecurity:     Worry: None     Inability: None    Transportation needs:     Medical: None     Non-medical: None   Tobacco Use    Smoking status: Never Smoker    Smokeless tobacco: Never Used   Substance and Sexual Activity    Alcohol use: Yes     Frequency: 2-4 times a month     Drinks per session: 1 or 2     Binge frequency: Never    Drug use: Never    Sexual activity: Yes     Partners: Male   Lifestyle    Physical activity:     Days per week: None     Minutes per session: None    Stress: None   Relationships    Social connections:     Talks on phone: None     Gets together: None     Attends Caoda[employer] service: None     Active member of club or organization: None     Attends meetings of clubs or organizations: None     Relationship status: None    Intimate partner violence:     Fear of current or ex partner: None     Emotionally abused: None     Physically abused: None     Forced sexual activity: None   Other Topics Concern    None   Social History Narrative    None       Family History   Problem Relation Age of Onset    Cancer Mother     Cancer Brother        Allergies   Allergen Reactions    Aspirin Swelling     Patient jaison toradol 15mg at ed    Penicillins Swelling         Current Outpatient Medications:     acetaminophen (TYLENOL) 650 mg CR tablet, Take 1 tablet (650 mg total) by mouth every 8 (eight) hours as needed for mild pain, Disp: 30 tablet, Rfl: 3    amLODIPine (NORVASC) 5 mg tablet, Take 1 tablet (5 mg total) by mouth daily, Disp: 90 tablet, Rfl: 1    benzonatate (TESSALON PERLES) 100 mg capsule, Take 1 capsule (100 mg total) by mouth 3 (three) times a day as needed for cough, Disp: 30 capsule, Rfl: 1    dextromethorphan-guaiFENesin (ROBITUSSIN DM)  mg/5 mL syrup, Take 10 mL by mouth every 4 (four) hours as needed for cough or congestion (cough first line), Disp: 473 mL, Rfl: 1    hydrocortisone 1 % ointment, Apply topically 2 (two) times a day, Disp: 30 g, Rfl: 3    losartan (COZAAR) 100 MG tablet, Take 1 tablet (100 mg total) by mouth daily, Disp: 90 tablet, Rfl: 1    metoprolol succinate (TOPROL-XL) 100 mg 24 hr tablet, Take 1 tablet (100 mg total) by mouth daily, Disp: 90 tablet, Rfl: 1    mineral oil-hydrophilic petrolatum (AQUAPHOR) ointment, Apply topically as needed for dry skin, Disp: 420 g, Rfl: 0    cyproheptadine (PERIACTIN) 4 mg tablet, Take 1 tablet (4 mg total) by mouth 2 (two) times a day, Disp: 60 tablet, Rfl: 2    Nutritional Supplements (ENSURE ORIGINAL) LIQD, Take 1 Bottle by mouth daily, Disp: 30 Bottle, Rfl: 2      Physical Exam:  /100   Pulse 96   Temp 97 7 °F (36 5 °C) (Temporal)   Resp 16   Wt 41 7 kg (92 lb)   SpO2 98%   BMI 16 30 kg/m²     Physical Exam   Constitutional: She is oriented to person, place, and time  She appears well-developed  No distress  Very thin female   HENT:   Head: Normocephalic and atraumatic  Right Ear: External ear normal    Left Ear: External ear normal    Nose: Nose normal    Mouth/Throat: Uvula is midline, oropharynx is clear and moist and mucous membranes are normal    Eyes: Pupils are equal, round, and reactive to light  Conjunctivae and EOM are normal    Neck: Normal range of motion  Neck supple  Cardiovascular: Normal rate, regular rhythm, normal heart sounds and intact distal pulses  No murmur heard  Pulmonary/Chest: Effort normal and breath sounds normal  She has no wheezes  Abdominal: Soft  Bowel sounds are normal  There is no tenderness  Neurological: She is alert and oriented to person, place, and time  Skin: Skin is warm and dry     Psychiatric: She has a normal mood and affect  Her speech is normal and behavior is normal    Nursing note and vitals reviewed  Labs:  Lab Results   Component Value Date    WBC 7 60 12/12/2019    HGB 12 3 12/12/2019    HCT 36 7 12/12/2019    MCV 85 12/12/2019     12/12/2019     Lab Results   Component Value Date    K 3 7 12/12/2019     12/12/2019    CO2 28 12/12/2019    BUN 7 12/12/2019    CREATININE 0 54 (L) 12/12/2019    GLUF 105 (H) 12/12/2019    CALCIUM 9 4 12/12/2019    AST 27 12/11/2019    ALT 20 12/11/2019    ALKPHOS 65 12/11/2019    EGFR 121 12/12/2019       Assessment and Plan:    Pneumonia due to RSV  - Improving, but patient persists with cough which she is aware could last for about a month or so  Will refill Robyoly CAEMJO and Tessalon Perles  Advised to be sure she stays well hydrated throughout the day  Hypertension  - Continue metoprolol 100 mg once daily, losartan 100 mg once daily, and amlodipine 5 mg once daily  - Currently blood pressure is controlled at this time  Reviewed BP target goal with patient  - Continue to maintain healthy balanced diet with focus on low salt intake  Limit alcohol intake  - Advised to exercise at least 30 minutes a day for at least 5 days out of the week  Poor Appetite/Underweight  - Patient was previously prescribed cyproheptadine while living in [facility]  She has been without the medication for about 1 5 years  Will prescribe cyproheptadine 4 mg, to be taken twice daily to stimulate appetite  Will follow up in 1 month  Ame Arvizu was seen today for transition of care management  Diagnoses and all orders for this visit:    Hospital discharge follow-up    Pneumonia due to respiratory syncytial virus (RSV)     Essential hypertension  -     metoprolol succinate (TOPROL-XL) 100 mg 24 hr tablet; Take 1 tablet (100 mg total) by mouth daily  -     losartan (COZAAR) 100 MG tablet;  Take 1 tablet (100 mg total) by mouth daily  -     amLODIPine (NORVASC) 5 mg tablet; Take 1 tablet (5 mg total) by mouth daily    Poor appetite  -     cyproheptadine (PERIACTIN) 4 mg tablet; Take 1 tablet (4 mg total) by mouth 2 (two) times a day    Cough  -     dextromethorphan-guaiFENesin (ROBITUSSIN DM)  mg/5 mL syrup; Take 10 mL by mouth every 4 (four) hours as needed for cough or congestion (cough first line)  -     benzonatate (TESSALON PERLES) 100 mg capsule; Take 1 capsule (100 mg total) by mouth 3 (three) times a day as needed for cough        All of patients questions were answered  Patient understands and agrees with the above plan       Tripp Foster PA-C  12/18/19  NATHALIE Chandler

## 2019-12-19 PROBLEM — R63.0 POOR APPETITE: Status: ACTIVE | Noted: 2019-12-19

## 2020-01-17 DIAGNOSIS — R05.9 COUGH: ICD-10-CM

## 2020-01-17 DIAGNOSIS — M79.604 PAIN OF RIGHT LOWER EXTREMITY: ICD-10-CM

## 2020-01-17 DIAGNOSIS — I10 ESSENTIAL HYPERTENSION: ICD-10-CM

## 2020-01-17 DIAGNOSIS — R63.0 POOR APPETITE: ICD-10-CM

## 2020-01-17 RX ORDER — BENZONATATE 100 MG/1
100 CAPSULE ORAL 3 TIMES DAILY PRN
Qty: 30 CAPSULE | Refills: 1 | Status: SHIPPED | OUTPATIENT
Start: 2020-01-17 | End: 2021-03-10 | Stop reason: ALTCHOICE

## 2020-01-17 RX ORDER — AMLODIPINE BESYLATE 5 MG/1
5 TABLET ORAL DAILY
Qty: 90 TABLET | Refills: 1 | Status: SHIPPED | OUTPATIENT
Start: 2020-01-17 | End: 2020-03-27 | Stop reason: SDUPTHER

## 2020-01-17 RX ORDER — LOSARTAN POTASSIUM 100 MG/1
100 TABLET ORAL DAILY
Qty: 90 TABLET | Refills: 1 | Status: SHIPPED | OUTPATIENT
Start: 2020-01-17 | End: 2020-03-27 | Stop reason: SDUPTHER

## 2020-01-17 RX ORDER — GUAIFENESIN/DEXTROMETHORPHAN 100-10MG/5
10 SYRUP ORAL EVERY 4 HOURS PRN
Qty: 473 ML | Refills: 1 | Status: SHIPPED | OUTPATIENT
Start: 2020-01-17 | End: 2021-03-10 | Stop reason: ALTCHOICE

## 2020-01-17 RX ORDER — CYPROHEPTADINE HYDROCHLORIDE 4 MG/1
4 TABLET ORAL 2 TIMES DAILY
Qty: 60 TABLET | Refills: 2 | Status: SHIPPED | OUTPATIENT
Start: 2020-01-17 | End: 2020-03-27 | Stop reason: SDUPTHER

## 2020-01-17 RX ORDER — METOPROLOL SUCCINATE 100 MG/1
100 TABLET, EXTENDED RELEASE ORAL DAILY
Qty: 90 TABLET | Refills: 1 | Status: SHIPPED | OUTPATIENT
Start: 2020-01-17 | End: 2020-03-27 | Stop reason: SDUPTHER

## 2020-03-23 ENCOUNTER — PATIENT OUTREACH (OUTPATIENT)
Dept: FAMILY MEDICINE CLINIC | Facility: CLINIC | Age: 60
End: 2020-03-23

## 2020-03-23 DIAGNOSIS — Z78.9 NEED FOR FOLLOW-UP BY SOCIAL WORKER: Primary | ICD-10-CM

## 2020-03-23 NOTE — PROGRESS NOTES
JONA GUPTA received a referral from clerical staff regarding patient's two consecutive missed appointments  JONA GUPTA contacted patient to assess for social barriers and discuss risk for discharge  JONA GUPTA spoke with patient's spouse: communicated in his native language, [place]  Patient's spouse denies social barriers at this time  States patient she has an appointment tomorrow and is able to attend this appointment  JONA GUPTA explained the discharge policy  Patient denies having additional concerns  Referral will be closed: JONA GUPTA will continue to remain available for additional assistance/support as needed

## 2020-03-24 NOTE — PROGRESS NOTES
Noted  Thank you! However, patient's appointment was canceled for today due to COVID-19  We will reschedule patient once everything settles down  Thanks!

## 2020-03-27 ENCOUNTER — TELEMEDICINE (OUTPATIENT)
Dept: FAMILY MEDICINE CLINIC | Facility: CLINIC | Age: 60
End: 2020-03-27

## 2020-03-27 VITALS — WEIGHT: 110 LBS | BODY MASS INDEX: 19.49 KG/M2

## 2020-03-27 DIAGNOSIS — R63.0 POOR APPETITE: ICD-10-CM

## 2020-03-27 DIAGNOSIS — R63.6 UNDERWEIGHT: ICD-10-CM

## 2020-03-27 DIAGNOSIS — Z13.31 DEPRESSION SCREEN: ICD-10-CM

## 2020-03-27 DIAGNOSIS — I10 ESSENTIAL HYPERTENSION: Primary | ICD-10-CM

## 2020-03-27 DIAGNOSIS — M79.604 PAIN OF RIGHT LOWER EXTREMITY: ICD-10-CM

## 2020-03-27 PROCEDURE — T1015 CLINIC SERVICE: HCPCS | Performed by: PHYSICIAN ASSISTANT

## 2020-03-27 PROCEDURE — G2012 BRIEF CHECK IN BY MD/QHP: HCPCS | Performed by: PHYSICIAN ASSISTANT

## 2020-03-27 RX ORDER — METOPROLOL SUCCINATE 100 MG/1
100 TABLET, EXTENDED RELEASE ORAL DAILY
Qty: 90 TABLET | Refills: 1 | Status: SHIPPED | OUTPATIENT
Start: 2020-03-27 | End: 2021-03-10 | Stop reason: SDUPTHER

## 2020-03-27 RX ORDER — CYPROHEPTADINE HYDROCHLORIDE 4 MG/1
4 TABLET ORAL 2 TIMES DAILY
Qty: 60 TABLET | Refills: 2 | Status: SHIPPED | OUTPATIENT
Start: 2020-03-27 | End: 2021-03-10 | Stop reason: SDUPTHER

## 2020-03-27 RX ORDER — AMLODIPINE BESYLATE 5 MG/1
5 TABLET ORAL DAILY
Qty: 90 TABLET | Refills: 1 | Status: SHIPPED | OUTPATIENT
Start: 2020-03-27 | End: 2020-12-01 | Stop reason: DRUGHIGH

## 2020-03-27 RX ORDER — LOSARTAN POTASSIUM 100 MG/1
100 TABLET ORAL DAILY
Qty: 90 TABLET | Refills: 1 | Status: SHIPPED | OUTPATIENT
Start: 2020-03-27 | End: 2021-03-10 | Stop reason: SDUPTHER

## 2020-03-27 RX ORDER — SENNOSIDES 8.6 MG
650 CAPSULE ORAL EVERY 8 HOURS PRN
Qty: 30 TABLET | Refills: 3 | Status: SHIPPED | OUTPATIENT
Start: 2020-03-27

## 2020-03-27 NOTE — PROGRESS NOTES
Virtual Regular Visit    Problem List Items Addressed This Visit        Cardiovascular and Mediastinum    Essential hypertension - Primary    Relevant Medications    amLODIPine (NORVASC) 5 mg tablet    losartan (COZAAR) 100 MG tablet    metoprolol succinate (TOPROL-XL) 100 mg 24 hr tablet       Other    Underweight    Pain of right lower extremity    Relevant Medications    acetaminophen (TYLENOL) 650 mg CR tablet    Poor appetite    Relevant Medications    cyproheptadine (PERIACTIN) 4 mg tablet      Other Visit Diagnoses     Depression screen              Hypertension  - Continue metoprolol 100 mg once daily, losartan 100 mg once daily, and amlodipine 5 mg once daily  - Continue to maintain healthy balanced diet with focus on low salt intake  Limit alcohol intake  - Advised to exercise at least 30 minutes a day for at least 5 days out of the week  Underweight/poor appetite  - Patient has gained 18 lb since last office visit in December 2019  Patient is very happy with her weight gain  Patient's BMI is currently 19 49 kg/m2 which is within normal BMI range  - Advised to continue taking cyproheptadine 4 mg twice daily  Continue drinking Ensure every other day  Will continue to monitor  Depression Screen  - Depression Screening Follow-up Plan: Patient's depression screening was positive with a PHQ-2 score of 0  Their PHQ-9 score was not indicated  Clinically patient does not have depression  No treatment is required  Reason for visit is   Follow-up of chronic conditions    Encounter provider Sonia Dugan PA-C    [facility] [address] [address] [phone]      Recent Visits  No visits were found meeting these conditions     Showing recent visits within past 7 days and meeting all other requirements     Today's Visits  Date Type Provider Dept   03/27/20 Telemedicine LALITO Lowery Showing today's visits and meeting all other requirements     Future Appointments  No visits were found meeting these conditions  Showing future appointments within next 150 days and meeting all other requirements        After connecting through [employer], the patient was identified by name and date of birth  Betina Yost was informed that this is a telemedicine visit and that the visit is being conducted through telephone which may not be secure and therefore, might not be HIPAA-compliant  My office door was closed  No one else was in the room  She acknowledged consent and understanding of privacy and security of the video platform  The patient has agreed to participate and understands they can discontinue the visit at any time  Subjective  Betina Yost is a 64 y o  female   With known past medical history hypertension, underweight who is seen today via telemedicine for follow-up of chronic conditions  - Overall, patient notes she is doing well and has no new complaints today  Hypertension: Currently taking metoprolol 100 mg once daily, losartan 100 mg once daily, and amlodipine 5 mg once daily  Patient denies any headaches, dizziness, chest pain, lower leg swelling  Underweight/poor appetite: Patient was started on cyproheptadine 4 mg twice daily at last office visit  Patient notes she has been taking it as prescribed and has been very helpful  Patient notes she was previously drinking Ensure 1 time a day, but now she has taken every other day  Patient notes now she is eating more solid foods  Patient notes she weighed herself this morning and she was now weighing 110 lb        PHQ-9 Depression Screening    PHQ-9:    Frequency of the following problems over the past two weeks:       Little interest or pleasure in doing things:  0 - not at all  Feeling down, depressed, or hopeless:  0 - not at all  PHQ-2 Score:  0           Past Medical History:   Diagnosis Date    Asthma     History of transfusion     Hypertension        Past Surgical History:   Procedure Laterality Date    BREAST BIOPSY Right     benign     SECTION      SPINE SURGERY         Current Outpatient Medications   Medication Sig Dispense Refill    acetaminophen (TYLENOL) 650 mg CR tablet Take 1 tablet (650 mg total) by mouth every 8 (eight) hours as needed for mild pain 30 tablet 3    amLODIPine (NORVASC) 5 mg tablet Take 1 tablet (5 mg total) by mouth daily 90 tablet 1    benzonatate (TESSALON PERLES) 100 mg capsule Take 1 capsule (100 mg total) by mouth 3 (three) times a day as needed for cough 30 capsule 1    cyproheptadine (PERIACTIN) 4 mg tablet Take 1 tablet (4 mg total) by mouth 2 (two) times a day 60 tablet 2    dextromethorphan-guaiFENesin (ROBITUSSIN DM)  mg/5 mL syrup Take 10 mL by mouth every 4 (four) hours as needed for cough or congestion (cough first line) 473 mL 1    hydrocortisone 1 % ointment Apply topically 2 (two) times a day 30 g 3    losartan (COZAAR) 100 MG tablet Take 1 tablet (100 mg total) by mouth daily 90 tablet 1    metoprolol succinate (TOPROL-XL) 100 mg 24 hr tablet Take 1 tablet (100 mg total) by mouth daily 90 tablet 1    mineral oil-hydrophilic petrolatum (AQUAPHOR) ointment Apply topically as needed for dry skin 420 g 0    Nutritional Supplements (ENSURE ORIGINAL) LIQD Take 1 Bottle by mouth daily 30 Bottle 2     No current facility-administered medications for this visit  Allergies   Allergen Reactions    Aspirin Swelling     Patient jaison toradol 15mg at ed    Penicillins Swelling       Review of Systems   Constitutional: Positive for appetite change (Increased (Back to normal))  Negative for fatigue and fever  HENT: Negative for congestion, ear pain, rhinorrhea and sore throat  Eyes: Negative for pain and visual disturbance  Respiratory: Negative for cough, chest tightness and shortness of breath      Cardiovascular: Negative for chest pain, palpitations and leg swelling  Gastrointestinal: Negative for abdominal pain, constipation, diarrhea, nausea and vomiting  Genitourinary: Negative for difficulty urinating and dysuria  Musculoskeletal: Negative for arthralgias  Skin: Negative for rash  Neurological: Negative for dizziness and headaches  Psychiatric/Behavioral: Negative for behavioral problems  The patient is not nervous/anxious  Vitals:    03/27/20 1132   Weight: 49 9 kg (110 lb)       I spent 12 minutes with the patient during this visit  I would bill this visit as [id number] if patient was seen in-person  - Utilized [employer] for translation

## 2020-07-23 ENCOUNTER — HOSPITAL ENCOUNTER (EMERGENCY)
Facility: HOSPITAL | Age: 60
Discharge: HOME | End: 2020-07-23
Attending: EMERGENCY MEDICINE | Admitting: EMERGENCY MEDICINE
Payer: COMMERCIAL

## 2020-07-23 ENCOUNTER — APPOINTMENT (EMERGENCY)
Dept: RADIOLOGY | Facility: HOSPITAL | Age: 60
End: 2020-07-23
Payer: COMMERCIAL

## 2020-07-23 VITALS
WEIGHT: 111.77 LBS | HEART RATE: 72 BPM | TEMPERATURE: 98.4 F | SYSTOLIC BLOOD PRESSURE: 173 MMHG | OXYGEN SATURATION: 96 % | BODY MASS INDEX: 19.8 KG/M2 | RESPIRATION RATE: 14 BRPM | DIASTOLIC BLOOD PRESSURE: 96 MMHG

## 2020-07-23 DIAGNOSIS — I10 HYPERTENSION: ICD-10-CM

## 2020-07-23 DIAGNOSIS — Z77.098 CHEMICAL EXPOSURE: ICD-10-CM

## 2020-07-23 DIAGNOSIS — R06.00 DYSPNEA: Primary | ICD-10-CM

## 2020-07-23 LAB
ALBUMIN SERPL BCP-MCNC: 5.1 G/DL (ref 3–5.2)
ALP SERPL-CCNC: 71 U/L (ref 43–122)
ALT SERPL W P-5'-P-CCNC: 25 U/L (ref 9–52)
ANION GAP SERPL CALCULATED.3IONS-SCNC: 8 MMOL/L (ref 5–14)
AST SERPL W P-5'-P-CCNC: 34 U/L (ref 14–36)
BASOPHILS # BLD AUTO: 0.1 THOUSANDS/ΜL (ref 0–0.1)
BASOPHILS NFR BLD AUTO: 1 % (ref 0–1)
BILIRUB SERPL-MCNC: 0.9 MG/DL
BUN SERPL-MCNC: 8 MG/DL (ref 5–25)
CALCIUM SERPL-MCNC: 10 MG/DL (ref 8.4–10.2)
CHLORIDE SERPL-SCNC: 103 MMOL/L (ref 97–108)
CO2 SERPL-SCNC: 30 MMOL/L (ref 22–30)
CREAT SERPL-MCNC: 0.66 MG/DL (ref 0.6–1.2)
EOSINOPHIL # BLD AUTO: 0.3 THOUSAND/ΜL (ref 0–0.4)
EOSINOPHIL NFR BLD AUTO: 5 % (ref 0–6)
ERYTHROCYTE [DISTWIDTH] IN BLOOD BY AUTOMATED COUNT: 13.4 %
GFR SERPL CREATININE-BSD FRML MDRD: 98 ML/MIN/1.73SQ M
GLUCOSE SERPL-MCNC: 154 MG/DL (ref 70–99)
HCT VFR BLD AUTO: 41.6 % (ref 36–46)
HGB BLD-MCNC: 14 G/DL (ref 12–16)
LYMPHOCYTES # BLD AUTO: 2.2 THOUSANDS/ΜL (ref 0.5–4)
LYMPHOCYTES NFR BLD AUTO: 36 % (ref 25–45)
MCH RBC QN AUTO: 28.6 PG (ref 26–34)
MCHC RBC AUTO-ENTMCNC: 33.7 G/DL (ref 31–36)
MCV RBC AUTO: 85 FL (ref 80–100)
MONOCYTES # BLD AUTO: 0.5 THOUSAND/ΜL (ref 0.2–0.9)
MONOCYTES NFR BLD AUTO: 8 % (ref 1–10)
NEUTROPHILS # BLD AUTO: 3.1 THOUSANDS/ΜL (ref 1.8–7.8)
NEUTS SEG NFR BLD AUTO: 50 % (ref 45–65)
NT-PROBNP SERPL-MCNC: 167 PG/ML (ref 0–299)
PLATELET # BLD AUTO: 407 THOUSANDS/UL (ref 150–450)
PMV BLD AUTO: 8.3 FL (ref 8.9–12.7)
POTASSIUM SERPL-SCNC: 3.6 MMOL/L (ref 3.6–5)
PROT SERPL-MCNC: 9.1 G/DL (ref 5.9–8.4)
RBC # BLD AUTO: 4.91 MILLION/UL (ref 4–5.2)
SARS-COV-2 RNA RESP QL NAA+PROBE: NEGATIVE
SODIUM SERPL-SCNC: 141 MMOL/L (ref 137–147)
TROPONIN I SERPL-MCNC: <0.01 NG/ML (ref 0–0.03)
WBC # BLD AUTO: 6.2 THOUSAND/UL (ref 4.5–11)

## 2020-07-23 PROCEDURE — 36415 COLL VENOUS BLD VENIPUNCTURE: CPT | Performed by: EMERGENCY MEDICINE

## 2020-07-23 PROCEDURE — 96360 HYDRATION IV INFUSION INIT: CPT

## 2020-07-23 PROCEDURE — 96374 THER/PROPH/DIAG INJ IV PUSH: CPT

## 2020-07-23 PROCEDURE — 71045 X-RAY EXAM CHEST 1 VIEW: CPT

## 2020-07-23 PROCEDURE — 83880 ASSAY OF NATRIURETIC PEPTIDE: CPT | Performed by: EMERGENCY MEDICINE

## 2020-07-23 PROCEDURE — 93005 ELECTROCARDIOGRAM TRACING: CPT

## 2020-07-23 PROCEDURE — 85025 COMPLETE CBC W/AUTO DIFF WBC: CPT | Performed by: EMERGENCY MEDICINE

## 2020-07-23 PROCEDURE — 87635 SARS-COV-2 COVID-19 AMP PRB: CPT | Performed by: EMERGENCY MEDICINE

## 2020-07-23 PROCEDURE — 99285 EMERGENCY DEPT VISIT HI MDM: CPT | Performed by: EMERGENCY MEDICINE

## 2020-07-23 PROCEDURE — 99285 EMERGENCY DEPT VISIT HI MDM: CPT

## 2020-07-23 PROCEDURE — 94640 AIRWAY INHALATION TREATMENT: CPT

## 2020-07-23 PROCEDURE — 84484 ASSAY OF TROPONIN QUANT: CPT | Performed by: EMERGENCY MEDICINE

## 2020-07-23 PROCEDURE — 80053 COMPREHEN METABOLIC PANEL: CPT | Performed by: EMERGENCY MEDICINE

## 2020-07-23 RX ORDER — ALBUTEROL SULFATE 90 UG/1
2 AEROSOL, METERED RESPIRATORY (INHALATION) ONCE
Status: COMPLETED | OUTPATIENT
Start: 2020-07-23 | End: 2020-07-23

## 2020-07-23 RX ORDER — IPRATROPIUM BROMIDE AND ALBUTEROL SULFATE 2.5; .5 MG/3ML; MG/3ML
3 SOLUTION RESPIRATORY (INHALATION)
Status: DISCONTINUED | OUTPATIENT
Start: 2020-07-23 | End: 2020-07-23 | Stop reason: HOSPADM

## 2020-07-23 RX ORDER — METHYLPREDNISOLONE SODIUM SUCCINATE 125 MG/2ML
80 INJECTION, POWDER, LYOPHILIZED, FOR SOLUTION INTRAMUSCULAR; INTRAVENOUS ONCE
Status: COMPLETED | OUTPATIENT
Start: 2020-07-23 | End: 2020-07-23

## 2020-07-23 RX ORDER — ALBUTEROL SULFATE 90 UG/1
2 AEROSOL, METERED RESPIRATORY (INHALATION) EVERY 4 HOURS PRN
Qty: 1 INHALER | Refills: 0 | Status: SHIPPED | OUTPATIENT
Start: 2020-07-23 | End: 2021-03-10 | Stop reason: ALTCHOICE

## 2020-07-23 RX ADMIN — IPRATROPIUM BROMIDE AND ALBUTEROL SULFATE 3 ML: 2.5; .5 SOLUTION RESPIRATORY (INHALATION) at 14:34

## 2020-07-23 RX ADMIN — METHYLPREDNISOLONE SODIUM SUCCINATE 80 MG: 125 INJECTION, POWDER, FOR SOLUTION INTRAMUSCULAR; INTRAVENOUS at 14:29

## 2020-07-23 RX ADMIN — ALBUTEROL SULFATE 2 PUFF: 90 AEROSOL, METERED RESPIRATORY (INHALATION) at 16:18

## 2020-07-23 RX ADMIN — SODIUM CHLORIDE 1000 ML: 0.9 INJECTION, SOLUTION INTRAVENOUS at 14:31

## 2020-07-23 NOTE — ED NOTES
Patient verbalized understanding of discharge instructions and demonstrated teach back of new medications      Hoang Brown RN  07/23/20 5810

## 2020-07-23 NOTE — ED PROVIDER NOTES
History  Chief Complaint   Patient presents with    Shortness of Breath     pt feeling SOB after mixing bleach and cleaning supplies about an hr  PTA     Patient is a 51-year-old female who presents to the emergency room for complaints of shortness of breath  Patient is Hebrew-speaking only  Offered  phone and elected to use Hebrew-speaking staff  Patient and family member state that the patient was mixing bleach and laundry detergent in a bucket to create a cleaning solution she has used many times at home  States that she inhaled some of it by accident, and has over the last hour had progressively worsening short of breath  States that is not productive any sputum  Denies fevers chills recent travel, sick contacts  States they have been isolating at home during the 4 months of the coronavirus pandemic  History provided by:  Patient   used: Yes    Shortness of Breath   Severity:  Moderate  Associated symptoms: cough    Associated symptoms: no abdominal pain, no chest pain, no fever, no neck pain, no rash, no sore throat, no vomiting and no wheezing        Prior to Admission Medications   Prescriptions Last Dose Informant Patient Reported? Taking?    Nutritional Supplements (ENSURE ORIGINAL) LIQD   No No   Sig: Take 1 Bottle by mouth daily   acetaminophen (TYLENOL) 650 mg CR tablet   No No   Sig: Take 1 tablet (650 mg total) by mouth every 8 (eight) hours as needed for mild pain   amLODIPine (NORVASC) 5 mg tablet   No No   Sig: Take 1 tablet (5 mg total) by mouth daily   benzonatate (TESSALON PERLES) 100 mg capsule   No No   Sig: Take 1 capsule (100 mg total) by mouth 3 (three) times a day as needed for cough   cyproheptadine (PERIACTIN) 4 mg tablet   No No   Sig: Take 1 tablet (4 mg total) by mouth 2 (two) times a day   dextromethorphan-guaiFENesin (ROBITUSSIN DM)  mg/5 mL syrup   No No   Sig: Take 10 mL by mouth every 4 (four) hours as needed for cough or congestion (cough first line)   hydrocortisone 1 % ointment   No No   Sig: Apply topically 2 (two) times a day   losartan (COZAAR) 100 MG tablet   No No   Sig: Take 1 tablet (100 mg total) by mouth daily   metoprolol succinate (TOPROL-XL) 100 mg 24 hr tablet   No No   Sig: Take 1 tablet (100 mg total) by mouth daily   mineral oil-hydrophilic petrolatum (AQUAPHOR) ointment   No No   Sig: Apply topically as needed for dry skin      Facility-Administered Medications: None       Past Medical History:   Diagnosis Date    Asthma     History of transfusion     Hypertension        Past Surgical History:   Procedure Laterality Date    BREAST BIOPSY Right     benign     SECTION      SPINE SURGERY         Family History   Problem Relation Age of Onset    Cancer Mother     Cancer Brother      I have reviewed and agree with the history as documented  E-Cigarette/Vaping     E-Cigarette/Vaping Substances     Social History     Tobacco Use    Smoking status: Never Smoker    Smokeless tobacco: Never Used   Substance Use Topics    Alcohol use: Yes     Frequency: 2-4 times a month     Drinks per session: 1 or 2     Binge frequency: Never    Drug use: Never       Review of Systems   Constitutional: Negative  Negative for chills and fever  HENT: Negative  Negative for rhinorrhea, sore throat, trouble swallowing and voice change  Eyes: Negative  Negative for pain and visual disturbance  Respiratory: Positive for cough and shortness of breath  Negative for wheezing  Cardiovascular: Negative  Negative for chest pain and palpitations  Gastrointestinal: Negative for abdominal pain, diarrhea, nausea and vomiting  Genitourinary: Negative  Negative for dysuria and frequency  Musculoskeletal: Negative  Negative for neck pain and neck stiffness  Skin: Negative  Negative for rash  Neurological: Negative  Negative for dizziness, speech difficulty, weakness, light-headedness and numbness  Physical Exam  Physical Exam   Constitutional: She is oriented to person, place, and time  She appears well-developed and well-nourished  No distress  HENT:   Head: Normocephalic and atraumatic  Mouth/Throat: Oropharynx is clear and moist    Eyes: Pupils are equal, round, and reactive to light  Conjunctivae and EOM are normal    Neck: Normal range of motion  Neck supple  No tracheal deviation present  Cardiovascular: Normal rate, regular rhythm and intact distal pulses  Pulmonary/Chest: Accessory muscle usage present  Tachypnea noted  No respiratory distress  She has wheezes in the right upper field, the right lower field, the left upper field and the left lower field  She has no rales  Abdominal: Soft  Bowel sounds are normal  She exhibits no distension  There is no tenderness  There is no rebound and no guarding  Musculoskeletal: Normal range of motion  She exhibits no tenderness or deformity  Neurological: She is alert and oriented to person, place, and time  Skin: Skin is warm and dry  Capillary refill takes less than 2 seconds  No rash noted  Psychiatric: She has a normal mood and affect  Her behavior is normal    Nursing note and vitals reviewed        Vital Signs  ED Triage Vitals [07/23/20 1418]   Temperature Pulse Respirations Blood Pressure SpO2   98 4 °F (36 9 °C) 97 22 (!) 219/129 99 %      Temp Source Heart Rate Source Patient Position - Orthostatic VS BP Location FiO2 (%)   Tympanic Monitor Sitting Left arm --      Pain Score       --           Vitals:    07/23/20 1418 07/23/20 1530 07/23/20 1602   BP: (!) 219/129 (!) 173/96    Pulse: 97 85 72   Patient Position - Orthostatic VS: Sitting Sitting          Visual Acuity      ED Medications  Medications   sodium chloride 0 9 % bolus 1,000 mL (0 mL Intravenous Stopped 7/23/20 1559)   methylPREDNISolone sodium succinate (Solu-MEDROL) injection 80 mg (80 mg Intravenous Given 7/23/20 1429)   albuterol (PROVENTIL HFA,VENTOLIN HFA) inhaler 2 puff (2 puffs Inhalation Given 7/23/20 1618)       Diagnostic Studies  Results Reviewed     Procedure Component Value Units Date/Time    Novel Coronavirus Anirudh Frye [[id number]]  (Normal) Collected:  07/23/20 1426    Lab Status:  Final result Specimen:  Nares from Nose Updated:  07/23/20 1543     SARS-CoV-2 Negative    Narrative: The specimen collection materials, transport medium, and/or testing methodology utilized in the production of these test results have been proven to be reliable in a limited validation with an abbreviated program under the Emergency Utilization Authorization provided by the FDA  Testing reported as "Presumptive positive" will be confirmed with secondary testing with a reference laboratory to ensure result accuracy  Clinical caution and judgement should be used with the interpretation of these results with consideration of the clinical impression and other laboratory testing  Testing reported as "Positive" or "Negative" has been proven to be accurate according to standard laboratory validation requirements  All testing is performed with control materials showing appropriate reactivity at standard intervals        NT-BNP PRO [[id number]]  (Normal) Collected:  07/23/20 1426    Lab Status:  Final result Specimen:  Blood from Arm, Right Updated:  07/23/20 1502     NT-proBNP 167 pg/mL     Comprehensive metabolic panel [[id number]]  (Abnormal) Collected:  07/23/20 1426    Lab Status:  Final result Specimen:  Blood from Arm, Right Updated:  07/23/20 1502     Sodium 141 mmol/L      Potassium 3 6 mmol/L      Chloride 103 mmol/L      CO2 30 mmol/L      ANION GAP 8 mmol/L      BUN 8 mg/dL      Creatinine 0 66 mg/dL      Glucose 154 mg/dL      Calcium 10 0 mg/dL      AST 34 U/L      ALT 25 U/L      Alkaline Phosphatase 71 U/L      Total Protein 9 1 g/dL      Albumin 5 1 g/dL      Total Bilirubin 0 90 mg/dL      eGFR 98 ml/min/1 73sq m     Narrative:       National Kidney Disease Foundation guidelines for Chronic Kidney Disease (CKD):     Stage 1 with normal or high GFR (GFR > 90 mL/min/1 73 square meters)    Stage 2 Mild CKD (GFR = 60-89 mL/min/1 73 square meters)    Stage 3A Moderate CKD (GFR = 45-59 mL/min/1 73 square meters)    Stage 3B Moderate CKD (GFR = 30-44 mL/min/1 73 square meters)    Stage 4 Severe CKD (GFR = 15-29 mL/min/1 73 square meters)    Stage 5 End Stage CKD (GFR <15 mL/min/1 73 square meters)  Note: GFR calculation is accurate only with a steady state creatinine    Troponin I [[id number]]  (Normal) Collected:  20 142    Lab Status:  Final result Specimen:  Blood from Arm, Right Updated:  20 1502     Troponin I <0 01 ng/mL     CBC and differential [[id number]]  (Abnormal) Collected:  20    Lab Status:  Final result Specimen:  Blood from Arm, Right Updated:  20 1439     WBC 6 20 Thousand/uL      RBC 4 91 Million/uL      Hemoglobin 14 0 g/dL      Hematocrit 41 6 %      MCV 85 fL      MCH 28 6 pg      MCHC 33 7 g/dL      RDW 13 4 %      MPV 8 3 fL      Platelets 178 Thousands/uL      Neutrophils Relative 50 %      Lymphocytes Relative 36 %      Monocytes Relative 8 %      Eosinophils Relative 5 %      Basophils Relative 1 %      Neutrophils Absolute 3 10 Thousands/µL      Lymphocytes Absolute 2 20 Thousands/µL      Monocytes Absolute 0 50 Thousand/µL      Eosinophils Absolute 0 30 Thousand/µL      Basophils Absolute 0 10 Thousands/µL                  XR chest 1 view portable   Final Result by Daphne Reyes MD ( [id number])      No acute cardiopulmonary disease              Workstation performed: [id number]                    Procedures  Procedures         ED Course  ED Course as of 2209   Thu 2020   1437 Procedure Note: EKG  Date/Time: 20 2:38 PM   Performed by: Lilibeth   Authorized by: Lilibeth   ECG interpreted by me, the ED Provider: yes   The EKG demonstrates:  Rate 76  Rhythm sinus  QTc 438  No ST elevations/depressions              US AUDIT      Most Recent Value   Initial Alcohol Screen: US AUDIT-C    1  How often do you have a drink containing alcohol?  0 Filed at: 07/23/2020 1419   2  How many drinks containing alcohol do you have on a typical day you are drinking? 0 Filed at: 07/23/2020 1419   3a  Male UNDER 65: How often do you have five or more drinks on one occasion? 0 Filed at: 07/23/2020 1419   3b  FEMALE Any Age, or MALE 65+: How often do you have 4 or more drinks on one occassion? 0 Filed at: 07/23/2020 1419   Audit-C Score  0 Filed at: 07/23/2020 1419                  ESTEE/DAST-10      Most Recent Value   How many times in the past year have you    Used an illegal drug or used a prescription medication for non-medical reasons? Never Filed at: 07/23/2020 1419                                MDM  Number of Diagnoses or Management Options  Chemical exposure:   Dyspnea:   Hypertension:   Diagnosis management comments: Patient symptoms improved  Meds refilled  Ambulated without difficulty in ED  Patient aware of need for follow up care and strict return precautions were discussed  I have reviewed any of the patient's vist and any testing done in the emergency department  They have verbalized their understanding of any testing done today and have no further questions or concerns regarding their care in the emergency room  They will follow up with their primary care physician as well as with any specialist in their discharge instructions  Strict return precautions were discussed         Amount and/or Complexity of Data Reviewed  Clinical lab tests: ordered and reviewed  Tests in the radiology section of CPT®: ordered and reviewed  Tests in the medicine section of CPT®: ordered and reviewed  Independent visualization of images, tracings, or specimens: yes          Disposition  Final diagnoses:   Dyspnea   Hypertension   Chemical exposure     Time reflects when diagnosis was documented in both MDM as applicable and the Disposition within this note     Time User Action Codes Description Comment    7/23/2020  4:02 PM Isaías Crews Add [R06 00] Dyspnea     7/23/2020  4:02 PM Isaías Crews Add [I10] Hypertension     7/23/2020  4:04 PM Isaías Crews Add [V65 885] Chemical exposure       ED Disposition     ED Disposition Condition Date/Time Comment    Discharge Stable Thu Jul 23, 2020  4:01 PM Kevin Good discharge to home/self care              Follow-up Information     Follow up With Specialties Details Why [address] Stephani Villarreal PA-C Family Medicine Schedule an appointment as soon as possible for a visit   [address]  [address] [facility]  [address]  [phone]  [facility] Emergency Department Emergency Medicine  If symptoms worsen [address] [address] [phone]          Discharge Medication List as of 7/23/2020  4:05 PM      START taking these medications    Details   albuterol (PROVENTIL HFA,VENTOLIN HFA) 90 mcg/act inhaler Inhale 2 puffs every 4 (four) hours as needed for wheezing, Starting Thu 7/23/2020, Print         CONTINUE these medications which have NOT CHANGED    Details   acetaminophen (TYLENOL) 650 mg CR tablet Take 1 tablet (650 mg total) by mouth every 8 (eight) hours as needed for mild pain, Starting Fri 3/27/2020, Normal      amLODIPine (NORVASC) 5 mg tablet Take 1 tablet (5 mg total) by mouth daily, Starting Fri 3/27/2020, Normal      benzonatate (TESSALON PERLES) 100 mg capsule Take 1 capsule (100 mg total) by mouth 3 (three) times a day as needed for cough, Starting Fri 1/17/2020, Normal      cyproheptadine (PERIACTIN) 4 mg tablet Take 1 tablet (4 mg total) by mouth 2 (two) times a day, Starting Fri 3/27/2020, Normal      dextromethorphan-guaiFENesin (ROBITUSSIN DM)  mg/5 mL syrup Take 10 mL by mouth every 4 (four) hours as needed for cough or congestion (cough first line), Starting Fri 1/17/2020, Normal      hydrocortisone 1 % ointment Apply topically 2 (two) times a day, Starting Mon 8/5/2019, Normal      losartan (COZAAR) 100 MG tablet Take 1 tablet (100 mg total) by mouth daily, Starting Fri 3/27/2020, Normal      metoprolol succinate (TOPROL-XL) 100 mg 24 hr tablet Take 1 tablet (100 mg total) by mouth daily, Starting Fri 3/27/2020, Normal      mineral oil-hydrophilic petrolatum (AQUAPHOR) ointment Apply topically as needed for dry skin, Starting Mon 11/25/2019, Normal      Nutritional Supplements (ENSURE ORIGINAL) LIQD Take 1 Bottle by mouth daily, Starting Mon 8/5/2019, Normal           No discharge procedures on file      PDMP Review     None          ED Provider  Electronically Signed by           Fausto Mcfadden DO  07/24/20 9383

## 2020-07-23 NOTE — ED CARE HANDOFF
Emergency Department Sign Out Note        Sign out and transfer of care from Dr Fer Andrews  See Separate Emergency Department note  The patient, Kevin Hartley, was evaluated by the previous provider for SOB  Workup Completed:  Labs, PE    ED Course / Workup Pending (followup):  F/u labs, CXR            On re-evaluation the patient's oxygen saturation remained stable in the upper 90s  There are only a few scattered wheezes with no crackles or other respiratory findings  Patient states that she is feeling better and is insistent that she return home at this time  She was instructed on follow-up along with strict return precautions  ED Course as of Jul 23 1605   Thu Jul 23, 2020   1600 Patient reassessed  Patient states that she is feeling much better and on initial evaluation was sleeping comfortably  Vital signs are stable and she has been ambulatory to the restroom  Labs and chest x-ray unremarkable  Patient is aware of the importance of avoiding bleach in the fashion used earlier today, need for follow-up, and reasons return to the ER  Procedures  MDM    Disposition  Final diagnoses:   Dyspnea   Hypertension   Chemical exposure     Time reflects when diagnosis was documented in both MDM as applicable and the Disposition within this note     Time User Action Codes Description Comment    7/23/2020  4:02 PM Isaías Crews Add [R06 00] Dyspnea     7/23/2020  4:02 PM Isaías Crews Add [I10] Hypertension     7/23/2020  4:04 PM Isaías Crews Add [B01 619] Chemical exposure       ED Disposition     ED Disposition Condition Date/Time Comment    Discharge Stable Thu Jul 23, 2020  4:01 PM Kevin Hartley discharge to home/self care              Follow-up Information     Follow up With Specialties Details Why Contact Info    Mary Lou Carlos PA-C Family Medicine Schedule an appointment as soon as possible for a visit   [address]  [address] [facility] Emergency Department Emergency Medicine  If symptoms worsen [address] [address] [phone]        Patient's Medications   Discharge Prescriptions    ALBUTEROL (PROVENTIL HFA,VENTOLIN HFA) 90 MCG/ACT INHALER    Inhale 2 puffs every 4 (four) hours as needed for wheezing       Start Date: 7/23/2020 End Date: --       Order Dose: 2 puffs       Quantity: 1 Inhaler    Refills: 0     No discharge procedures on file         ED Provider  Electronically Signed by     Shantanu Gamboa,   07/23/20 6664

## 2020-07-23 NOTE — ED NOTES
Patient was mixing some bleach and laundry detergent today when she accidentally inhaled some of the fumes and started having a coughing fit and some SOB, patient also complains of some redness and irritation on her chest and should area  Patients chest and shoulder to appear to be red   Patient has a dry cough and bilateral expiratory wheezing in her lungs      Paula Garcia RN  07/23/20 4112

## 2020-07-24 LAB
ATRIAL RATE: 76 BPM
P AXIS: 79 DEGREES
PR INTERVAL: 154 MS
QRS AXIS: 26 DEGREES
QRSD INTERVAL: 84 MS
QT INTERVAL: 390 MS
QTC INTERVAL: 438 MS
T WAVE AXIS: 75 DEGREES
VENTRICULAR RATE: 76 BPM

## 2020-07-24 PROCEDURE — 93010 ELECTROCARDIOGRAM REPORT: CPT | Performed by: INTERNAL MEDICINE

## 2020-12-01 ENCOUNTER — OFFICE VISIT (OUTPATIENT)
Dept: FAMILY MEDICINE CLINIC | Facility: CLINIC | Age: 60
End: 2020-12-01

## 2020-12-01 VITALS
HEIGHT: 61 IN | WEIGHT: 107 LBS | HEART RATE: 68 BPM | OXYGEN SATURATION: 95 % | RESPIRATION RATE: 18 BRPM | BODY MASS INDEX: 20.2 KG/M2 | SYSTOLIC BLOOD PRESSURE: 150 MMHG | DIASTOLIC BLOOD PRESSURE: 98 MMHG | TEMPERATURE: 98.1 F

## 2020-12-01 DIAGNOSIS — G62.9 NEUROPATHY: Primary | ICD-10-CM

## 2020-12-01 DIAGNOSIS — R63.0 POOR APPETITE: ICD-10-CM

## 2020-12-01 DIAGNOSIS — I10 ESSENTIAL HYPERTENSION: ICD-10-CM

## 2020-12-01 DIAGNOSIS — R63.6 UNDERWEIGHT: ICD-10-CM

## 2020-12-01 PROCEDURE — 1036F TOBACCO NON-USER: CPT | Performed by: PHYSICIAN ASSISTANT

## 2020-12-01 PROCEDURE — 3008F BODY MASS INDEX DOCD: CPT | Performed by: PHYSICIAN ASSISTANT

## 2020-12-01 PROCEDURE — 99214 OFFICE O/P EST MOD 30 MIN: CPT | Performed by: PHYSICIAN ASSISTANT

## 2020-12-01 PROCEDURE — 3077F SYST BP >= 140 MM HG: CPT | Performed by: PHYSICIAN ASSISTANT

## 2020-12-01 PROCEDURE — 3080F DIAST BP >= 90 MM HG: CPT | Performed by: PHYSICIAN ASSISTANT

## 2020-12-01 RX ORDER — GABAPENTIN 100 MG/1
100 CAPSULE ORAL
Qty: 90 CAPSULE | Refills: 0 | Status: SHIPPED | OUTPATIENT
Start: 2020-12-01 | End: 2021-03-10 | Stop reason: DRUGHIGH

## 2020-12-01 RX ORDER — AMLODIPINE BESYLATE 10 MG/1
10 TABLET ORAL DAILY
Qty: 90 TABLET | Refills: 0 | Status: SHIPPED | OUTPATIENT
Start: 2020-12-01 | End: 2021-03-10 | Stop reason: SDUPTHER

## 2021-03-10 ENCOUNTER — OFFICE VISIT (OUTPATIENT)
Dept: FAMILY MEDICINE CLINIC | Facility: CLINIC | Age: 61
End: 2021-03-10

## 2021-03-10 VITALS
SYSTOLIC BLOOD PRESSURE: 132 MMHG | HEART RATE: 86 BPM | TEMPERATURE: 97.6 F | BODY MASS INDEX: 20.77 KG/M2 | RESPIRATION RATE: 18 BRPM | WEIGHT: 110 LBS | DIASTOLIC BLOOD PRESSURE: 88 MMHG | HEIGHT: 61 IN | OXYGEN SATURATION: 98 %

## 2021-03-10 DIAGNOSIS — I10 ESSENTIAL HYPERTENSION: ICD-10-CM

## 2021-03-10 DIAGNOSIS — R63.0 POOR APPETITE: ICD-10-CM

## 2021-03-10 DIAGNOSIS — G62.9 NEUROPATHY: Primary | ICD-10-CM

## 2021-03-10 DIAGNOSIS — G56.03 BILATERAL CARPAL TUNNEL SYNDROME: ICD-10-CM

## 2021-03-10 DIAGNOSIS — R63.6 UNDERWEIGHT: ICD-10-CM

## 2021-03-10 PROCEDURE — 99214 OFFICE O/P EST MOD 30 MIN: CPT | Performed by: PHYSICIAN ASSISTANT

## 2021-03-10 RX ORDER — GABAPENTIN 300 MG/1
300 CAPSULE ORAL
Qty: 90 CAPSULE | Refills: 1 | Status: SHIPPED | OUTPATIENT
Start: 2021-03-10 | End: 2021-10-27 | Stop reason: SDUPTHER

## 2021-03-10 RX ORDER — METOPROLOL SUCCINATE 100 MG/1
100 TABLET, EXTENDED RELEASE ORAL DAILY
Qty: 90 TABLET | Refills: 1 | Status: SHIPPED | OUTPATIENT
Start: 2021-03-10 | End: 2021-10-27 | Stop reason: SDUPTHER

## 2021-03-10 RX ORDER — CYPROHEPTADINE HYDROCHLORIDE 4 MG/1
4 TABLET ORAL 2 TIMES DAILY
Qty: 180 TABLET | Refills: 1 | Status: SHIPPED | OUTPATIENT
Start: 2021-03-10 | End: 2021-10-27 | Stop reason: SDUPTHER

## 2021-03-10 RX ORDER — LOSARTAN POTASSIUM 100 MG/1
100 TABLET ORAL DAILY
Qty: 90 TABLET | Refills: 1 | Status: SHIPPED | OUTPATIENT
Start: 2021-03-10 | End: 2021-10-27 | Stop reason: SDUPTHER

## 2021-03-10 RX ORDER — AMLODIPINE BESYLATE 10 MG/1
10 TABLET ORAL DAILY
Qty: 90 TABLET | Refills: 1 | Status: SHIPPED | OUTPATIENT
Start: 2021-03-10 | End: 2021-10-27 | Stop reason: SDUPTHER

## 2021-03-10 NOTE — PATIENT INSTRUCTIONS
Dolor de melchor brooke   LO QUE NECESITA SABER:   El dolor de melchor brooke comienza repentinamente, [place] rápidamente y desaparece en unos días  El dolor podría ir y venir, o podría empeorar con ciertos movimientos  El dolor podría sentirse solamente en macedo melchor, o podría pasarse a ashlee brazos, espalda u hombros  También podría sentir dolor que comienza en otra área de macedo cuerpo y se pasa a macedo melchor  INSTRUCCIONES SOBRE EL [facility]OSPITALARIA:   Regrese a la asia de emergencias si:  · Usted tiene paco lesión que le provoca dolor en el melchor y dolor punzante debajo de ashlee brazos o piernas  · Macedo dolor de melchor se agrava repentinamente  · Usted tiene dolor de melchor junto con entumecimiento, hormigueo o debilidad en ashlee brazos o piernas  · Usted tiene rigidez en el melchor, dolor de [place] y [place]  Comuníquese con macedo médico si:  · Usted tiene nuevos síntomas o ashlee síntomas empeoran  · Ashlee síntomas continúan aún después del [place]  · Usted tiene preguntas o inquietudes acerca de macedo condición o cuidado  Medicamentos:  · [place], elvin el ibuprofeno, [facility] a disminuir la inflamación, el dolor y la [place]  Estos medicamentos pueden ser comprados sin orden de un médico  Pregunte a macedo médico cuál medicamento wali y con qué frecuencia  [address]  Cuando no se rajiv de la [employer], los medicamentos antiinflamatorios no esteroides pueden causar sangrado estomacal o problemas renales  Si usted esta tomando un anticoágulante,  siempre  pregunte si los AINEs son seguros para usted  · Acetaminofén sirve para calmar el dolor y bajar la fiebre del [place]  Pregunte a macedo médico cuánto wali y con qué frecuencia  [address]  El acetaminofén puede causar daño en el hígado cuando no se marybeth de forma correcta  · Medicamentos esteroideos podría usarse para reducir la inflamación  [place] puede ayudarlo a aliviar el dolor a causa de la inflamación      · [place] ashlee medicamentos elvin se le haya indicado  Consulte con macedo médico si usted randy que macedo medicamento no le está ayudando o si presenta efectos secundarios  Infórmele si es alérgico a cualquier medicamento  Mantenga paco lista actualizada de los [place], las vitaminas y los productos herbales que marybeth  Incluya los siguientes datos de los medicamentos: cantidad, frecuencia y motivo de administración  Traiga con usted la lista o los envases de las píldoras a mega citas de seguimiento  Lleve la lista de los medicamentos con usted en jeremiah de paco emergencia  Controle o evite el dolor de melchor brooke:  · Repose el melchor elvin se lo indiquen  No realice movimientos repentinos, elvin voltear macedo evan rápidamente  Macedo médico podría recomendarle que use un collarín cervical por un periodo corto de [place]  El collarín evitará que usted [place] macedo [place]  [place] ayudará a darle tiempo a macedo melchor para que sane si es que paco lesión está provocando macedo dolor  Pregunte a macedo médico cuándo puede volver a practicar deportes o realizar otras actividades cotidianas  · Aplique calor según indicaciones  El calor ayuda a aliviar el dolor y la inflamación  Use paco envoltura caliente o empape paco toalla pequeña en agua tibia  Escurra el exceso de [place]  Aplique la venda caliente o la toalla por 20 minutos cada hora o elvin se le indique  · Aplique hielo según las indicaciones  El hielo ayuda a aliviar el dolor y la inflamación, y a evitar daño al tejido  Use un paquete con hielo o ponga hielo en paco bolsa  Cubra el paquete con hielo o la espalda con paco toalla antes de aplicarlo en macedo melchor  Aplique el paquete de hielo o la bolsa por 15 minutos cada hora o elvin se lo indiquen  Macedo médico puede indicarle con qué frecuencia aplicar el hielo  · [place] ejercicios para el melchor elvin se lo indiquen  Los ejercicios para el melchor ayudan a [employer] y a aumentar el rango de [place]   Macedo médico le indicará cuáles ejercicios son adecuados para usted  Podría darle instrucciones o podría recomendarle que acuda con un fisioterapeuta  Macedo médico o terapeuta pueden asegurarse que usted esté haciendo estos ejercicios correctamente  · Mantenga paco buena postura  Trate de mantener macedo evan y hombros levantados cuando se siente  Si usted trabaja en frente de paco computadora, asegúrese de que el monitor esté al nivel adecuado  Usted no debería tener que mirar hacia abajo para fede la pantalla  [place] debe tener que inclinarse hacia adelante para poder leer lo que está en la pantalla  Asegúrese de que macedo teclado, ratón y otros artículos de computadora estén colocados en donde usted no tenga que extender mega hombros para alcanzarlos  Levántese a menudo si usted trabaja frente a paco computadora o permanece sentado zev largos períodos  Estírese o camine para [employer] de macedo melchor relajados  Acuda a mega consultas de control con macedo médico según le indicaron  Macedo médico podría referirlo a un especialista si macedo dolor no mejora con el tratamiento  Anote mega preguntas para que se acuerde de hacerlas zev mega visitas  © Copyright [address] Information is for End User's use only and may not be sold, redistributed or otherwise used for commercial purposes  All illustrations and images included in CareNotes® are the copyrighted property of A [employer]â„¢  or [address] [place] [address] es sólo para uso en educación  Macedo intención no es darle un consejo médico sobre enfermedades o tratamientos  Colsulte con macedo Deneen Ibarra farmacéutico antes de seguir cualquier régimen médico para saber si es seguro y efectivo para usted  Síndrome del túnel chantal   LO QUE USTED DEBE SABER:   El síndrome de túnel carpiano (STC) es paco condición que ocurre cuando hay un aumento de la presión sobre el nervio mediano en la [address]  El nervio mediano controla los músculos y la sensación en la [place]   El uso excesivo e inflamación en los ligamentos en la frantz podría ser Ana Maria Snuffer causa de la presión  INSTRUCCIONES:   Medicamentos:   · Medicamento antiinflamatorio no esteroideo (SASHA): Estos medicamentos reducen la inflamación y el dolor  Los [place] están disponibles sin [place]  Consulte con macedo médico para determinar cuál medicamento y qué dosis es [place] para usted  Tómelos elvin es indicado  Los SASHA pueden causar sangrado estomacal o problemas renales si no se rajiv correctamente  · [place] mega medicamentos elvin se le haya indicado  Llame a macedo proveedor de tisha si piensa que macedo medicamento no le está ayudando o tiene efectos secundarios  Infórmele si es alérgico a algún medicamento  Mantenga paco lista de mega medicamentos, vitaminas, y hierbas que está tomando  Incluya la cantidad que marybeth, la [place], y por qué las marybeth  Traiga la lista o las botellas de las píldoras a mega visitas de seguimiento  Lleve siempre consigo paco lista de mega medicamentos en jeremiah de emergencia  Programe paco kushal con macedo proveedor de [employer] se le haya indicado: Anote mega preguntas para que se acuerde de hacerlas zev mega visitas  Cómo manejar mega síntomas:   · Utilice hielo:  El hielo ayuda a reducir la inflamación y el dolor en macedo frantz  El hielo también podría ayudar a prevenir daño al tejido  Utilice paco compresa de hielo o ponga hielo favian en paco bolsa de plástico  Cubra la compresa de hielo con paco toalla y colóquela sobre macedo frantz por 15 a 20 minutos cada hora  · Elen[place] Night y ocupacional:  Un terapeuta físico le demostrará maneras de realizar ejercicios y fortalecer macedo frantz  Un terapeuta ocupacional le demostrará maneras seguras de usar macedo frantz mientras realiza mega [place]  · Descanse mega kamala:  Permita que mega kamala descansen por un tiempo cuando hace movimientos repetitivos elvin la mecanografía  Suspenda lo que está haciendo cuando usted sienta dolor en macedo frantz y suavemente realice un masaje en macedo frantz y [place]      · Use paco férula para la frantz: Esta mantiene mariscal frantz recta o en paco posición ligeramente flexionada  Paco férula para la frantz reduce la presión en el nervio mediano y de esta manera descansa la [place] [address]  Es probable que usted necesite utilizar paco férula por un clive de 8 semanas  Podría ser necesario usar la férula para la frantz en la noche  · Evalúe ashlee hábitos laborales: Pida información acerca de maneras que usted puede modificar mariscal trabajo para ayudar a reducir ashlee síntomas  Comuníquese con mariscal médico de cabecera si:   · Ashlee síntomas empeoran  · Usted tiene preguntas o inquietudes acerca de mariscal condición o cuidado  Regrese a la asia de emergencia si:   · Repentinamente usted pierde la sensibilidad en mariscal mano o dedos y no puede moverlos  · Repentinamente mariscal mano cambia de color  © 2014 [address] is for End User's use only and may not be sold, redistributed or otherwise used for commercial purposes  All illustrations and images included in CareNotes® are the copyrighted property of A D A M , Inc  or Primo Rust  Esta información es sólo para uso en educación  Mariscal intención no es darle un consejo médico sobre enfermedades o tratamientos  Colsulte con mariscal [employer]ose farmacéutico antes de seguir cualquier régimen médico para saber si es seguro y efectivo para usted  Ejercicios para el síndrome del túnel carpiano   CUIDADO AMBULATORIO:   Lo que usted necesita saber sobre los ejercicios para el síndrome del túnel carpiano: Los ejercicios para el síndrome del túnel carpiano podrían ayudar a aliviar el dolor y aumentar mariscal arco de movilidad  Mariscal médico o terapeuta físico le puede indicar con que frecuencia necesita hacer los ejercicios  Los ejercicios para síndrome del túnel chantal:  · Extensión de los dedos: Junte el pulgar y el anuj de ashlee dedos  Manténgalos rectos  Coloque paco goma elástica alredmichaelor [employer] dedos y pulgar  Separe los dedos   Brenda [place] júntelos lentamente sin permitir que la goma elástica se caiga  Repita 40 veces  · Estiramiento del flexor de la frantz: Mantenga el Vara Yanira recto  Agarre mega dedos con la otra mano  Lentamente doble los dedos hacia atrás (con la youssef hacia fuera) hasta que sienta un estiramiento en la [address]  Sostenga está posición por 10 segundos  Repita 5 veces  · Estiramiento del extensor de la frantz: Mantenga el Vara Yanira recto  Agarre mega dedos con la otra mano  Lentamente doble los dedos y la mano hacia abajo (con la youssef hacia usted) hasta que siente el estiramiento encima de la [place]  Sostenga está posición por 10 segundos  Repita 5 veces  · Rotación de la frantz sin usar pesas: Siéntese en paco silla con mariscal antebrazo apoyado sobre mariscal muslo o sobre paco keyes  Con la youssef de mariscal mano hacia abajo, flexione la [address] 3 pulgadas hacia arriba y luego bájela lentamente  Voltee el antebrazo y repita con la youssef [place] arriba  Huber cada ejercicio 20 veces  · Encogimiento de hombros: Párese con los brazos a los lados  Levante mega hombros hacia mega oídos y sosténgalos zev 1 keith  Luego mueva mega hombros hacia atrás, elvin si fuera a juntar los omóplatos  Mantenga esta posición zev 1 keith  Luego relaje los hombros  Repita 20 veces  © Copyright [address] Information is for End User's use only and may not be sold, redistributed or otherwise used for commercial purposes  All illustrations and images included in CareNotes® are the copyrighted property of A [employer]  or [address] [place] [address] es sólo para uso en educación  Mariscal intención no es darle un consejo médico sobre enfermedades o tratamientos  Colsulte con mariscal Deneen Ibarra farmacéutico antes de seguir cualquier régimen médico para saber si es seguro y efectivo para usted

## 2021-03-10 NOTE — PROGRESS NOTES
Assessment/Plan:    Hypertension  - Continue metoprolol 100 mg once daily and losartan 100 mg once daily  Will increase amlodipine to 10 mg once daily  - Continue to maintain healthy balanced diet with focus on low salt intake  Limit alcohol intake  - Advised to exercise at least 30 minutes a day for at least 5 days out of the week       Underweight/poor appetite  - Patient has good appetite when taking cyproheptadine  Weight has been stable and BMI is within normal range (20 78 kg/m2)  - Advised to continue taking cyproheptadine 4 mg twice daily   Continue drinking Ensure as needed   Will continue to monitor      Neuropathy  - Will increase gabapentin to 300 mg once daily at bedtime  Bilateral carpal tunnel syndrome  - Explained  to patient her symptoms may be secondary to carpal tunnel syndrome  Explained causes and pathophysiology to patient  - Advised patient to purchase bilateral wrist splints, to be worn at night for 6 weeks  - Will follow-up at next visit  If no improvement, will consider referral to Hand surgery        Diagnoses and all orders for this visit:    Neuropathy  -     gabapentin (NEURONTIN) 300 mg capsule; Take 1 capsule (300 mg total) by mouth daily at bedtime    Essential hypertension  -     amLODIPine (NORVASC) 10 mg tablet; Take 1 tablet (10 mg total) by mouth daily  -     losartan (COZAAR) 100 MG tablet; Take 1 tablet (100 mg total) by mouth daily  -     metoprolol succinate (TOPROL-XL) 100 mg 24 hr tablet; Take 1 tablet (100 mg total) by mouth daily    Poor appetite  -     cyproheptadine (PERIACTIN) 4 mg tablet; Take 1 tablet (4 mg total) by mouth 2 (two) times a day    Underweight    Bilateral carpal tunnel syndrome          All of patients questions were answered  Patient understands and agrees with the above plan  Return in about 3 months (around 6/10/2021) for Next scheduled follow up HTN, carpal tunnel       Rigo Mg PA-C  03/10/21  NATHALIE Naidu Subjective:     Patient ID: Vannessa Byrne  is a 64 y o  female  has a past medical history of Asthma, History of transfusion, and Hypertension  who presents today in office for   Hypertension follow-up  - Patient is a 64 y o  female who presents today for  Hypertension follow-up  Hypertension: Currently taking metoprolol 100 mg once daily, losartan 100 mg once daily, and amlodipine 5 mg once daily   Patient denies any headaches, dizziness, chest pain, lower leg swelling        Underweight/poor appetite:   Currently taking cyproheptadine 4 mg twice daily  Patient notes the medication continues to be very helpful  Patient notes she is eating and drinking well  Patient has been drinking Ensure as needed      - At last visit in December, patient was complaining of cramping and burning sensation of bilateral hands and bilateral feet  It would occur mostly at night and would wake her up from sleep  Patient was prescribed gabapentin 100 mg daily at bedtime  Today, patient notes the medication has been helpful, but then she ran out of it a few months ago  Patient notes what really bothers her is the pain and numbness of bilateral hands  Patient notes it does continue to wake her up at night because of the pain  Patient notes her left hand is worse than her right hand  Patient notes the gabapentin helps with the pain, but the numbness persists  Patient notes the symptoms are throughout all of her fingers  The following portions of the patient's history were reviewed and updated as appropriate: allergies, current medications, past family history, past medical history, past social history, past surgical history and problem list         Review of Systems   Constitutional: Negative for appetite change, fatigue and fever  HENT: Negative for congestion, ear pain, rhinorrhea and sore throat  Eyes: Negative for pain and visual disturbance     Respiratory: Negative for cough, chest tightness and shortness of breath  Cardiovascular: Negative for chest pain, palpitations and leg swelling  Gastrointestinal: Negative for abdominal pain, constipation, diarrhea, nausea and vomiting  Genitourinary: Negative for difficulty urinating and dysuria  Musculoskeletal: Positive for arthralgias (bilateral hands during the night and morning)  Skin: Negative for rash  Neurological: Positive for numbness (b/l hands)  Negative for dizziness and headaches  Psychiatric/Behavioral: Negative for behavioral problems  The patient is not nervous/anxious  Objective:   Vitals:    03/10/21 1127   BP: 132/88   BP Location: Left arm   Patient Position: Sitting   Cuff Size: Standard   Pulse: 86   Resp: 18   Temp: 97 6 °F (36 4 °C)   TempSrc: Temporal   SpO2: 98%   Weight: 49 9 kg (110 lb)   Height: 5' 1" (1 549 m)         Physical Exam  Vitals signs and nursing note reviewed  Constitutional:       General: She is not in acute distress  Appearance: She is well-developed  HENT:      Head: Normocephalic and atraumatic  Right Ear: External ear normal       Left Ear: External ear normal       Nose: Nose normal    Eyes:      Conjunctiva/sclera: Conjunctivae normal       Pupils: Pupils are equal, round, and reactive to light  Neck:      Musculoskeletal: Normal range of motion and neck supple  Cardiovascular:      Rate and Rhythm: Normal rate and regular rhythm  Heart sounds: Normal heart sounds  No murmur  Pulmonary:      Effort: Pulmonary effort is normal       Breath sounds: Normal breath sounds  No wheezing  Musculoskeletal: Normal range of motion  General: No swelling  Comments: Tinel's positive left sided, negative right sided  Skin:     General: Skin is warm and dry  Neurological:      Mental Status: She is alert and oriented to person, place, and time     Psychiatric:         Mood and Affect: Mood normal          Speech: Speech normal  Behavior: Behavior normal            - Utilized [email or web address] phones for translation

## 2021-06-14 ENCOUNTER — HOSPITAL ENCOUNTER (EMERGENCY)
Facility: HOSPITAL | Age: 61
Discharge: HOME | End: 2021-06-14
Attending: EMERGENCY MEDICINE | Admitting: EMERGENCY MEDICINE
Payer: COMMERCIAL

## 2021-06-14 VITALS
RESPIRATION RATE: 16 BRPM | WEIGHT: 107.8 LBS | HEART RATE: 72 BPM | BODY MASS INDEX: 20.37 KG/M2 | OXYGEN SATURATION: 100 % | SYSTOLIC BLOOD PRESSURE: 160 MMHG | DIASTOLIC BLOOD PRESSURE: 104 MMHG | TEMPERATURE: 97.4 F

## 2021-06-14 DIAGNOSIS — M54.2 NECK PAIN: Primary | ICD-10-CM

## 2021-06-14 DIAGNOSIS — M62.838 TRAPEZIUS MUSCLE SPASM: ICD-10-CM

## 2021-06-14 PROCEDURE — 99283 EMERGENCY DEPT VISIT LOW MDM: CPT

## 2021-06-14 PROCEDURE — 96372 THER/PROPH/DIAG INJ SC/IM: CPT

## 2021-06-14 PROCEDURE — 99284 EMERGENCY DEPT VISIT MOD MDM: CPT | Performed by: EMERGENCY MEDICINE

## 2021-06-14 RX ORDER — CYCLOBENZAPRINE HCL 10 MG
10 TABLET ORAL 2 TIMES DAILY
Qty: 20 TABLET | Refills: 0 | Status: SHIPPED | OUTPATIENT
Start: 2021-06-14 | End: 2021-06-25 | Stop reason: SDUPTHER

## 2021-06-14 RX ORDER — FAMOTIDINE 20 MG/1
20 TABLET, FILM COATED ORAL ONCE
Status: COMPLETED | OUTPATIENT
Start: 2021-06-14 | End: 2021-06-14

## 2021-06-14 RX ORDER — CYCLOBENZAPRINE HCL 10 MG
10 TABLET ORAL ONCE
Status: COMPLETED | OUTPATIENT
Start: 2021-06-14 | End: 2021-06-14

## 2021-06-14 RX ORDER — IBUPROFEN 400 MG/1
400 TABLET ORAL EVERY 8 HOURS SCHEDULED
Qty: 21 TABLET | Refills: 0 | Status: SHIPPED | OUTPATIENT
Start: 2021-06-14 | End: 2021-06-25 | Stop reason: SDUPTHER

## 2021-06-14 RX ORDER — KETOROLAC TROMETHAMINE 30 MG/ML
15 INJECTION, SOLUTION INTRAMUSCULAR; INTRAVENOUS ONCE
Status: COMPLETED | OUTPATIENT
Start: 2021-06-14 | End: 2021-06-14

## 2021-06-14 RX ADMIN — KETOROLAC TROMETHAMINE 15 MG: 30 INJECTION, SOLUTION INTRAMUSCULAR; INTRAVENOUS at 11:44

## 2021-06-14 RX ADMIN — FAMOTIDINE 20 MG: 20 TABLET ORAL at 11:44

## 2021-06-14 RX ADMIN — CYCLOBENZAPRINE HYDROCHLORIDE 10 MG: 10 TABLET, FILM COATED ORAL at 11:44

## 2021-06-14 NOTE — ED PROVIDER NOTES
History  Chief Complaint   Patient presents with    Neck Pain     R shoulder R upper back and neck pain no meds today     64 yr old female presents with neck and right upper back pain x 3 days  Noted upon waking with worsening severity over time associated with movement and lying on her side, improved with advil  Denies any trauma, fever or limb weakness or radiating paresthesia  History provided by:  Patient  Neck Pain  Pain location:  R side  Quality:  Aching  Pain radiates to:  R shoulder (right upper back)  Pain severity:  Severe  Pain is:  Same all the time  Onset quality:  Gradual  Duration:  3 days  Timing:  Constant  Progression:  Worsening  Chronicity:  New  Context: not fall, not lifting a heavy object and not recent injury    Relieved by:  NSAIDs  Worsened by:  Twisting and bending  Ineffective treatments:  None tried  Associated symptoms: no chest pain, no fever, no headaches, no numbness, no paresis, no syncope, no tingling, no visual change and no weakness    Risk factors: hx of spinal trauma (lumbar)        Prior to Admission Medications   Prescriptions Last Dose Informant Patient Reported? Taking?    Nutritional Supplements (ENSURE ORIGINAL) LIQD   No No   Sig: Take 1 Bottle by mouth daily   acetaminophen (TYLENOL) 650 mg CR tablet   No No   Sig: Take 1 tablet (650 mg total) by mouth every 8 (eight) hours as needed for mild pain   amLODIPine (NORVASC) 10 mg tablet   No No   Sig: Take 1 tablet (10 mg total) by mouth daily   cyproheptadine (PERIACTIN) 4 mg tablet   No No   Sig: Take 1 tablet (4 mg total) by mouth 2 (two) times a day   gabapentin (NEURONTIN) 300 mg capsule   No No   Sig: Take 1 capsule (300 mg total) by mouth daily at bedtime   losartan (COZAAR) 100 MG tablet   No No   Sig: Take 1 tablet (100 mg total) by mouth daily   metoprolol succinate (TOPROL-XL) 100 mg 24 hr tablet   No No   Sig: Take 1 tablet (100 mg total) by mouth daily   mineral oil-hydrophilic petrolatum (AQUAPHOR) ointment   No No   Sig: Apply topically as needed for dry skin   Patient not taking: Reported on 2020      Facility-Administered Medications: None       Past Medical History:   Diagnosis Date    Asthma     History of transfusion     Hypertension        Past Surgical History:   Procedure Laterality Date    BREAST BIOPSY Right 1999    benign     SECTION      SPINE SURGERY         Family History   Problem Relation Age of Onset    Cancer Mother     Cancer Brother      I have reviewed and agree with the history as documented  E-Cigarette/Vaping     E-Cigarette/Vaping Substances     Social History     Tobacco Use    Smoking status: Never Smoker    Smokeless tobacco: Never Used   Substance Use Topics    Alcohol use: Yes    Drug use: Never        Review of Systems   Constitutional: Negative for fever  HENT: Negative for ear pain, rhinorrhea and sore throat  Eyes: Negative for visual disturbance  Respiratory: Negative for cough and shortness of breath  Cardiovascular: Negative for chest pain, palpitations and syncope  Gastrointestinal: Negative for abdominal pain, constipation, diarrhea, nausea and vomiting  Genitourinary: Negative for dysuria and hematuria  Musculoskeletal: Positive for back pain and neck pain  Negative for arthralgias and neck stiffness  Skin: Negative for rash  Neurological: Negative for tingling, seizures, syncope, facial asymmetry, weakness, numbness and headaches  All other systems reviewed and are negative        Physical Exam  ED Triage Vitals   Temperature Pulse Respirations Blood Pressure SpO2   21 1048 21 1048 21 1048 21 1048 21 1048   (!) 97 4 °F (36 3 °C) 72 16 (!) 160/104 100 %      Temp Source Heart Rate Source Patient Position - Orthostatic VS BP Location FiO2 (%)   21 1048 21 1048 -- -- --   Tympanic Monitor         Pain Score       21 1122       9             Orthostatic Vital Signs  Vitals: 06/14/21 1048   BP: (!) 160/104   Pulse: 72       Physical Exam  Vitals reviewed  Constitutional:       Appearance: She is normal weight  She is not ill-appearing  HENT:      Head: Normocephalic and atraumatic  Nose: Nose normal       Mouth/Throat:      Mouth: Mucous membranes are moist       Pharynx: Oropharynx is clear  Eyes:      Extraocular Movements: Extraocular movements intact  Conjunctiva/sclera: Conjunctivae normal    Cardiovascular:      Rate and Rhythm: Normal rate and regular rhythm  Pulses: Normal pulses  Heart sounds: Normal heart sounds  No murmur heard  No friction rub  No gallop  Pulmonary:      Effort: Pulmonary effort is normal  No respiratory distress  Breath sounds: Normal breath sounds  No wheezing, rhonchi or rales  Chest:      Chest wall: No tenderness  Abdominal:      General: Abdomen is flat  Bowel sounds are normal  There is no distension  Palpations: Abdomen is soft  There is no mass  Tenderness: There is abdominal tenderness (epigastrium - coffee with no breakfast eatten this am)  There is no guarding or rebound  Musculoskeletal:         General: Normal range of motion  Cervical back: Tenderness (right posterior aspect) present  No rigidity  Right lower leg: No edema  Left lower leg: No edema  Lymphadenopathy:      Cervical: No cervical adenopathy  Skin:     General: Skin is warm and dry  Findings: No rash  Neurological:      General: No focal deficit present  Mental Status: She is alert and oriented to person, place, and time  GCS: GCS eye subscore is 4  GCS verbal subscore is 5  GCS motor subscore is 6  Cranial Nerves: Cranial nerves are intact  No cranial nerve deficit  Sensory: Sensation is intact  Motor: Motor function is intact  Gait: Gait is intact  Psychiatric:         Mood and Affect: Mood normal          Behavior: Behavior normal          Thought Content:  Thought content normal          ED Medications  Medications   ketorolac (TORADOL) injection 15 mg (15 mg Intramuscular Given 6/14/21 1144)   cyclobenzaprine (FLEXERIL) tablet 10 mg (10 mg Oral Given 6/14/21 1144)   famotidine (PEPCID) tablet 20 mg (20 mg Oral Given 6/14/21 1144)       Diagnostic Studies  Results Reviewed     None                 No orders to display             ED Course       MDM  Number of Diagnoses or Management Options  Neck pain: new and does not require workup  Trapezius muscle spasm: new and does not require workup  Diagnosis management comments: 64 yr old female presents with 43 day history of right neck and back pain noted on waking up  Worsening  Relieved with Advil  No central spinal tenderness or step defect noted on exam  Likely due to trapezius muscle spasm  Advised warm compresses with gentle stretching exercises as tolerated  IM Toradol 15mg, flexeril 10mg po and PEPCID 20mg po given in ED and patient discharged with 7 days ibuprofen 400mg TID and Flexeril 10mg BID x 10 days  Patient noted mild improvement to pain shortly after treatment in ED with severity falling from 9 to 7/10 in severity  Elevated BP on assessment due to pain and non compliance with antihypertensives this am  No signs of hypertensive emergency present  Advised to ensure to take home meds ASAP      Risk of Complications, Morbidity, and/or Mortality  Presenting problems: low  Management options: low    Patient Progress  Patient progress: stable      Disposition  Final diagnoses:   Neck pain   Trapezius muscle spasm     Time reflects when diagnosis was documented in both MDM as applicable and the Disposition within this note     Time User Action Codes Description Comment    6/14/2021 11:17 AM Terri Bae Add [M54 2] Neck pain     6/14/2021 11:17 AM Terri Bae Add [J25 668] Trapezius muscle spasm       ED Disposition     ED Disposition Condition Date/Time Comment    Discharge Fair Mon Jun 14, 2021 11:17 AM Ravi Arrieta discharge to home/self care  Follow-up Information     Follow up With Specialties Details Why Contact Info Additional Information    Mary Lou Dey PA-C Family Medicine Go in 2 weeks for previously scheduled appointment [address]  [address] [facility]  [address]        [facility] Emergency Department Emergency Medicine Go to  If symptoms worsen [address]   [id number]  [facility] Emergency Department          Patient's Medications   Discharge Prescriptions    CYCLOBENZAPRINE (FLEXERIL) 10 MG TABLET    Take 1 tablet (10 mg total) by mouth 2 (two) times a day for 10 days       Start Date: 6/14/2021 End Date: 6/24/2021       Order Dose: 10 mg       Quantity: 20 tablet    Refills: 0    IBUPROFEN (MOTRIN) 400 MG TABLET    Take 1 tablet (400 mg total) by mouth every 8 (eight) hours for 7 days       Start Date: 6/14/2021 End Date: 6/21/2021       Order Dose: 400 mg       Quantity: 21 tablet    Refills: 0     No discharge procedures on file  PDMP Review     None           ED Provider  Attending physically available and evaluated Ravi Arrieta I managed the patient along with the ED Attending      Electronically Signed by         Javier Ruvalcaba MD  06/14/21 7087

## 2021-06-14 NOTE — DISCHARGE INSTRUCTIONS
You may apply splint to wrist at night to assist with Carpal Tunnel Syndrome symptoms  Ensure to take your antihypertensive home medication as soon as possible this morning

## 2021-06-25 ENCOUNTER — OFFICE VISIT (OUTPATIENT)
Dept: FAMILY MEDICINE CLINIC | Facility: CLINIC | Age: 61
End: 2021-06-25

## 2021-06-25 VITALS
SYSTOLIC BLOOD PRESSURE: 162 MMHG | DIASTOLIC BLOOD PRESSURE: 104 MMHG | TEMPERATURE: 98.6 F | HEIGHT: 61 IN | BODY MASS INDEX: 20.92 KG/M2 | WEIGHT: 110.8 LBS | OXYGEN SATURATION: 99 % | HEART RATE: 67 BPM | RESPIRATION RATE: 18 BRPM

## 2021-06-25 DIAGNOSIS — R63.0 POOR APPETITE: ICD-10-CM

## 2021-06-25 DIAGNOSIS — R19.7 DIARRHEA, UNSPECIFIED TYPE: ICD-10-CM

## 2021-06-25 DIAGNOSIS — M62.838 TRAPEZIUS MUSCLE SPASM: Primary | ICD-10-CM

## 2021-06-25 DIAGNOSIS — G62.9 NEUROPATHY: ICD-10-CM

## 2021-06-25 DIAGNOSIS — R63.6 UNDERWEIGHT: ICD-10-CM

## 2021-06-25 DIAGNOSIS — I10 ESSENTIAL HYPERTENSION: ICD-10-CM

## 2021-06-25 DIAGNOSIS — Z12.11 COLON CANCER SCREENING: ICD-10-CM

## 2021-06-25 DIAGNOSIS — R10.32 LEFT LOWER QUADRANT ABDOMINAL PAIN: ICD-10-CM

## 2021-06-25 DIAGNOSIS — Z13.31 DEPRESSION SCREEN: ICD-10-CM

## 2021-06-25 DIAGNOSIS — Z80.0 FAMILY HISTORY OF COLON CANCER IN MOTHER: ICD-10-CM

## 2021-06-25 DIAGNOSIS — M54.2 NECK PAIN: ICD-10-CM

## 2021-06-25 PROCEDURE — 3077F SYST BP >= 140 MM HG: CPT | Performed by: PHYSICIAN ASSISTANT

## 2021-06-25 PROCEDURE — 99214 OFFICE O/P EST MOD 30 MIN: CPT | Performed by: PHYSICIAN ASSISTANT

## 2021-06-25 PROCEDURE — T1015 CLINIC SERVICE: HCPCS | Performed by: PHYSICIAN ASSISTANT

## 2021-06-25 PROCEDURE — 3080F DIAST BP >= 90 MM HG: CPT | Performed by: PHYSICIAN ASSISTANT

## 2021-06-25 RX ORDER — IBUPROFEN 400 MG/1
400 TABLET ORAL EVERY 8 HOURS SCHEDULED
Qty: 30 TABLET | Refills: 1 | Status: SHIPPED | OUTPATIENT
Start: 2021-06-25 | End: 2022-06-16 | Stop reason: SDUPTHER

## 2021-06-25 RX ORDER — CYCLOBENZAPRINE HCL 10 MG
10 TABLET ORAL 2 TIMES DAILY
Qty: 30 TABLET | Refills: 1 | Status: SHIPPED | OUTPATIENT
Start: 2021-06-25 | End: 2021-10-27 | Stop reason: SDUPTHER

## 2021-06-25 NOTE — PROGRESS NOTES
Assessment/Plan:    Left lower quadrant abdominal pain/diarrhea / colon cancer screening/ family history of colon cancer in mother   - Will refer to Gastroenterology for further evaluation and management  Neck pain/trapezius muscle spasm  - Stable  Continue Flexeril 10 mg twice daily as needed and ibuprofen 400 mg, every 8 hours as needed for pain  - Advised to apply heating pad / ice as needed to the affected area  Hypertension  - Blood pressure is elevated today but patient admits to currently suffering from a headache    - Continue metoprolol 100 mg once daily, losartan 100 mg once daily, and amlodipine 10 mg once daily    - Continue to maintain healthy balanced diet with focus on low salt intake  Limit alcohol intake  - Advised to exercise at least 30 minutes a day for at least 5 days out of the week       Underweight/poor appetite  - Patient has good appetite when taking cyproheptadine  Rayshawn Hertz has been stable and BMI is within normal range (20 94 kg/m2)  - Advised to continue taking cyproheptadine 4 mg twice daily   Continue drinking Ensure as needed   Will continue to monitor      Neuropathy  -  Symptoms have improved  Continue gabapentin 300 mg once daily at bedtime  Diagnoses and all orders for this visit:    Trapezius muscle spasm  -     cyclobenzaprine (FLEXERIL) 10 mg tablet; Take 1 tablet (10 mg total) by mouth 2 (two) times a day  -     ibuprofen (MOTRIN) 400 mg tablet; Take 1 tablet (400 mg total) by mouth every 8 (eight) hours    Depression screen    Neck pain  -     cyclobenzaprine (FLEXERIL) 10 mg tablet; Take 1 tablet (10 mg total) by mouth 2 (two) times a day  -     ibuprofen (MOTRIN) 400 mg tablet; Take 1 tablet (400 mg total) by mouth every 8 (eight) hours    Colon cancer screening  -     Ambulatory referral to Gastroenterology; Future    Family history of colon cancer in mother  -     Ambulatory referral to Gastroenterology;  Future    Left lower quadrant abdominal pain  - Ambulatory referral to Gastroenterology; Future    Diarrhea, unspecified type  -     Ambulatory referral to Gastroenterology; Future    Essential hypertension    Poor appetite    Underweight    Neuropathy          All of patients questions were answered  Patient understands and agrees with the above plan  Return in about 2 months (around 8/25/2021) for Next scheduled follow up abdominal pain, HTN, neck pain  Lori Hirsch PA-C  06/25/21  [address] [facility] [place]          Subjective:     Patient ID: Tamiko Carlson  is a 64 y o  female  has a past medical history of Asthma, History of transfusion, and Hypertension  who presents today in office for follow up  - Patient is a 64 y o  female who presents today for follow up  Patient presented to [address] ED on 06/14/2021 due to neck pain  Patient notes she was prescribed Flexeril and ibuprofen and that has been helping  Hypertension: Currently taking metoprolol 100 mg once daily, losartan 100 mg once daily, and amlodipine 10 mg once daily   Patient denies any chest pain, lower leg swelling  Patient notes sometimes she does experience headaches with associated dizziness when moving her head quickly      Underweight/poor appetite:   Currently taking cyproheptadine 4 mg twice daily   Patient notes the medication continues to be very helpful   Patient notes she is eating and drinking well   Patient has been drinking Ensure as needed  - Patient notes over the past 2 weeks she has been experiencing left lower quadrant abdominal pain  Patient notes she often will experience the pain, the area will become swollen, and then she has to use the bathroom  Patient notes after she uses the bathroom as diarrhea, she does feel somewhat better  Patient notes this occurs about once every 2-3 days  Patient denies any associated nausea or vomiting and denies any blood in the stool    Patient notes her mother has history of colon cancer was diagnosed when she was around [de-identified]years old  Neuropathy:   Currently taking gabapentin 300 mg daily at bedtime  Patient notes since increasing the dosage, the numbness has not really been an issue  Patient denies any pain of her hands  Patient notes she is no longer experiencing pain that wakes her up from sleep  The following portions of the patient's history were reviewed and updated as appropriate: allergies, current medications, past family history, past medical history, past social history, past surgical history and problem list         Review of Systems   Constitutional: Negative for chills, fatigue and fever  HENT: Negative for congestion, ear pain, rhinorrhea and sore throat  Eyes: Negative for pain and visual disturbance  Respiratory: Negative for cough, chest tightness and shortness of breath  Cardiovascular: Negative for chest pain and palpitations  Gastrointestinal: Positive for abdominal pain and diarrhea  Negative for blood in stool, constipation, nausea and vomiting  Genitourinary: Negative for difficulty urinating and dysuria  Musculoskeletal: Positive for arthralgias and neck pain  Negative for neck stiffness  Skin: Negative for rash  Neurological: Positive for dizziness, numbness (b/l hands - improving) and headaches  Psychiatric/Behavioral: Negative for behavioral problems  The patient is not nervous/anxious  Objective:   Vitals:    06/25/21 1124   BP: (!) 162/104   BP Location: Left arm   Patient Position: Sitting   Cuff Size: Child   Pulse: 67   Resp: 18   Temp: 98 6 °F (37 °C)   TempSrc: Temporal   SpO2: 99%   Weight: 50 3 kg (110 lb 12 8 oz)   Height: 5' 1" (1 549 m)         Physical Exam  Vitals and nursing note reviewed  Constitutional:       General: She is not in acute distress  Appearance: She is well-developed  HENT:      Head: Normocephalic and atraumatic        Right Ear: External ear normal       Left Ear: External ear normal       Nose: Nose normal    Eyes:      Conjunctiva/sclera: Conjunctivae normal       Pupils: Pupils are equal, round, and reactive to light  Cardiovascular:      Rate and Rhythm: Normal rate and regular rhythm  Heart sounds: Normal heart sounds  No murmur heard  Pulmonary:      Effort: Pulmonary effort is normal       Breath sounds: Normal breath sounds  No wheezing  Abdominal:      General: Abdomen is flat  Bowel sounds are normal       Palpations: Abdomen is soft  Tenderness: There is abdominal tenderness in the left upper quadrant and left lower quadrant  There is no guarding or rebound  Musculoskeletal:         General: No swelling  Normal range of motion  Cervical back: Normal range of motion and neck supple  Skin:     General: Skin is warm and dry  Neurological:      Mental Status: She is alert and oriented to person, place, and time  Psychiatric:         Mood and Affect: Mood normal          Speech: Speech normal          Behavior: Behavior normal            PHQ-9 Depression Screening    PHQ-9:   Frequency of the following problems over the past two weeks:      Little interest or pleasure in doing things: 0 - not at all  Feeling down, depressed, or hopeless: 0 - not at all  PHQ-2 Score: 0       - Utilized [employer] / ?????.?? for translation

## 2021-06-29 PROBLEM — Z80.0 FAMILY HISTORY OF COLON CANCER IN MOTHER: Status: ACTIVE | Noted: 2021-06-29

## 2021-10-27 ENCOUNTER — OFFICE VISIT (OUTPATIENT)
Dept: FAMILY MEDICINE CLINIC | Facility: CLINIC | Age: 61
End: 2021-10-27

## 2021-10-27 VITALS
HEIGHT: 61 IN | OXYGEN SATURATION: 97 % | RESPIRATION RATE: 18 BRPM | SYSTOLIC BLOOD PRESSURE: 128 MMHG | DIASTOLIC BLOOD PRESSURE: 88 MMHG | TEMPERATURE: 96.8 F | HEART RATE: 69 BPM | BODY MASS INDEX: 21.34 KG/M2 | WEIGHT: 113 LBS

## 2021-10-27 DIAGNOSIS — Z12.31 BREAST CANCER SCREENING BY MAMMOGRAM: ICD-10-CM

## 2021-10-27 DIAGNOSIS — I10 ESSENTIAL HYPERTENSION: Primary | ICD-10-CM

## 2021-10-27 DIAGNOSIS — R63.6 UNDERWEIGHT: ICD-10-CM

## 2021-10-27 DIAGNOSIS — G56.03 BILATERAL CARPAL TUNNEL SYNDROME: ICD-10-CM

## 2021-10-27 DIAGNOSIS — M54.2 NECK PAIN: ICD-10-CM

## 2021-10-27 DIAGNOSIS — M62.838 TRAPEZIUS MUSCLE SPASM: ICD-10-CM

## 2021-10-27 DIAGNOSIS — G62.9 NEUROPATHY: ICD-10-CM

## 2021-10-27 DIAGNOSIS — R63.0 POOR APPETITE: ICD-10-CM

## 2021-10-27 PROCEDURE — 3079F DIAST BP 80-89 MM HG: CPT | Performed by: PHYSICIAN ASSISTANT

## 2021-10-27 PROCEDURE — 99214 OFFICE O/P EST MOD 30 MIN: CPT | Performed by: PHYSICIAN ASSISTANT

## 2021-10-27 PROCEDURE — 3074F SYST BP LT 130 MM HG: CPT | Performed by: PHYSICIAN ASSISTANT

## 2021-10-27 RX ORDER — GABAPENTIN 300 MG/1
300 CAPSULE ORAL
Qty: 90 CAPSULE | Refills: 1 | Status: SHIPPED | OUTPATIENT
Start: 2021-10-27 | End: 2022-06-16 | Stop reason: DRUGHIGH

## 2021-10-27 RX ORDER — CYPROHEPTADINE HYDROCHLORIDE 4 MG/1
4 TABLET ORAL 2 TIMES DAILY
Qty: 180 TABLET | Refills: 1 | Status: SHIPPED | OUTPATIENT
Start: 2021-10-27 | End: 2022-06-16 | Stop reason: SDUPTHER

## 2021-10-27 RX ORDER — CYCLOBENZAPRINE HCL 10 MG
10 TABLET ORAL 2 TIMES DAILY
Qty: 30 TABLET | Refills: 1 | Status: SHIPPED | OUTPATIENT
Start: 2021-10-27 | End: 2022-06-16 | Stop reason: SDUPTHER

## 2021-10-27 RX ORDER — LOSARTAN POTASSIUM 100 MG/1
100 TABLET ORAL DAILY
Qty: 90 TABLET | Refills: 1 | Status: SHIPPED | OUTPATIENT
Start: 2021-10-27 | End: 2022-06-16 | Stop reason: SDUPTHER

## 2021-10-27 RX ORDER — METOPROLOL SUCCINATE 100 MG/1
100 TABLET, EXTENDED RELEASE ORAL DAILY
Qty: 90 TABLET | Refills: 1 | Status: SHIPPED | OUTPATIENT
Start: 2021-10-27 | End: 2022-06-16 | Stop reason: SDUPTHER

## 2021-10-27 RX ORDER — AMLODIPINE BESYLATE 10 MG/1
10 TABLET ORAL DAILY
Qty: 90 TABLET | Refills: 1 | Status: SHIPPED | OUTPATIENT
Start: 2021-10-27 | End: 2022-06-16 | Stop reason: SDUPTHER

## 2021-11-02 ENCOUNTER — TELEPHONE (OUTPATIENT)
Dept: ADMINISTRATIVE | Facility: OTHER | Age: 61
End: 2021-11-02

## 2022-04-25 ENCOUNTER — VBI (OUTPATIENT)
Dept: ADMINISTRATIVE | Facility: OTHER | Age: 62
End: 2022-04-25

## 2022-06-16 ENCOUNTER — OFFICE VISIT (OUTPATIENT)
Dept: FAMILY MEDICINE CLINIC | Facility: CLINIC | Age: 62
End: 2022-06-16

## 2022-06-16 VITALS
OXYGEN SATURATION: 98 % | TEMPERATURE: 96.7 F | DIASTOLIC BLOOD PRESSURE: 88 MMHG | RESPIRATION RATE: 18 BRPM | SYSTOLIC BLOOD PRESSURE: 148 MMHG | WEIGHT: 111.4 LBS | BODY MASS INDEX: 21.03 KG/M2 | HEART RATE: 62 BPM | HEIGHT: 61 IN

## 2022-06-16 DIAGNOSIS — I10 ESSENTIAL HYPERTENSION: ICD-10-CM

## 2022-06-16 DIAGNOSIS — R63.0 POOR APPETITE: ICD-10-CM

## 2022-06-16 DIAGNOSIS — Z13.31 DEPRESSION SCREEN: ICD-10-CM

## 2022-06-16 DIAGNOSIS — M62.838 TRAPEZIUS MUSCLE SPASM: ICD-10-CM

## 2022-06-16 DIAGNOSIS — G56.03 BILATERAL CARPAL TUNNEL SYNDROME: ICD-10-CM

## 2022-06-16 DIAGNOSIS — Z12.11 ENCOUNTER FOR SCREENING COLONOSCOPY: ICD-10-CM

## 2022-06-16 DIAGNOSIS — G62.9 NEUROPATHY: Primary | ICD-10-CM

## 2022-06-16 DIAGNOSIS — M54.2 NECK PAIN: ICD-10-CM

## 2022-06-16 DIAGNOSIS — R63.6 UNDERWEIGHT: ICD-10-CM

## 2022-06-16 PROCEDURE — 3079F DIAST BP 80-89 MM HG: CPT | Performed by: PHYSICIAN ASSISTANT

## 2022-06-16 PROCEDURE — 99215 OFFICE O/P EST HI 40 MIN: CPT | Performed by: PHYSICIAN ASSISTANT

## 2022-06-16 PROCEDURE — 3077F SYST BP >= 140 MM HG: CPT | Performed by: PHYSICIAN ASSISTANT

## 2022-06-16 RX ORDER — CYPROHEPTADINE HYDROCHLORIDE 4 MG/1
4 TABLET ORAL 2 TIMES DAILY
Qty: 180 TABLET | Refills: 1 | Status: SHIPPED | OUTPATIENT
Start: 2022-06-16

## 2022-06-16 RX ORDER — GABAPENTIN 600 MG/1
600 TABLET ORAL
Qty: 90 TABLET | Refills: 1 | Status: SHIPPED | OUTPATIENT
Start: 2022-06-16

## 2022-06-16 RX ORDER — METOPROLOL SUCCINATE 100 MG/1
100 TABLET, EXTENDED RELEASE ORAL DAILY
Qty: 90 TABLET | Refills: 1 | Status: SHIPPED | OUTPATIENT
Start: 2022-06-16

## 2022-06-16 RX ORDER — CYCLOBENZAPRINE HCL 10 MG
10 TABLET ORAL 2 TIMES DAILY
Qty: 30 TABLET | Refills: 1 | Status: SHIPPED | OUTPATIENT
Start: 2022-06-16

## 2022-06-16 RX ORDER — IBUPROFEN 400 MG/1
400 TABLET ORAL EVERY 8 HOURS SCHEDULED
Qty: 30 TABLET | Refills: 1 | Status: SHIPPED | OUTPATIENT
Start: 2022-06-16

## 2022-06-16 RX ORDER — AMLODIPINE BESYLATE 10 MG/1
10 TABLET ORAL DAILY
Qty: 90 TABLET | Refills: 1 | Status: SHIPPED | OUTPATIENT
Start: 2022-06-16

## 2022-06-16 RX ORDER — LOSARTAN POTASSIUM 100 MG/1
100 TABLET ORAL DAILY
Qty: 90 TABLET | Refills: 1 | Status: SHIPPED | OUTPATIENT
Start: 2022-06-16

## 2022-06-16 NOTE — PROGRESS NOTES
Assessment/Plan:    Essential hypertension  - Continue amlodipine 10 mg daily, losartan 100 mg daily, and metoprolol succinate 100 mg daily  - Currently blood pressure is controlled at this time  Reviewed BP target goal with patient  - Continue to maintain healthy balanced diet with focus on low salt intake  Limit alcohol intake  - Advised to exercise at least 30 minutes a day for at least 5 days out of the week  Neck pain  - Will refill Flexeril 10 mg, to be taken twice daily as needed  - Will refill Motrin 400 mg, to be taken every 8 hours as needed  - Advised to apply ice/heating pad as needed to affected area  - Continue daily exercises and stretches  Underweight/poor appetite  - Patient has good appetite when taking cyproheptadine   Weight has been stable and BMI is within normal range  Body mass index is 21 05 kg/m²  - Advised to continue taking cyproheptadine 4 mg twice daily   Continue drinking Ensure as needed   Will continue to monitor  Wt Readings from Last 3 Encounters:   06/16/22 50 5 kg (111 lb 6 4 oz)   10/27/21 51 3 kg (113 lb)   06/25/21 50 3 kg (110 lb 12 8 oz)     Neuropathy/  Bilateral carpal tunnel syndrome  -  Stable and much improved since starting gabapentin and wearing wrist splints  - Continue nighttime wrist splints  - Will increase gabapentin from 300 to 600 mg daily bedtime  Diagnoses and all orders for this visit:    Neuropathy  -     gabapentin (Neurontin) 600 MG tablet; Take 1 tablet (600 mg total) by mouth daily at bedtime    Essential hypertension  -     losartan (COZAAR) 100 MG tablet; Take 1 tablet (100 mg total) by mouth daily  -     metoprolol succinate (TOPROL-XL) 100 mg 24 hr tablet; Take 1 tablet (100 mg total) by mouth daily  -     amLODIPine (NORVASC) 10 mg tablet; Take 1 tablet (10 mg total) by mouth daily    Neck pain  -     ibuprofen (MOTRIN) 400 mg tablet;  Take 1 tablet (400 mg total) by mouth every 8 (eight) hours  -     cyclobenzaprine (FLEXERIL) 10 mg tablet; Take 1 tablet (10 mg total) by mouth 2 (two) times a day    Trapezius muscle spasm  -     ibuprofen (MOTRIN) 400 mg tablet; Take 1 tablet (400 mg total) by mouth every 8 (eight) hours  -     cyclobenzaprine (FLEXERIL) 10 mg tablet; Take 1 tablet (10 mg total) by mouth 2 (two) times a day    Poor appetite  -     cyproheptadine (PERIACTIN) 4 mg tablet; Take 1 tablet (4 mg total) by mouth 2 (two) times a day    Encounter for screening colonoscopy  -     Ambulatory referral for colonoscopy; Future    Underweight    Bilateral carpal tunnel syndrome    Depression screen          All of patients questions were answered  Patient understands and agrees with the above plan  Return in about 3 months (around 9/16/2022) for Annual physical     Brisa Gray  06/16/22  [address] [facility] [place]          Subjective:     Patient ID: Laurent Giraldo  is a 63 y o  female with known PHM of hypertension, neuropathy, bilateral carpal tunnel syndrome who presents today in office for hypertension follow up  - Patient is a 63 y o  female who presents today for hypertension follow up  Patient notes overall her neuropathy has been stable, but she has noticed taking 2 tablets of gabapentin 300 mg has been helping more than just 1 tablet  Patient notes her left-sided neck pain is still present and she would like a refill of the Flexeril and ibuprofen  Patient is currently her blood pressure has been slightly elevated because she ran out of amlodipine 1 week ago  Patient denies any headaches, dizziness, chest pain  Patient notes she has been taking the cyproheptadine once a day since it does make her sleepy, but it does continue to help increase her appetite        The following portions of the patient's history were reviewed and updated as appropriate: allergies, current medications, past family history, past medical history, past social history, past surgical history and problem list         Review of Systems   Constitutional: Negative for chills, fatigue and fever  HENT: Negative for congestion, ear pain, rhinorrhea and sore throat  Eyes: Negative for pain and visual disturbance  Respiratory: Negative for cough, chest tightness and shortness of breath  Cardiovascular: Negative for chest pain and palpitations  Gastrointestinal: Negative for abdominal pain, blood in stool, constipation, diarrhea, nausea and vomiting  Genitourinary: Negative for difficulty urinating and dysuria  Musculoskeletal: Positive for neck pain  Negative for arthralgias  Skin: Negative for rash  Neurological: Positive for numbness  Negative for dizziness and headaches  Psychiatric/Behavioral: Negative for behavioral problems  The patient is not nervous/anxious  Objective:   Vitals:    06/16/22 0944   BP: 148/88   BP Location: Left arm   Patient Position: Sitting   Cuff Size: Standard   Pulse: 62   Resp: 18   Temp: (!) 96 7 °F (35 9 °C)   TempSrc: Temporal   SpO2: 98%   Weight: 50 5 kg (111 lb 6 4 oz)   Height: 5' 1" (1 549 m)         Physical Exam  Vitals and nursing note reviewed  Constitutional:       General: She is not in acute distress  Appearance: She is well-developed  HENT:      Head: Normocephalic and atraumatic  Right Ear: External ear normal       Left Ear: External ear normal       Nose: Nose normal    Eyes:      Conjunctiva/sclera: Conjunctivae normal    Cardiovascular:      Rate and Rhythm: Normal rate and regular rhythm  Pulses: Normal pulses  Heart sounds: Normal heart sounds  Pulmonary:      Effort: Pulmonary effort is normal  No respiratory distress  Breath sounds: Normal breath sounds  No wheezing  Musculoskeletal:      Cervical back: Normal range of motion and neck supple  Skin:     General: Skin is warm and dry  Neurological:      Mental Status: She is alert and oriented to person, place, and time     Psychiatric:         Behavior: Behavior normal            PHQ-2/9 Depression Screening    Little interest or pleasure in doing things: 0 - not at all  Feeling down, depressed, or hopeless: 0 - not at all  PHQ-2 Score: 0  PHQ-2 Interpretation: Negative depression screen       - Utilized [facility] services for translation

## 2022-06-19 PROBLEM — M54.2 NECK PAIN: Status: ACTIVE | Noted: 2022-06-19

## 2022-06-20 NOTE — ASSESSMENT & PLAN NOTE
- Continue amlodipine 10 mg daily, losartan 100 mg daily, and metoprolol succinate 100 mg daily  - Currently blood pressure is controlled at this time  Reviewed BP target goal with patient  - Continue to maintain healthy balanced diet with focus on low salt intake  Limit alcohol intake  - Advised to exercise at least 30 minutes a day for at least 5 days out of the week

## 2022-06-20 NOTE — ASSESSMENT & PLAN NOTE
- Will refill Flexeril 10 mg, to be taken twice daily as needed  - Will refill Motrin 400 mg, to be taken every 8 hours as needed  - Advised to apply ice/heating pad as needed to affected area  - Continue daily exercises and stretches

## 2022-06-27 ENCOUNTER — TELEPHONE (OUTPATIENT)
Dept: GASTROENTEROLOGY | Facility: CLINIC | Age: 62
End: 2022-06-27

## 2022-06-27 NOTE — TELEPHONE ENCOUNTER
Patients GI provider:  Passed [facility]    Number to return call: ([phone] or [phone])    Reason for call:  calling to schedule Colonoscopy  Please call to schedule  Thank you!     Scheduled procedure/appointment date if applicable: Apt/procedure

## 2022-07-01 NOTE — TELEPHONE ENCOUNTER
Called 06/29/2022 @ 3:37 pm and 07/01/2022 9:34 am [phone] to schedule OA colonoscopy, left message  Note: [phone] msg states not receiving messages at this time

## 2022-08-29 ENCOUNTER — APPOINTMENT (OUTPATIENT)
Dept: LAB | Facility: CLINIC | Age: 62
End: 2022-08-29
Payer: COMMERCIAL

## 2022-08-29 DIAGNOSIS — Z11.1 SCREENING FOR TUBERCULOSIS: ICD-10-CM

## 2022-08-29 DIAGNOSIS — Z11.1 SCREENING FOR TUBERCULOSIS: Primary | ICD-10-CM

## 2022-08-29 PROCEDURE — 86480 TB TEST CELL IMMUN MEASURE: CPT

## 2022-08-29 PROCEDURE — 36415 COLL VENOUS BLD VENIPUNCTURE: CPT

## 2022-08-31 LAB
GAMMA INTERFERON BACKGROUND BLD IA-ACNC: 0.03 IU/ML
M TB IFN-G BLD-IMP: NEGATIVE
M TB IFN-G CD4+ BCKGRND COR BLD-ACNC: -0.01 IU/ML
M TB IFN-G CD4+ BCKGRND COR BLD-ACNC: 0.01 IU/ML
MITOGEN IGNF BCKGRD COR BLD-ACNC: 1.2 IU/ML

## 2022-09-29 ENCOUNTER — VBI (OUTPATIENT)
Dept: ADMINISTRATIVE | Facility: OTHER | Age: 62
End: 2022-09-29

## 2022-11-29 ENCOUNTER — HOSPITAL ENCOUNTER (OUTPATIENT)
Dept: MAMMOGRAPHY | Facility: CLINIC | Age: 62
Discharge: HOME | End: 2022-11-29

## 2022-11-29 VITALS — BODY MASS INDEX: 20.96 KG/M2 | HEIGHT: 61 IN | WEIGHT: 111 LBS

## 2022-11-29 DIAGNOSIS — N64.89 BREAST ASYMMETRY: ICD-10-CM

## 2023-01-31 ENCOUNTER — OFFICE VISIT (OUTPATIENT)
Dept: FAMILY MEDICINE CLINIC | Facility: CLINIC | Age: 63
End: 2023-01-31

## 2023-01-31 VITALS
SYSTOLIC BLOOD PRESSURE: 140 MMHG | HEIGHT: 61 IN | OXYGEN SATURATION: 98 % | HEART RATE: 88 BPM | RESPIRATION RATE: 18 BRPM | TEMPERATURE: 97.9 F | BODY MASS INDEX: 21.96 KG/M2 | WEIGHT: 116.3 LBS | DIASTOLIC BLOOD PRESSURE: 80 MMHG

## 2023-01-31 DIAGNOSIS — I10 ESSENTIAL HYPERTENSION: Primary | ICD-10-CM

## 2023-01-31 DIAGNOSIS — R63.6 UNDERWEIGHT: ICD-10-CM

## 2023-01-31 DIAGNOSIS — M54.2 NECK PAIN: ICD-10-CM

## 2023-01-31 DIAGNOSIS — G56.03 BILATERAL CARPAL TUNNEL SYNDROME: ICD-10-CM

## 2023-01-31 DIAGNOSIS — Z23 ENCOUNTER FOR IMMUNIZATION: ICD-10-CM

## 2023-01-31 DIAGNOSIS — Z12.11 COLON CANCER SCREENING: ICD-10-CM

## 2023-01-31 DIAGNOSIS — M62.838 TRAPEZIUS MUSCLE SPASM: ICD-10-CM

## 2023-01-31 DIAGNOSIS — G62.9 NEUROPATHY: ICD-10-CM

## 2023-01-31 DIAGNOSIS — R06.02 SHORTNESS OF BREATH: ICD-10-CM

## 2023-01-31 DIAGNOSIS — R63.0 POOR APPETITE: ICD-10-CM

## 2023-01-31 DIAGNOSIS — Z13.31 DEPRESSION SCREEN: ICD-10-CM

## 2023-01-31 RX ORDER — METOPROLOL SUCCINATE 100 MG/1
100 TABLET, EXTENDED RELEASE ORAL DAILY
Qty: 90 TABLET | Refills: 1 | Status: SHIPPED | OUTPATIENT
Start: 2023-01-31

## 2023-01-31 RX ORDER — ALBUTEROL SULFATE 90 UG/1
2 AEROSOL, METERED RESPIRATORY (INHALATION) EVERY 6 HOURS PRN
Qty: 8 G | Refills: 2 | Status: SHIPPED | OUTPATIENT
Start: 2023-01-31

## 2023-01-31 RX ORDER — GABAPENTIN 600 MG/1
600 TABLET ORAL
Qty: 90 TABLET | Refills: 1 | Status: SHIPPED | OUTPATIENT
Start: 2023-01-31

## 2023-01-31 RX ORDER — CYCLOBENZAPRINE HCL 10 MG
10 TABLET ORAL 2 TIMES DAILY
Qty: 30 TABLET | Refills: 1 | Status: SHIPPED | OUTPATIENT
Start: 2023-01-31

## 2023-01-31 RX ORDER — IBUPROFEN 400 MG/1
400 TABLET ORAL EVERY 8 HOURS SCHEDULED
Qty: 30 TABLET | Refills: 1 | Status: SHIPPED | OUTPATIENT
Start: 2023-01-31

## 2023-01-31 RX ORDER — CYPROHEPTADINE HYDROCHLORIDE 4 MG/1
4 TABLET ORAL 2 TIMES DAILY
Qty: 180 TABLET | Refills: 1 | Status: SHIPPED | OUTPATIENT
Start: 2023-01-31

## 2023-01-31 RX ORDER — AMLODIPINE BESYLATE 10 MG/1
10 TABLET ORAL DAILY
Qty: 90 TABLET | Refills: 1 | Status: SHIPPED | OUTPATIENT
Start: 2023-01-31

## 2023-01-31 RX ORDER — LOSARTAN POTASSIUM 100 MG/1
100 TABLET ORAL DAILY
Qty: 90 TABLET | Refills: 1 | Status: SHIPPED | OUTPATIENT
Start: 2023-01-31

## 2023-01-31 NOTE — PROGRESS NOTES
Assessment/Plan:  -Patient did wish to receive flu shot today, however currently out of stock in the office  Encourage patient to schedule nurse visit next week to receive flu shot  Essential hypertension  - Continue amlodipine 10 mg daily, losartan 100 mg daily, and metoprolol succinate 100 mg daily  - Currently blood pressure is controlled at this time  Reviewed BP target goal with patient  - Continue to maintain healthy balanced diet with focus on low salt intake  Limit alcohol intake  - Advised to exercise at least 30 minutes a day for at least 5 days out of the week  Neck pain  - Will refill Flexeril 10 mg, to be taken twice daily as needed  - Will refill Motrin 400 mg, to be taken every 8 hours as needed  - Advised to apply ice/heating pad as needed to affected area  - Continue daily exercises and stretches      Underweight/poor appetite  - Patient has good appetite when taking cyproheptadine   Weight has been stable and BMI is within normal range  Body mass index is 21 97 kg/m²  - Advised to continue taking cyproheptadine 4 mg twice daily   Continue drinking Ensure as needed   Will continue to monitor  Wt Readings from Last 3 Encounters:   01/31/23 52 8 kg (116 lb 4 8 oz)   11/29/22 50 3 kg (111 lb)   06/16/22 50 5 kg (111 lb 6 4 oz)     Neuropathy/  Bilateral carpal tunnel syndrome  -  Stable and much improved since starting gabapentin and wearing wrist splints  - Continue nighttime wrist splints  - Continue gabapentin 600 mg daily bedtime  Diagnoses and all orders for this visit:    Essential hypertension  -     amLODIPine (NORVASC) 10 mg tablet; Take 1 tablet (10 mg total) by mouth daily  -     losartan (COZAAR) 100 MG tablet; Take 1 tablet (100 mg total) by mouth daily  -     metoprolol succinate (TOPROL-XL) 100 mg 24 hr tablet; Take 1 tablet (100 mg total) by mouth daily  -     CBC and differential; Future  -     Comprehensive metabolic panel;  Future  -     Lipid panel; Future  -     Hemoglobin A1C; Future    Neck pain  -     cyclobenzaprine (FLEXERIL) 10 mg tablet; Take 1 tablet (10 mg total) by mouth 2 (two) times a day  -     ibuprofen (MOTRIN) 400 mg tablet; Take 1 tablet (400 mg total) by mouth every 8 (eight) hours    Trapezius muscle spasm  -     cyclobenzaprine (FLEXERIL) 10 mg tablet; Take 1 tablet (10 mg total) by mouth 2 (two) times a day  -     ibuprofen (MOTRIN) 400 mg tablet; Take 1 tablet (400 mg total) by mouth every 8 (eight) hours    Neuropathy  -     gabapentin (Neurontin) 600 MG tablet; Take 1 tablet (600 mg total) by mouth daily at bedtime    Poor appetite  -     cyproheptadine (PERIACTIN) 4 mg tablet; Take 1 tablet (4 mg total) by mouth 2 (two) times a day    Colon cancer screening  -     Ambulatory referral for colonoscopy; Future    Encounter for immunization  -     Cancel: influenza vaccine, quadrivalent, recombinant, PF, 0 5 mL, for patients 18 yr+ (FLUBLOK)    Shortness of breath  -     albuterol (Ventolin HFA) 90 mcg/act inhaler; Inhale 2 puffs every 6 (six) hours as needed for wheezing or shortness of breath    Bilateral carpal tunnel syndrome    Underweight    Depression screen        All of patients questions were answered  Patient understands and agrees with the above plan  Return in about 6 months (around 7/31/2023) for Annual physical     Catalina Leonoray  01/31/23  [facility] VERÓNICA Dolan          Subjective:     Patient ID: Urszula Pérez  is a 63 y o  female with known PMH of hypertension, neck pain, poor appetite, bilateral carpal tunnel syndrome, underweight who presents today in office for hypertension follow-up  - Patient is a 63 y o  female who presents today for hypertension follow-up  Patient notes her neck pain has been stable  Patient denies any headaches, dizziness, chest pain, lower leg swelling  Patient notes the gabapentin has been working well for the numbness of her hands    Patient is here a day and very good while continuing to take cyproheptadine  Patient notes she has been able to gain a few more pounds which she is happy about  The following portions of the patient's history were reviewed and updated as appropriate: allergies, current medications, past family history, past medical history, past social history, past surgical history and problem list         Review of Systems   Constitutional: Negative for chills, fatigue and fever  HENT: Negative for congestion, ear pain, rhinorrhea and sore throat  Eyes: Negative for pain and visual disturbance  Respiratory: Negative for cough, chest tightness and shortness of breath  Cardiovascular: Negative for chest pain and palpitations  Gastrointestinal: Negative for abdominal pain, blood in stool, constipation, diarrhea, nausea and vomiting  Genitourinary: Negative for difficulty urinating and dysuria  Musculoskeletal: Positive for neck pain (Improved)  Negative for arthralgias  Skin: Negative for rash  Neurological: Positive for numbness (Improved)  Negative for dizziness and headaches  Psychiatric/Behavioral: Negative for behavioral problems  The patient is not nervous/anxious  Depression Screening and Follow-up Plan: Patient was screened for depression during today's encounter  They screened negative with a PHQ-2 score of 0  Objective:   Vitals:    01/31/23 1455   BP: 140/80   BP Location: Left arm   Patient Position: Sitting   Cuff Size: Standard   Pulse: 88   Resp: 18   Temp: 97 9 °F (36 6 °C)   TempSrc: Temporal   SpO2: 98%   Weight: 52 8 kg (116 lb 4 8 oz)   Height: 5' 1" (1 549 m)         Physical Exam  Vitals and nursing note reviewed  Constitutional:       General: She is not in acute distress  Appearance: She is well-developed  HENT:      Head: Normocephalic and atraumatic        Right Ear: External ear normal       Left Ear: External ear normal       Nose: Nose normal    Eyes:      Conjunctiva/sclera: Conjunctivae normal    Cardiovascular:      Rate and Rhythm: Normal rate and regular rhythm  Pulses: Normal pulses  Heart sounds: Normal heart sounds  Pulmonary:      Effort: Pulmonary effort is normal  No respiratory distress  Breath sounds: Normal breath sounds  No wheezing  Musculoskeletal:      Cervical back: Normal range of motion and neck supple  Right lower leg: No edema  Left lower leg: No edema  Skin:     General: Skin is warm and dry  Neurological:      Mental Status: She is alert and oriented to person, place, and time  Psychiatric:         Behavior: Behavior normal            PHQ-2/9 Depression Screening    Little interest or pleasure in doing things: 0 - not at all  Feeling down, depressed, or hopeless: 0 - not at all  PHQ-2 Score: 0  PHQ-2 Interpretation: Negative depression screen       - Utilized [employer] services for translation

## 2023-02-17 ENCOUNTER — VBI (OUTPATIENT)
Dept: ADMINISTRATIVE | Facility: OTHER | Age: 63
End: 2023-02-17

## 2023-03-01 ENCOUNTER — VBI (OUTPATIENT)
Dept: ADMINISTRATIVE | Facility: OTHER | Age: 63
End: 2023-03-01

## 2023-03-02 ENCOUNTER — VBI (OUTPATIENT)
Dept: ADMINISTRATIVE | Facility: OTHER | Age: 63
End: 2023-03-02

## 2023-03-03 ENCOUNTER — TELEPHONE (OUTPATIENT)
Dept: GASTROENTEROLOGY | Facility: CLINIC | Age: 63
End: 2023-03-03

## 2023-03-03 ENCOUNTER — PREP FOR PROCEDURE (OUTPATIENT)
Dept: GASTROENTEROLOGY | Facility: CLINIC | Age: 63
End: 2023-03-03

## 2023-03-03 DIAGNOSIS — Z12.11 COLON CANCER SCREENING: Primary | ICD-10-CM

## 2023-03-03 NOTE — TELEPHONE ENCOUNTER
Scheduled date of colonoscopy (as of today): 4/17/23  Physician performing colonoscopy: Dr Shukla  Location of colonoscopy:  Bowel prep reviewed with patient:Kenzie  Instructions reviewed with patient by:Magi/vick  Clearances: N/A          02/01/23  Screened by: Naren Johnson     Referring Provider Nate Law     Pre- Screening:      Body mass index is 21 97 kg/m²  Has patient been referred for a routine screening Colonoscopy? no  Is the patient between 39-70 years old? yes        Previous Colonoscopy no   If yes:    Date:     Facility:     Reason:         SCHEDULING STAFF: If the patient is between 45yrs-49yrs, please advise patient to confirm benefits/coverage with their insurance company for a routine screening colonoscopy, some insurance carriers will only cover at [address] or older  If the patient is over 66years old, please schedule an office visit       Does the patient want to see a Gastroenterologist prior to their procedure OR are they having any GI symptoms? no     Has the patient been hospitalized or had abdominal surgery in the past 6 months? no     Does the patient use supplemental oxygen? no     Does the patient take Coumadin, Lovenox, Plavix, Elliquis, Xarelto, or other blood thinning medication? no     Has the patient had a stroke, cardiac event, or stent placed in the past year? no     SCHEDULING STAFF: If patient answers NO to above questions, then schedule procedure  If patient answers YES to above questions, then schedule office appointment       If patient is between 45yrs - 49yrs, please advise patient that we will have to confirm benefits & coverage with their insurance company for a routine screening colonoscopy

## 2023-03-07 NOTE — PROGRESS NOTES
There is a 13 mm x 6 mm x 13 mm mass features suggestive of fibroadenoma  Recommend continued short interval six-month follow-up ultrasound and mammogram to confirm stability of this finding  The order for ultrasound and diagnostic mammogram in 6 months placed 
74

## 2023-04-05 ENCOUNTER — OFFICE VISIT (OUTPATIENT)
Dept: FAMILY MEDICINE CLINIC | Facility: CLINIC | Age: 63
End: 2023-04-05

## 2023-04-05 VITALS
TEMPERATURE: 98.7 F | WEIGHT: 119 LBS | HEIGHT: 61 IN | DIASTOLIC BLOOD PRESSURE: 94 MMHG | BODY MASS INDEX: 22.47 KG/M2 | OXYGEN SATURATION: 99 % | HEART RATE: 78 BPM | RESPIRATION RATE: 18 BRPM | SYSTOLIC BLOOD PRESSURE: 142 MMHG

## 2023-04-05 DIAGNOSIS — I10 ESSENTIAL HYPERTENSION: ICD-10-CM

## 2023-04-05 DIAGNOSIS — G62.9 NEUROPATHY: ICD-10-CM

## 2023-04-05 DIAGNOSIS — M62.838 TRAPEZIUS MUSCLE SPASM: ICD-10-CM

## 2023-04-05 DIAGNOSIS — R63.0 POOR APPETITE: ICD-10-CM

## 2023-04-05 DIAGNOSIS — M54.2 NECK PAIN: ICD-10-CM

## 2023-04-05 DIAGNOSIS — R06.02 SHORTNESS OF BREATH: ICD-10-CM

## 2023-04-05 DIAGNOSIS — G56.03 BILATERAL CARPAL TUNNEL SYNDROME: Chronic | ICD-10-CM

## 2023-04-05 DIAGNOSIS — R63.6 UNDERWEIGHT: Chronic | ICD-10-CM

## 2023-04-05 DIAGNOSIS — Z00.00 ANNUAL PHYSICAL EXAM: Primary | ICD-10-CM

## 2023-04-05 DIAGNOSIS — Z13.31 DEPRESSION SCREEN: ICD-10-CM

## 2023-04-05 RX ORDER — METOPROLOL SUCCINATE 100 MG/1
100 TABLET, EXTENDED RELEASE ORAL DAILY
Qty: 90 TABLET | Refills: 1 | Status: SHIPPED | OUTPATIENT
Start: 2023-04-05

## 2023-04-05 RX ORDER — CYCLOBENZAPRINE HCL 10 MG
10 TABLET ORAL 2 TIMES DAILY
Qty: 30 TABLET | Refills: 1 | Status: SHIPPED | OUTPATIENT
Start: 2023-04-05

## 2023-04-05 RX ORDER — CYPROHEPTADINE HYDROCHLORIDE 4 MG/1
4 TABLET ORAL 2 TIMES DAILY
Qty: 180 TABLET | Refills: 1 | Status: SHIPPED | OUTPATIENT
Start: 2023-04-05

## 2023-04-05 RX ORDER — AMLODIPINE BESYLATE 10 MG/1
10 TABLET ORAL DAILY
Qty: 90 TABLET | Refills: 1 | Status: SHIPPED | OUTPATIENT
Start: 2023-04-05

## 2023-04-05 RX ORDER — ALBUTEROL SULFATE 90 UG/1
2 AEROSOL, METERED RESPIRATORY (INHALATION) EVERY 6 HOURS PRN
Qty: 8 G | Refills: 2 | Status: SHIPPED | OUTPATIENT
Start: 2023-04-05

## 2023-04-05 RX ORDER — LOSARTAN POTASSIUM 100 MG/1
100 TABLET ORAL DAILY
Qty: 90 TABLET | Refills: 1 | Status: SHIPPED | OUTPATIENT
Start: 2023-04-05

## 2023-04-05 RX ORDER — GABAPENTIN 600 MG/1
600 TABLET ORAL
Qty: 90 TABLET | Refills: 1 | Status: SHIPPED | OUTPATIENT
Start: 2023-04-05

## 2023-04-05 RX ORDER — IBUPROFEN 400 MG/1
400 TABLET ORAL EVERY 8 HOURS SCHEDULED
Qty: 30 TABLET | Refills: 1 | Status: SHIPPED | OUTPATIENT
Start: 2023-04-05

## 2023-04-05 NOTE — PROGRESS NOTES
[address] [facility]    NAME: Lillie Spurling  AGE: 63 y o  SEX: female  : 1960     DATE: 2023     Assessment and Plan:     Problem List Items Addressed This Visit        Cardiovascular and Mediastinum    Essential hypertension (Chronic)     - Continue amlodipine 10 mg daily, losartan 100 mg daily, and metoprolol succinate 100 mg daily  - Currently blood pressure is controlled at this time  Reviewed BP target goal with patient  - Continue to maintain healthy balanced diet with focus on low salt intake  Limit alcohol intake  - Advised to exercise at least 30 minutes a day for at least 5 days out of the week  Relevant Medications    amLODIPine (NORVASC) 10 mg tablet    losartan (COZAAR) 100 MG tablet    metoprolol succinate (TOPROL-XL) 100 mg 24 hr tablet       Nervous and Auditory    Neuropathy (Chronic)    Relevant Medications    gabapentin (Neurontin) 600 MG tablet    Bilateral carpal tunnel syndrome (Chronic)       Other    Underweight (Chronic)    Poor appetite (Chronic)    Relevant Medications    cyproheptadine (PERIACTIN) 4 mg tablet    Neck pain (Chronic)     - Stable  Continue Flexeril 10 mg, to be taken twice daily as needed and Motrin 400 mg, to be taken every 8 hours as needed  - Advised to apply ice/heating pad as needed to affected area  - Continue daily exercises and stretches           Relevant Medications    ibuprofen (MOTRIN) 400 mg tablet    cyclobenzaprine (FLEXERIL) 10 mg tablet   Other Visit Diagnoses     Annual physical exam    -  Primary    Depression screen        Trapezius muscle spasm        Relevant Medications    ibuprofen (MOTRIN) 400 mg tablet    cyclobenzaprine (FLEXERIL) 10 mg tablet    Shortness of breath        Relevant Medications    albuterol (Ventolin HFA) 90 mcg/act inhaler           Underweight/poor appetite  - Patient has good appetite when taking cyproheptadine  Geovanny Vela has been stable and BMI is now within normal range  Body mass index is 22 48 kg/m²  - Patient wishes to continue with cyproheptadine  Continue drinking Ensure as needed   Will continue to monitor  Neuropathy/  Bilateral carpal tunnel syndrome  -  Stable and much improved since starting gabapentin and wearing wrist splints  - Continue nighttime wrist splints  - Continue gabapentin 600 mg daily bedtime  I have recommended that this patient have a tetanus booster, PAP but she declines at this time  I have discussed the risks and benefits of this examination with her  The patient verbalizes understanding  Immunizations and preventive care screenings were discussed with patient today  Appropriate education was printed on patient's after visit summary  Counseling:  Alcohol/drug use: discussed moderation in alcohol intake, the recommendations for healthy alcohol use, and avoidance of illicit drug use  Dental Health: discussed importance of regular tooth brushing, flossing, and dental visits  Injury prevention: discussed safety/seat belts, safety helmets, smoke detectors, carbon dioxide detectors, and smoking near bedding or upholstery  Sexual health: discussed sexually transmitted diseases, partner selection, use of condoms, avoidance of unintended pregnancy, and contraceptive alternatives  Exercise: the importance of regular exercise/physical activity was discussed  Recommend exercise 3-5 times per week for at least 30 minutes  Depression Screening and Follow-up Plan: Patient was screened for depression during today's encounter  They screened negative with a PHQ-2 score of 0  Return in about 6 months (around 10/5/2023) for Next scheduled follow up HTN       Chief Complaint:     Chief Complaint   Patient presents with   • Physical Exam      History of Present Illness:     Adult Annual Physical   Patient here for a comprehensive physical exam  The patient reports no problems  Diet and Physical Activity  Diet/Nutrition: well balanced diet  Exercise: walking and moderate cardiovascular exercise  Depression Screening  PHQ-2/9 Depression Screening    Little interest or pleasure in doing things: 0 - not at all  Feeling down, depressed, or hopeless: 0 - not at all  PHQ-2 Score: 0  PHQ-2 Interpretation: Negative depression screen       General Health  Sleep: sleeps well  Hearing: normal - bilateral   Vision: goes for regular eye exams and wears glasses  Dental: regular dental visits  /GYN Health  Last menstrual period: Postmenopausal  Contraceptive method: Postmenopausal   Last PAP: 2019  Results were normal, HPV negative  Review of Systems:     Review of Systems   Constitutional: Negative for chills, fatigue and fever  HENT: Negative for congestion, ear pain, rhinorrhea and sore throat  Eyes: Negative for pain and visual disturbance  Respiratory: Negative for cough, chest tightness and shortness of breath  Cardiovascular: Negative for chest pain and palpitations  Gastrointestinal: Negative for abdominal pain, blood in stool, constipation, diarrhea, nausea and vomiting  Genitourinary: Negative for difficulty urinating and dysuria  Musculoskeletal: Negative for arthralgias  Skin: Negative for rash  Neurological: Negative for dizziness and headaches  Psychiatric/Behavioral: Negative for behavioral problems  The patient is not nervous/anxious         Past Medical History:     Past Medical History:   Diagnosis Date   • Asthma    • History of transfusion    • Hypertension       Past Surgical History:     Past Surgical History:   Procedure Laterality Date   • BREAST BIOPSY Right     benign   •  SECTION     • SPINE SURGERY        Social History:     Social History     Socioeconomic History   • Marital status: Single     Spouse name: None   • Number of children: None   • Years of education: None   • Highest education level: None   Occupational History   • None   Tobacco Use   • Smoking status: Never   • Smokeless tobacco: Never   Substance and Sexual Activity   • Alcohol use: Yes   • Drug use: Never   • Sexual activity: Yes     Partners: Male   Other Topics Concern   • None   Social History Narrative   • None     Social Determinants of Health     Financial Resource Strain: Low Risk    • Difficulty of Paying Living Expenses: Not hard at all   Food Insecurity: No Food Insecurity   • Worried About Running Out of Food in the Last Year: Never true   • Ran Out of Food in the Last Year: Never true   Transportation Needs: No Transportation Needs   • Lack of Transportation (Medical): No   • Lack of Transportation (Non-Medical):  No   Physical Activity: Not on file   Stress: Not on file   Social Connections: Not on file   Intimate Partner Violence: Not on file   Housing Stability: Not on file      Family History:     Family History   Problem Relation Age of Onset   • Colon cancer Mother    • Cancer Mother    • No Known Problems Father    • Stomach cancer Sister    • No Known Problems Daughter    • No Known Problems Maternal Grandmother    • No Known Problems Maternal Grandfather    • No Known Problems Paternal Grandmother    • No Known Problems Paternal Grandfather    • Cancer Brother    • Stomach cancer Son       Current Medications:     Current Outpatient Medications   Medication Sig Dispense Refill   • albuterol (Ventolin HFA) 90 mcg/act inhaler Inhale 2 puffs every 6 (six) hours as needed for wheezing or shortness of breath 8 g 2   • amLODIPine (NORVASC) 10 mg tablet Take 1 tablet (10 mg total) by mouth daily 90 tablet 1   • cyclobenzaprine (FLEXERIL) 10 mg tablet Take 1 tablet (10 mg total) by mouth 2 (two) times a day 30 tablet 1   • cyproheptadine (PERIACTIN) 4 mg tablet Take 1 tablet (4 mg total) by mouth 2 (two) times a day 180 tablet 1   • gabapentin (Neurontin) 600 MG tablet Take 1 tablet (600 mg total) by mouth daily at bedtime 90 "tablet 1   • ibuprofen (MOTRIN) 400 mg tablet Take 1 tablet (400 mg total) by mouth every 8 (eight) hours 30 tablet 1   • losartan (COZAAR) 100 MG tablet Take 1 tablet (100 mg total) by mouth daily 90 tablet 1   • metoprolol succinate (TOPROL-XL) 100 mg 24 hr tablet Take 1 tablet (100 mg total) by mouth daily 90 tablet 1   • acetaminophen (TYLENOL) 650 mg CR tablet Take 1 tablet (650 mg total) by mouth every 8 (eight) hours as needed for mild pain 30 tablet 3   • polyethylene glycol (GOLYTELY) 4000 mL solution Take as directed by the office  4000 mL 0     No current facility-administered medications for this visit  Allergies: Allergies   Allergen Reactions   • Aspirin Swelling     Patient jaison toradol 15mg at ed   • Penicillins Swelling      Physical Exam:     /94 (BP Location: Left arm, Patient Position: Sitting, Cuff Size: Standard)   Pulse 78   Temp 98 7 °F (37 1 °C) (Temporal)   Resp 18   Ht 5' 1\" (1 549 m)   Wt 54 kg (119 lb)   SpO2 99%   BMI 22 48 kg/m²     Physical Exam  Vitals and nursing note reviewed  Constitutional:       General: She is not in acute distress  Appearance: She is well-developed  HENT:      Head: Normocephalic and atraumatic  Right Ear: External ear normal       Left Ear: External ear normal       Nose: Nose normal    Eyes:      Conjunctiva/sclera: Conjunctivae normal    Cardiovascular:      Rate and Rhythm: Normal rate and regular rhythm  Pulses: Normal pulses  Heart sounds: Normal heart sounds  Pulmonary:      Effort: Pulmonary effort is normal  No respiratory distress  Breath sounds: Normal breath sounds  No wheezing  Abdominal:      General: Bowel sounds are normal       Palpations: Abdomen is soft  Tenderness: There is no abdominal tenderness  Musculoskeletal:      Cervical back: Normal range of motion and neck supple  Skin:     General: Skin is warm and dry     Neurological:      Mental Status: She is alert and oriented to " person, place, and time     Psychiatric:         Behavior: Behavior normal          LALITO Holliday

## 2023-04-06 NOTE — ASSESSMENT & PLAN NOTE
- Stable  Continue Flexeril 10 mg, to be taken twice daily as needed and Motrin 400 mg, to be taken every 8 hours as needed  - Advised to apply ice/heating pad as needed to affected area  - Continue daily exercises and stretches

## 2023-04-16 RX ORDER — SODIUM CHLORIDE, SODIUM LACTATE, POTASSIUM CHLORIDE, CALCIUM CHLORIDE 600; 310; 30; 20 MG/100ML; MG/100ML; MG/100ML; MG/100ML
125 INJECTION, SOLUTION INTRAVENOUS CONTINUOUS
Status: CANCELLED | OUTPATIENT
Start: 2023-04-16

## 2023-07-11 ENCOUNTER — OFFICE VISIT (OUTPATIENT)
Dept: FAMILY MEDICINE CLINIC | Facility: CLINIC | Age: 63
End: 2023-07-11

## 2023-07-11 VITALS
SYSTOLIC BLOOD PRESSURE: 124 MMHG | HEIGHT: 63 IN | BODY MASS INDEX: 20.59 KG/M2 | HEART RATE: 93 BPM | RESPIRATION RATE: 18 BRPM | WEIGHT: 116.2 LBS | OXYGEN SATURATION: 99 % | TEMPERATURE: 97.8 F | DIASTOLIC BLOOD PRESSURE: 72 MMHG

## 2023-07-11 DIAGNOSIS — R63.0 POOR APPETITE: ICD-10-CM

## 2023-07-11 DIAGNOSIS — Z11.4 SCREENING FOR HIV (HUMAN IMMUNODEFICIENCY VIRUS): Primary | ICD-10-CM

## 2023-07-11 DIAGNOSIS — I10 ESSENTIAL HYPERTENSION: ICD-10-CM

## 2023-07-11 DIAGNOSIS — Z11.59 ENCOUNTER FOR HEPATITIS C SCREENING TEST FOR LOW RISK PATIENT: ICD-10-CM

## 2023-07-11 DIAGNOSIS — R06.02 SHORTNESS OF BREATH: ICD-10-CM

## 2023-07-11 DIAGNOSIS — R63.6 UNDERWEIGHT: Chronic | ICD-10-CM

## 2023-07-11 DIAGNOSIS — M54.2 NECK PAIN: ICD-10-CM

## 2023-07-11 DIAGNOSIS — G62.9 NEUROPATHY: ICD-10-CM

## 2023-07-11 DIAGNOSIS — M62.838 TRAPEZIUS MUSCLE SPASM: ICD-10-CM

## 2023-07-11 DIAGNOSIS — G56.03 BILATERAL CARPAL TUNNEL SYNDROME: Chronic | ICD-10-CM

## 2023-07-11 PROCEDURE — 3074F SYST BP LT 130 MM HG: CPT | Performed by: PHYSICIAN ASSISTANT

## 2023-07-11 PROCEDURE — 99214 OFFICE O/P EST MOD 30 MIN: CPT | Performed by: PHYSICIAN ASSISTANT

## 2023-07-11 PROCEDURE — 3078F DIAST BP <80 MM HG: CPT | Performed by: PHYSICIAN ASSISTANT

## 2023-07-11 RX ORDER — CYPROHEPTADINE HYDROCHLORIDE 4 MG/1
4 TABLET ORAL 2 TIMES DAILY
Qty: 180 TABLET | Refills: 1 | Status: SHIPPED | OUTPATIENT
Start: 2023-07-11

## 2023-07-11 RX ORDER — GABAPENTIN 600 MG/1
600 TABLET ORAL
Qty: 90 TABLET | Refills: 1 | Status: SHIPPED | OUTPATIENT
Start: 2023-07-11

## 2023-07-11 RX ORDER — AMLODIPINE BESYLATE 10 MG/1
10 TABLET ORAL DAILY
Qty: 90 TABLET | Refills: 1 | Status: SHIPPED | OUTPATIENT
Start: 2023-07-11

## 2023-07-11 RX ORDER — ALBUTEROL SULFATE 90 UG/1
2 AEROSOL, METERED RESPIRATORY (INHALATION) EVERY 6 HOURS PRN
Qty: 8 G | Refills: 2 | Status: SHIPPED | OUTPATIENT
Start: 2023-07-11

## 2023-07-11 RX ORDER — IBUPROFEN 400 MG/1
400 TABLET ORAL EVERY 8 HOURS SCHEDULED
Qty: 30 TABLET | Refills: 1 | Status: SHIPPED | OUTPATIENT
Start: 2023-07-11

## 2023-07-11 RX ORDER — LOSARTAN POTASSIUM 100 MG/1
100 TABLET ORAL DAILY
Qty: 90 TABLET | Refills: 1 | Status: SHIPPED | OUTPATIENT
Start: 2023-07-11

## 2023-07-11 RX ORDER — METOPROLOL SUCCINATE 100 MG/1
100 TABLET, EXTENDED RELEASE ORAL DAILY
Qty: 90 TABLET | Refills: 1 | Status: SHIPPED | OUTPATIENT
Start: 2023-07-11

## 2023-07-11 NOTE — ASSESSMENT & PLAN NOTE
- Stable. Continue Motrin 400 mg, to be taken every 8 hours as needed. - Advised to apply ice/heating pad as needed to affected area. - Continue daily exercises and stretches.

## 2023-07-11 NOTE — ASSESSMENT & PLAN NOTE
-  Stable and much improved since starting gabapentin and wearing wrist splints. - Continue nighttime wrist splints. - Continue gabapentin 600 mg daily bedtime.

## 2023-07-11 NOTE — ASSESSMENT & PLAN NOTE
- Continue amlodipine 10 mg daily, losartan 100 mg daily, and metoprolol succinate 100 mg daily. - Currently blood pressure is controlled at this time. Reviewed BP target goal with patient. - Continue to maintain healthy balanced diet with focus on low salt intake. Limit alcohol intake. - Advised to exercise at least 30 minutes a day for at least 5 days out of the week.

## 2023-07-11 NOTE — PROGRESS NOTES
Assessment/Plan:  -Advised patient to complete previously ordered labs at her earliest convenience. Essential hypertension  - Continue amlodipine 10 mg daily, losartan 100 mg daily, and metoprolol succinate 100 mg daily. - Currently blood pressure is controlled at this time. Reviewed BP target goal with patient. - Continue to maintain healthy balanced diet with focus on low salt intake. Limit alcohol intake. - Advised to exercise at least 30 minutes a day for at least 5 days out of the week. Neck pain  - Stable. Continue Motrin 400 mg, to be taken every 8 hours as needed. - Advised to apply ice/heating pad as needed to affected area. - Continue daily exercises and stretches. Underweight  - Patient has good appetite when taking cyproheptadine. Pan Hopes has been stable and BMI is now within normal range. Body mass index is 20.58 kg/m². - Patient wishes to continue with cyproheptadine. Continue drinking Ensure as needed.  Will continue to monitor. Wt Readings from Last 3 Encounters:   07/11/23 52.7 kg (116 lb 3.2 oz)   04/17/23 54 kg (119 lb)   04/05/23 54 kg (119 lb)         Poor appetite  - See plan for "underweight"    Bilateral carpal tunnel syndrome  -  Stable and much improved since starting gabapentin and wearing wrist splints. - Continue nighttime wrist splints. - Continue gabapentin 600 mg daily bedtime. Neuropathy  -  Stable and much improved since starting gabapentin and wearing wrist splints. - Continue nighttime wrist splints. - Continue gabapentin 600 mg daily bedtime. Diagnoses and all orders for this visit:    Screening for HIV (human immunodeficiency virus)  -     HIV 1/2 AB/AG w Reflex [facility] for 2 yr old and above; Future    Essential hypertension  -     metoprolol succinate (TOPROL-XL) 100 mg 24 hr tablet; Take 1 tablet (100 mg total) by mouth daily  -     losartan (COZAAR) 100 MG tablet;  Take 1 tablet (100 mg total) by mouth daily  -     amLODIPine (NORVASC) 10 mg tablet; Take 1 tablet (10 mg total) by mouth daily    Neck pain  -     ibuprofen (MOTRIN) 400 mg tablet; Take 1 tablet (400 mg total) by mouth every 8 (eight) hours    Trapezius muscle spasm  -     ibuprofen (MOTRIN) 400 mg tablet; Take 1 tablet (400 mg total) by mouth every 8 (eight) hours    Neuropathy  -     gabapentin (Neurontin) 600 MG tablet; Take 1 tablet (600 mg total) by mouth daily at bedtime    Poor appetite  -     cyproheptadine (PERIACTIN) 4 mg tablet; Take 1 tablet (4 mg total) by mouth 2 (two) times a day    Shortness of breath  -     albuterol (Ventolin HFA) 90 mcg/act inhaler; Inhale 2 puffs every 6 (six) hours as needed for wheezing or shortness of breath    Encounter for hepatitis C screening test for low risk patient  -     Hepatitis C antibody; Future    Bilateral carpal tunnel syndrome    Underweight          All of patients questions were answered. Patient understands and agrees with the above plan. Return in about 6 months (around 2024) for Next scheduled follow up HTN. Syd Mcnulty PA-C  23  [address] [facility]          Subjective:     Patient ID: Jovanny Chakraborty  is a 63 y.o. female with known PMH of hypertension, neck pain, poor appetite, bilateral carpal tunnel syndrome, underweight who presents today in office for hypertension follow up. - Patient is a 63 y.o. female who presents today for hypertension follow up. Overall, patient notes she has been doing well. Patient denies any new concerns. Patient notes unfortunately, her sister recently  at age 76 due to colon cancer and her brother recently  at age 66 due to liver cancer. Patient did undergo colonoscopy earlier this year and had 2 polyps removed which were precancerous. Patient is due for repeat colonoscopy in 7 years. Patient denies any abdominal pain, nausea, vomiting, diarrhea, constipation, headache, dizziness, fatigue, weight loss.       The following portions of the patient's history were reviewed and updated as appropriate: allergies, current medications, past family history, past medical history, past social history, past surgical history and problem list.        Review of Systems   Constitutional: Negative for chills, fatigue and fever. HENT: Negative for congestion, ear pain, rhinorrhea and sore throat. Eyes: Negative for pain and visual disturbance. Respiratory: Negative for cough, chest tightness and shortness of breath. Cardiovascular: Negative for chest pain and palpitations. Gastrointestinal: Negative for abdominal pain, blood in stool, constipation, diarrhea, nausea and vomiting. Genitourinary: Negative for difficulty urinating and dysuria. Musculoskeletal: Negative for arthralgias. Skin: Negative for rash. Neurological: Negative for dizziness and headaches. Psychiatric/Behavioral: Negative for behavioral problems. The patient is not nervous/anxious. Objective:   Vitals:    07/11/23 1134   BP: 124/72   BP Location: Left arm   Patient Position: Sitting   Cuff Size: Standard   Pulse: 93   Resp: 18   Temp: 97.8 °F (36.6 °C)   TempSrc: Temporal   SpO2: 99%   Weight: 52.7 kg (116 lb 3.2 oz)   Height: 5' 3" (1.6 m)         Physical Exam  Vitals and nursing note reviewed. Constitutional:       General: She is not in acute distress. Appearance: She is well-developed. HENT:      Head: Normocephalic and atraumatic. Right Ear: External ear normal.      Left Ear: External ear normal.      Nose: Nose normal.   Eyes:      Conjunctiva/sclera: Conjunctivae normal.   Cardiovascular:      Rate and Rhythm: Normal rate and regular rhythm. Pulses: Normal pulses. Heart sounds: Normal heart sounds. Pulmonary:      Effort: Pulmonary effort is normal. No respiratory distress. Breath sounds: Normal breath sounds. No wheezing. Abdominal:      General: Bowel sounds are normal.      Palpations: Abdomen is soft. Tenderness:  There is no abdominal tenderness. Musculoskeletal:      Cervical back: Normal range of motion and neck supple. Right lower leg: No edema. Left lower leg: No edema. Skin:     General: Skin is warm and dry. Neurological:      Mental Status: She is alert and oriented to person, place, and time.    Psychiatric:         Behavior: Behavior normal.

## 2023-07-11 NOTE — ASSESSMENT & PLAN NOTE
- Patient has good appetite when taking cyproheptadine. Julio Shaw has been stable and BMI is now within normal range. Body mass index is 20.58 kg/m². - Patient wishes to continue with cyproheptadine. Continue drinking Ensure as needed.  Will continue to monitor.     Wt Readings from Last 3 Encounters:   07/11/23 52.7 kg (116 lb 3.2 oz)   04/17/23 54 kg (119 lb)   04/05/23 54 kg (119 lb)

## 2023-09-22 ENCOUNTER — VBI (OUTPATIENT)
Dept: ADMINISTRATIVE | Facility: OTHER | Age: 63
End: 2023-09-22

## 2023-09-25 ENCOUNTER — CLINICAL SUPPORT (OUTPATIENT)
Dept: FAMILY MEDICINE CLINIC | Facility: CLINIC | Age: 63
End: 2023-09-25

## 2023-09-25 DIAGNOSIS — Z11.1 ENCOUNTER FOR PPD TEST: Primary | ICD-10-CM

## 2023-09-25 PROCEDURE — 86580 TB INTRADERMAL TEST: CPT

## 2023-09-27 ENCOUNTER — CLINICAL SUPPORT (OUTPATIENT)
Dept: FAMILY MEDICINE CLINIC | Facility: CLINIC | Age: 63
End: 2023-09-27

## 2023-09-27 DIAGNOSIS — Z11.1 ENCOUNTER FOR PPD SKIN TEST READING: Primary | ICD-10-CM

## 2023-09-27 LAB
INDURATION: 0 MM
TB SKIN TEST: NEGATIVE

## 2023-10-09 ENCOUNTER — CLINICAL SUPPORT (OUTPATIENT)
Dept: FAMILY MEDICINE CLINIC | Facility: CLINIC | Age: 63
End: 2023-10-09

## 2023-10-09 DIAGNOSIS — Z11.1 ENCOUNTER FOR PPD TEST: Primary | ICD-10-CM

## 2023-10-09 PROCEDURE — 86580 TB INTRADERMAL TEST: CPT

## 2023-10-11 ENCOUNTER — CLINICAL SUPPORT (OUTPATIENT)
Dept: FAMILY MEDICINE CLINIC | Facility: CLINIC | Age: 63
End: 2023-10-11

## 2023-10-11 DIAGNOSIS — Z11.1 ENCOUNTER FOR PPD TEST: Primary | ICD-10-CM

## 2023-10-11 LAB
INDURATION: 0 MM
TB SKIN TEST: NEGATIVE

## 2023-11-14 ENCOUNTER — TELEPHONE (OUTPATIENT)
Dept: FAMILY MEDICINE CLINIC | Facility: CLINIC | Age: 63
End: 2023-11-14

## 2023-11-14 DIAGNOSIS — G62.9 NEUROPATHY: ICD-10-CM

## 2023-11-14 DIAGNOSIS — I10 ESSENTIAL HYPERTENSION: ICD-10-CM

## 2023-11-14 RX ORDER — METOPROLOL SUCCINATE 100 MG/1
100 TABLET, EXTENDED RELEASE ORAL DAILY
Qty: 90 TABLET | Refills: 1 | Status: SHIPPED | OUTPATIENT
Start: 2023-11-14

## 2023-11-14 RX ORDER — GABAPENTIN 600 MG/1
600 TABLET ORAL
Qty: 90 TABLET | Refills: 1 | Status: SHIPPED | OUTPATIENT
Start: 2023-11-14

## 2023-11-14 RX ORDER — LOSARTAN POTASSIUM 100 MG/1
100 TABLET ORAL DAILY
Qty: 90 TABLET | Refills: 1 | Status: SHIPPED | OUTPATIENT
Start: 2023-11-14

## 2023-11-14 NOTE — TELEPHONE ENCOUNTER
Pt came in the office today requesting refills for her gabapentin, metoprolol, and losartan. Please advise.

## 2024-01-10 ENCOUNTER — APPOINTMENT (OUTPATIENT)
Dept: LAB | Facility: CLINIC | Age: 64
End: 2024-01-10
Payer: COMMERCIAL

## 2024-01-10 DIAGNOSIS — I10 ESSENTIAL HYPERTENSION: ICD-10-CM

## 2024-01-10 DIAGNOSIS — Z11.4 SCREENING FOR HIV (HUMAN IMMUNODEFICIENCY VIRUS): ICD-10-CM

## 2024-01-10 DIAGNOSIS — M54.2 NECK PAIN: ICD-10-CM

## 2024-01-10 DIAGNOSIS — M62.838 TRAPEZIUS MUSCLE SPASM: ICD-10-CM

## 2024-01-10 DIAGNOSIS — Z11.59 ENCOUNTER FOR HEPATITIS C SCREENING TEST FOR LOW RISK PATIENT: ICD-10-CM

## 2024-01-10 LAB
ALBUMIN SERPL BCP-MCNC: 4.9 G/DL (ref 3.5–5)
ALP SERPL-CCNC: 60 U/L (ref 34–104)
ALT SERPL W P-5'-P-CCNC: 28 U/L (ref 7–52)
ANION GAP SERPL CALCULATED.3IONS-SCNC: 10 MMOL/L
AST SERPL W P-5'-P-CCNC: 24 U/L (ref 13–39)
BASOPHILS # BLD AUTO: 0.05 THOUSANDS/ÂΜL (ref 0–0.1)
BASOPHILS NFR BLD AUTO: 1 % (ref 0–1)
BILIRUB SERPL-MCNC: 0.59 MG/DL (ref 0.2–1)
BUN SERPL-MCNC: 7 MG/DL (ref 5–25)
CALCIUM SERPL-MCNC: 10 MG/DL (ref 8.4–10.2)
CHLORIDE SERPL-SCNC: 103 MMOL/L (ref 96–108)
CHOLEST SERPL-MCNC: 207 MG/DL
CO2 SERPL-SCNC: 30 MMOL/L (ref 21–32)
CREAT SERPL-MCNC: 0.63 MG/DL (ref 0.6–1.3)
EOSINOPHIL # BLD AUTO: 0.3 THOUSAND/ÂΜL (ref 0–0.61)
EOSINOPHIL NFR BLD AUTO: 6 % (ref 0–6)
ERYTHROCYTE [DISTWIDTH] IN BLOOD BY AUTOMATED COUNT: 12.6 % (ref 11.6–15.1)
EST. AVERAGE GLUCOSE BLD GHB EST-MCNC: 131 MG/DL
GFR SERPL CREATININE-BSD FRML MDRD: 97 ML/MIN/1.73SQ M
GLUCOSE P FAST SERPL-MCNC: 111 MG/DL (ref 65–99)
HBA1C MFR BLD: 6.2 %
HCT VFR BLD AUTO: 41.6 % (ref 34.8–46.1)
HDLC SERPL-MCNC: 73 MG/DL
HGB BLD-MCNC: 13 G/DL (ref 11.5–15.4)
IMM GRANULOCYTES # BLD AUTO: 0.01 THOUSAND/UL (ref 0–0.2)
IMM GRANULOCYTES NFR BLD AUTO: 0 % (ref 0–2)
LDLC SERPL CALC-MCNC: 107 MG/DL (ref 0–100)
LYMPHOCYTES # BLD AUTO: 1.7 THOUSANDS/ÂΜL (ref 0.6–4.47)
LYMPHOCYTES NFR BLD AUTO: 34 % (ref 14–44)
MCH RBC QN AUTO: 27.5 PG (ref 26.8–34.3)
MCHC RBC AUTO-ENTMCNC: 31.3 G/DL (ref 31.4–37.4)
MCV RBC AUTO: 88 FL (ref 82–98)
MONOCYTES # BLD AUTO: 0.44 THOUSAND/ÂΜL (ref 0.17–1.22)
MONOCYTES NFR BLD AUTO: 9 % (ref 4–12)
NEUTROPHILS # BLD AUTO: 2.54 THOUSANDS/ÂΜL (ref 1.85–7.62)
NEUTS SEG NFR BLD AUTO: 50 % (ref 43–75)
NONHDLC SERPL-MCNC: 134 MG/DL
NRBC BLD AUTO-RTO: 0 /100 WBCS
PLATELET # BLD AUTO: 444 THOUSANDS/UL (ref 149–390)
PMV BLD AUTO: 10.9 FL (ref 8.9–12.7)
POTASSIUM SERPL-SCNC: 3.8 MMOL/L (ref 3.5–5.3)
PROT SERPL-MCNC: 8.1 G/DL (ref 6.4–8.4)
RBC # BLD AUTO: 4.72 MILLION/UL (ref 3.81–5.12)
SODIUM SERPL-SCNC: 143 MMOL/L (ref 135–147)
TRIGL SERPL-MCNC: 134 MG/DL
WBC # BLD AUTO: 5.04 THOUSAND/UL (ref 4.31–10.16)

## 2024-01-10 PROCEDURE — 87389 HIV-1 AG W/HIV-1&-2 AB AG IA: CPT

## 2024-01-10 PROCEDURE — 85025 COMPLETE CBC W/AUTO DIFF WBC: CPT

## 2024-01-10 PROCEDURE — 80053 COMPREHEN METABOLIC PANEL: CPT

## 2024-01-10 PROCEDURE — 83036 HEMOGLOBIN GLYCOSYLATED A1C: CPT

## 2024-01-10 PROCEDURE — 86803 HEPATITIS C AB TEST: CPT

## 2024-01-10 PROCEDURE — 80061 LIPID PANEL: CPT

## 2024-01-10 PROCEDURE — 36415 COLL VENOUS BLD VENIPUNCTURE: CPT

## 2024-01-10 RX ORDER — IBUPROFEN 400 MG/1
400 TABLET ORAL EVERY 8 HOURS
Qty: 30 TABLET | Refills: 1 | Status: SHIPPED | OUTPATIENT
Start: 2024-01-10

## 2024-01-11 LAB
HCV AB SER QL: NORMAL
HIV 1+2 AB+HIV1 P24 AG SERPL QL IA: NORMAL
HIV 2 AB SERPL QL IA: NORMAL
HIV1 AB SERPL QL IA: NORMAL
HIV1 P24 AG SERPL QL IA: NORMAL

## 2024-02-15 ENCOUNTER — OFFICE VISIT (OUTPATIENT)
Dept: FAMILY MEDICINE CLINIC | Facility: CLINIC | Age: 64
End: 2024-02-15

## 2024-02-15 VITALS
WEIGHT: 110 LBS | TEMPERATURE: 96.8 F | SYSTOLIC BLOOD PRESSURE: 128 MMHG | RESPIRATION RATE: 18 BRPM | HEIGHT: 63 IN | BODY MASS INDEX: 19.49 KG/M2 | OXYGEN SATURATION: 99 % | DIASTOLIC BLOOD PRESSURE: 96 MMHG | HEART RATE: 88 BPM

## 2024-02-15 DIAGNOSIS — M25.511 ACUTE PAIN OF RIGHT SHOULDER: ICD-10-CM

## 2024-02-15 DIAGNOSIS — I10 ESSENTIAL HYPERTENSION: Chronic | ICD-10-CM

## 2024-02-15 DIAGNOSIS — R32 URINARY INCONTINENCE, UNSPECIFIED TYPE: Primary | ICD-10-CM

## 2024-02-15 DIAGNOSIS — M62.838 TRAPEZIUS MUSCLE SPASM: ICD-10-CM

## 2024-02-15 DIAGNOSIS — M54.2 NECK PAIN: ICD-10-CM

## 2024-02-15 DIAGNOSIS — Z12.31 BREAST CANCER SCREENING BY MAMMOGRAM: ICD-10-CM

## 2024-02-15 DIAGNOSIS — R35.0 URINARY FREQUENCY: ICD-10-CM

## 2024-02-15 DIAGNOSIS — R63.6 UNDERWEIGHT: Chronic | ICD-10-CM

## 2024-02-15 DIAGNOSIS — R73.03 PREDIABETES: ICD-10-CM

## 2024-02-15 DIAGNOSIS — G62.9 NEUROPATHY: Chronic | ICD-10-CM

## 2024-02-15 DIAGNOSIS — R63.0 POOR APPETITE: Chronic | ICD-10-CM

## 2024-02-15 DIAGNOSIS — R06.02 SHORTNESS OF BREATH: ICD-10-CM

## 2024-02-15 PROCEDURE — 3080F DIAST BP >= 90 MM HG: CPT | Performed by: PHYSICIAN ASSISTANT

## 2024-02-15 PROCEDURE — 3074F SYST BP LT 130 MM HG: CPT | Performed by: PHYSICIAN ASSISTANT

## 2024-02-15 PROCEDURE — 99214 OFFICE O/P EST MOD 30 MIN: CPT | Performed by: PHYSICIAN ASSISTANT

## 2024-02-15 RX ORDER — ALBUTEROL SULFATE 90 UG/1
2 AEROSOL, METERED RESPIRATORY (INHALATION) EVERY 6 HOURS PRN
Qty: 8 G | Refills: 2 | Status: SHIPPED | OUTPATIENT
Start: 2024-02-15

## 2024-02-15 RX ORDER — CYPROHEPTADINE HYDROCHLORIDE 4 MG/1
4 TABLET ORAL 2 TIMES DAILY
Qty: 180 TABLET | Refills: 1 | Status: SHIPPED | OUTPATIENT
Start: 2024-02-15

## 2024-02-15 RX ORDER — IBUPROFEN 400 MG/1
400 TABLET ORAL EVERY 8 HOURS
Qty: 30 TABLET | Refills: 1 | Status: SHIPPED | OUTPATIENT
Start: 2024-02-15

## 2024-02-15 RX ORDER — AMLODIPINE BESYLATE 10 MG/1
10 TABLET ORAL DAILY
Qty: 90 TABLET | Refills: 1 | Status: SHIPPED | OUTPATIENT
Start: 2024-02-15

## 2024-02-15 RX ORDER — CYCLOBENZAPRINE HCL 10 MG
10 TABLET ORAL 2 TIMES DAILY PRN
Qty: 60 TABLET | Refills: 1 | Status: SHIPPED | OUTPATIENT
Start: 2024-02-15

## 2024-02-15 RX ORDER — GABAPENTIN 800 MG/1
800 TABLET ORAL
Qty: 90 TABLET | Refills: 1 | Status: SHIPPED | OUTPATIENT
Start: 2024-02-15

## 2024-02-15 RX ORDER — OXYBUTYNIN CHLORIDE 5 MG/1
5 TABLET, EXTENDED RELEASE ORAL DAILY
Qty: 30 TABLET | Refills: 2 | Status: SHIPPED | OUTPATIENT
Start: 2024-02-15

## 2024-02-15 RX ORDER — LOSARTAN POTASSIUM 100 MG/1
100 TABLET ORAL DAILY
Qty: 90 TABLET | Refills: 1 | Status: SHIPPED | OUTPATIENT
Start: 2024-02-15

## 2024-02-15 RX ORDER — METOPROLOL SUCCINATE 100 MG/1
100 TABLET, EXTENDED RELEASE ORAL DAILY
Qty: 90 TABLET | Refills: 1 | Status: SHIPPED | OUTPATIENT
Start: 2024-02-15

## 2024-02-15 NOTE — PROGRESS NOTES
"Assessment/Plan:  I have recommended that this patient have a flu shot, tetanus booster, and immunization for shingles, COVID-19 but she declines at this time. I have discussed the risks and benefits of this examination with her. The patient verbalizes understanding.    Underweight  - Patient has good appetite when taking cyproheptadine.  Weight has been stable and BMI is now within normal range. Body mass index is 19.49 kg/m².  - Patient wishes to continue with cyproheptadine. Continue drinking Ensure as needed.  Will continue to monitor.    Wt Readings from Last 3 Encounters:   02/15/24 49.9 kg (110 lb)   07/11/23 52.7 kg (116 lb 3.2 oz)   04/17/23 54 kg (119 lb)       Essential hypertension  - Continue amlodipine 10 mg daily, losartan 100 mg daily, and metoprolol succinate 100 mg daily.  - Currently blood pressure is controlled at this time.  Reviewed BP target goal with patient.    - Continue to maintain healthy balanced diet with focus on low salt intake. Limit alcohol intake.   - Advised to exercise at least 30 minutes a day for at least 5 days out of the week.     Poor appetite  - See plan for \"underweight\"    Neuropathy  -  Stable and much improved since starting gabapentin and wearing wrist splints.   - Continue nighttime wrist splints.  - Will increase gabapentin to 800 mg daily bedtime.    Urinary incontinence  - Recommend starting daily Kegel exercises.  Demonstration and handout provided to patient.  - Will refer to urogynecology for further evaluation and management.    Acute pain of right shoulder  - Will prescribe Flexeril 10 mg, twice daily as needed.  - Continue ibuprofen for 400 mg as needed.  -Advised to apply ice/heating pad/topical therapies as needed to affected area.  - Provided patient with list of exercises to perform daily.  - If symptoms persist/worsen, would recommend referral to physical therapy.    Urinary frequency  - Will trial oxybutynin 5 mg daily.     Prediabetes  - Reviewed " recent hemoglobin A1c of 6.2.  Patient now within prediabetic range.  This is a new diagnosis for patient.  - Advised to follow diabetic diet with focus on low intake of carbohydrates and sugars.    Lab Results   Component Value Date    HGBA1C 6.2 (H) 01/10/2024         Diagnoses and all orders for this visit:    Urinary incontinence, unspecified type  -     Ambulatory Referral to Urogynecology; Future    Breast cancer screening by mammogram  -     Mammo screening bilateral w 3d & cad; Future    Acute pain of right shoulder  -     cyclobenzaprine (FLEXERIL) 10 mg tablet; Take 1 tablet (10 mg total) by mouth 2 (two) times a day as needed for muscle spasms    Neuropathy  -     gabapentin (NEURONTIN) 800 mg tablet; Take 1 tablet (800 mg total) by mouth daily at bedtime    Underweight    Essential hypertension  -     amLODIPine (NORVASC) 10 mg tablet; Take 1 tablet (10 mg total) by mouth daily  -     losartan (COZAAR) 100 MG tablet; Take 1 tablet (100 mg total) by mouth daily  -     metoprolol succinate (TOPROL-XL) 100 mg 24 hr tablet; Take 1 tablet (100 mg total) by mouth daily    Poor appetite  -     cyproheptadine (PERIACTIN) 4 mg tablet; Take 1 tablet (4 mg total) by mouth 2 (two) times a day    Neck pain  -     ibuprofen (MOTRIN) 400 mg tablet; Take 1 tablet (400 mg total) by mouth every 8 (eight) hours    Trapezius muscle spasm  -     ibuprofen (MOTRIN) 400 mg tablet; Take 1 tablet (400 mg total) by mouth every 8 (eight) hours    Shortness of breath  -     albuterol (Ventolin HFA) 90 mcg/act inhaler; Inhale 2 puffs every 6 (six) hours as needed for wheezing or shortness of breath    Urinary frequency  -     oxybutynin (DITROPAN-XL) 5 mg 24 hr tablet; Take 1 tablet (5 mg total) by mouth daily    Prediabetes        All of patients questions were answered. Patient understands and agrees with the above plan.     Return in about 3 months (around 5/15/2024) for Annual physical.    Mary Lou Lynch PA-C  02/15/24  K[place]I  VER[place]NICA Dolan          Subjective:     Patient ID: Carmen Perez  is a 63 y.o. female with known PMH of hypertension, neck pain, poor appetite, bilateral carpal tunnel syndrome, underweight who presents today in office for hypertension follow up.     - Patient is a 63 y.o. female who presents today for hypertension follow up.  Patient notes for many years she has been experiencin urinary incontinence and urinary frequency.  Patient notes she has to use the bathroom several times an hour.  Patient notes she always has to wear panty liners.  She denies any associated dysuria, abdominal pain, flank pain, blood in the urine.  Patient has had 3 children.  Patient notes recently she has been experiencing increased right shoulder pain.  She denies any injury or trauma to the area.      The following portions of the patient's history were reviewed and updated as appropriate: allergies, current medications, past family history, past medical history, past social history, past surgical history, and problem list.        Review of Systems   Constitutional:  Negative for chills, fatigue and fever.   HENT:  Negative for congestion, ear pain, rhinorrhea and sore throat.    Eyes:  Negative for pain and visual disturbance.   Respiratory:  Negative for cough, chest tightness and shortness of breath.    Cardiovascular:  Negative for chest pain and palpitations.   Gastrointestinal:  Negative for abdominal pain, blood in stool, constipation, diarrhea, nausea and vomiting.   Genitourinary:  Positive for frequency and urgency. Negative for difficulty urinating, dysuria, hematuria and pelvic pain.   Musculoskeletal:  Positive for arthralgias.   Skin:  Negative for rash.   Neurological:  Negative for dizziness and headaches.   Psychiatric/Behavioral:  Negative for behavioral problems. The patient is not nervous/anxious.             Depression Screening and Follow-up Plan: Patient was screened for depression during today's encounter.  "They screened negative with a PHQ-2 score of 0.          Objective:   Vitals:    02/15/24 1103   BP: 128/96   BP Location: Left arm   Patient Position: Sitting   Cuff Size: Standard   Pulse: 88   Resp: 18   Temp: (!) 96.8 °F (36 °C)   TempSrc: Temporal   SpO2: 99%   Weight: 49.9 kg (110 lb)   Height: 5' 3\" (1.6 m)         Physical Exam  Vitals and nursing note reviewed.   Constitutional:       General: She is not in acute distress.     Appearance: She is well-developed.   HENT:      Head: Normocephalic and atraumatic.      Right Ear: External ear normal.      Left Ear: External ear normal.      Nose: Nose normal.   Eyes:      Conjunctiva/sclera: Conjunctivae normal.   Cardiovascular:      Rate and Rhythm: Normal rate and regular rhythm.      Pulses: Normal pulses.      Heart sounds: Normal heart sounds.   Pulmonary:      Effort: Pulmonary effort is normal. No respiratory distress.      Breath sounds: Normal breath sounds. No wheezing.   Musculoskeletal:      Right shoulder: No swelling or tenderness. Decreased range of motion.      Cervical back: Normal range of motion and neck supple.   Skin:     General: Skin is warm and dry.   Neurological:      Mental Status: She is alert and oriented to person, place, and time.   Psychiatric:         Behavior: Behavior normal.           PHQ-2/9 Depression Screening    Little interest or pleasure in doing things: 0 - not at all  Feeling down, depressed, or hopeless: 0 - not at all  PHQ-2 Score: 0  PHQ-2 Interpretation: Negative depression screen       "

## 2024-02-15 NOTE — ASSESSMENT & PLAN NOTE
-  Stable and much improved since starting gabapentin and wearing wrist splints.   - Continue nighttime wrist splints.  - Will increase gabapentin to 800 mg daily bedtime.

## 2024-02-15 NOTE — PATIENT INSTRUCTIONS
[facility]  [address]  [address]  [place] [address] [phone]

## 2024-02-15 NOTE — ASSESSMENT & PLAN NOTE
- Patient has good appetite when taking cyproheptadine.  Weight has been stable and BMI is now within normal range. Body mass index is 19.49 kg/m².  - Patient wishes to continue with cyproheptadine. Continue drinking Ensure as needed.  Will continue to monitor.    Wt Readings from Last 3 Encounters:   02/15/24 49.9 kg (110 lb)   07/11/23 52.7 kg (116 lb 3.2 oz)   04/17/23 54 kg (119 lb)

## 2024-02-15 NOTE — ASSESSMENT & PLAN NOTE
- Recommend starting daily Kegel exercises.  Demonstration and handout provided to patient.  - Will refer to urogynecology for further evaluation and management.

## 2024-02-15 NOTE — ASSESSMENT & PLAN NOTE
- Will prescribe Flexeril 10 mg, twice daily as needed.  - Continue ibuprofen for 400 mg as needed.  -Advised to apply ice/heating pad/topical therapies as needed to affected area.  - Provided patient with list of exercises to perform daily.  - If symptoms persist/worsen, would recommend referral to physical therapy.

## 2024-02-15 NOTE — ASSESSMENT & PLAN NOTE
- Reviewed recent hemoglobin A1c of 6.2.  Patient now within prediabetic range.  This is a new diagnosis for patient.  - Advised to follow diabetic diet with focus on low intake of carbohydrates and sugars.    Lab Results   Component Value Date    HGBA1C 6.2 (H) 01/10/2024

## 2024-02-21 PROBLEM — J12.1 PNEUMONIA DUE TO RESPIRATORY SYNCYTIAL VIRUS (RSV): Status: RESOLVED | Noted: 2019-12-11 | Resolved: 2024-02-21

## 2024-04-26 ENCOUNTER — HOSPITAL ENCOUNTER (OUTPATIENT)
Dept: MAMMOGRAPHY | Facility: CLINIC | Age: 64
End: 2024-04-26
Payer: COMMERCIAL

## 2024-04-26 VITALS — BODY MASS INDEX: 19.49 KG/M2 | WEIGHT: 110 LBS | HEIGHT: 63 IN

## 2024-04-26 DIAGNOSIS — Z12.31 BREAST CANCER SCREENING BY MAMMOGRAM: ICD-10-CM

## 2024-04-26 PROCEDURE — 77067 SCR MAMMO BI INCL CAD: CPT

## 2024-04-26 PROCEDURE — 77063 BREAST TOMOSYNTHESIS BI: CPT

## 2024-05-14 ENCOUNTER — TELEPHONE (OUTPATIENT)
Dept: FAMILY MEDICINE CLINIC | Facility: CLINIC | Age: 64
End: 2024-05-14

## 2024-07-10 ENCOUNTER — OFFICE VISIT (OUTPATIENT)
Dept: FAMILY MEDICINE CLINIC | Facility: CLINIC | Age: 64
End: 2024-07-10

## 2024-07-10 VITALS
TEMPERATURE: 98 F | OXYGEN SATURATION: 99 % | HEART RATE: 59 BPM | SYSTOLIC BLOOD PRESSURE: 156 MMHG | HEIGHT: 63 IN | DIASTOLIC BLOOD PRESSURE: 96 MMHG | RESPIRATION RATE: 16 BRPM | WEIGHT: 110 LBS | BODY MASS INDEX: 19.49 KG/M2

## 2024-07-10 DIAGNOSIS — M54.2 NECK PAIN: ICD-10-CM

## 2024-07-10 DIAGNOSIS — R63.6 UNDERWEIGHT: Chronic | ICD-10-CM

## 2024-07-10 DIAGNOSIS — I10 ESSENTIAL HYPERTENSION: Chronic | ICD-10-CM

## 2024-07-10 DIAGNOSIS — G62.9 NEUROPATHY: Chronic | ICD-10-CM

## 2024-07-10 DIAGNOSIS — R35.0 URINARY FREQUENCY: ICD-10-CM

## 2024-07-10 DIAGNOSIS — R06.02 SHORTNESS OF BREATH: ICD-10-CM

## 2024-07-10 DIAGNOSIS — Z00.00 ANNUAL PHYSICAL EXAM: Primary | ICD-10-CM

## 2024-07-10 DIAGNOSIS — M25.511 ACUTE PAIN OF RIGHT SHOULDER: ICD-10-CM

## 2024-07-10 DIAGNOSIS — R73.03 PREDIABETES: Chronic | ICD-10-CM

## 2024-07-10 DIAGNOSIS — R63.0 POOR APPETITE: Chronic | ICD-10-CM

## 2024-07-10 DIAGNOSIS — M62.838 TRAPEZIUS MUSCLE SPASM: ICD-10-CM

## 2024-07-10 PROCEDURE — 99214 OFFICE O/P EST MOD 30 MIN: CPT | Performed by: PHYSICIAN ASSISTANT

## 2024-07-10 PROCEDURE — 99396 PREV VISIT EST AGE 40-64: CPT | Performed by: PHYSICIAN ASSISTANT

## 2024-07-10 PROCEDURE — 3080F DIAST BP >= 90 MM HG: CPT | Performed by: PHYSICIAN ASSISTANT

## 2024-07-10 PROCEDURE — 3077F SYST BP >= 140 MM HG: CPT | Performed by: PHYSICIAN ASSISTANT

## 2024-07-10 RX ORDER — METOPROLOL SUCCINATE 100 MG/1
100 TABLET, EXTENDED RELEASE ORAL DAILY
Qty: 90 TABLET | Refills: 1 | Status: SHIPPED | OUTPATIENT
Start: 2024-07-10

## 2024-07-10 RX ORDER — CYPROHEPTADINE HYDROCHLORIDE 4 MG/1
4 TABLET ORAL 2 TIMES DAILY
Qty: 180 TABLET | Refills: 1 | Status: SHIPPED | OUTPATIENT
Start: 2024-07-10

## 2024-07-10 RX ORDER — AMLODIPINE BESYLATE 10 MG/1
10 TABLET ORAL DAILY
Qty: 90 TABLET | Refills: 1 | Status: SHIPPED | OUTPATIENT
Start: 2024-07-10

## 2024-07-10 RX ORDER — GABAPENTIN 800 MG/1
800 TABLET ORAL
Qty: 90 TABLET | Refills: 1 | Status: SHIPPED | OUTPATIENT
Start: 2024-07-10

## 2024-07-10 RX ORDER — ALBUTEROL SULFATE 90 UG/1
2 AEROSOL, METERED RESPIRATORY (INHALATION) EVERY 6 HOURS PRN
Qty: 8 G | Refills: 2 | Status: SHIPPED | OUTPATIENT
Start: 2024-07-10

## 2024-07-10 RX ORDER — LOSARTAN POTASSIUM 100 MG/1
100 TABLET ORAL DAILY
Qty: 90 TABLET | Refills: 1 | Status: SHIPPED | OUTPATIENT
Start: 2024-07-10

## 2024-07-10 RX ORDER — OXYBUTYNIN CHLORIDE 10 MG/1
10 TABLET, EXTENDED RELEASE ORAL
Qty: 30 TABLET | Refills: 2 | Status: SHIPPED | OUTPATIENT
Start: 2024-07-10

## 2024-07-10 RX ORDER — IBUPROFEN 400 MG/1
400 TABLET ORAL EVERY 8 HOURS
Qty: 30 TABLET | Refills: 1 | Status: SHIPPED | OUTPATIENT
Start: 2024-07-10

## 2024-07-10 RX ORDER — CYCLOBENZAPRINE HCL 10 MG
10 TABLET ORAL 2 TIMES DAILY PRN
Qty: 60 TABLET | Refills: 1 | Status: SHIPPED | OUTPATIENT
Start: 2024-07-10

## 2024-07-10 NOTE — ASSESSMENT & PLAN NOTE
-  Stable and much improved since starting gabapentin and wearing wrist splints.   - Continue nighttime wrist splints.  -Continue gabapentin 800 mg daily bedtime.

## 2024-07-10 NOTE — PROGRESS NOTES
"ADULT ANNUAL PHYSICAL  [facility]    NAME: Carmen Perez  AGE: 63 y.o. SEX: female  : 1960     DATE: 7/10/2024     Assessment and Plan:     Problem List Items Addressed This Visit          Cardiovascular and Mediastinum    Essential hypertension (Chronic)     - Continue amlodipine 10 mg daily, losartan 100 mg daily, and metoprolol succinate 100 mg daily.  -Blood pressure slightly elevated today.  Reviewed BP target goal with patient.    - Continue to maintain healthy balanced diet with focus on low salt intake. Limit alcohol intake.   - Advised to exercise at least 30 minutes a day for at least 5 days out of the week.          Relevant Medications    metoprolol succinate (TOPROL-XL) 100 mg 24 hr tablet    losartan (COZAAR) 100 MG tablet    amLODIPine (NORVASC) 10 mg tablet       Nervous and Auditory    Neuropathy (Chronic)     -  Stable and much improved since starting gabapentin and wearing wrist splints.   - Continue nighttime wrist splints.  -Continue gabapentin 800 mg daily bedtime.         Relevant Medications    gabapentin (NEURONTIN) 800 mg tablet       Surgery/Wound/Pain    Neck pain (Chronic)    Relevant Medications    ibuprofen (MOTRIN) 400 mg tablet    Acute pain of right shoulder    Relevant Medications    cyclobenzaprine (FLEXERIL) 10 mg tablet       Other    Underweight (Chronic)     - Patient has good appetite when taking cyproheptadine.  Weight has been stable and BMI is now within normal range. Body mass index is 19.49 kg/m².  - Patient wishes to continue with cyproheptadine. Continue drinking Ensure as needed.  Will continue to monitor.    Wt Readings from Last 3 Encounters:   07/10/24 49.9 kg (110 lb)   24 49.9 kg (110 lb)   02/15/24 49.9 kg (110 lb)              Poor appetite (Chronic)     - See plan for \"underweight\"         Relevant Medications    cyproheptadine (PERIACTIN) 4 mg tablet    Urinary " frequency (Chronic)     - Will increase oxybutynin to 10 mg daily.         Relevant Medications    oxybutynin (DITROPAN-XL) 10 MG 24 hr tablet    Prediabetes (Chronic)     - Reviewed recent hemoglobin A1c of 6.2.  Patient now within prediabetic range.  This is a new diagnosis for patient.  - Advised to follow diabetic diet with focus on low intake of carbohydrates and sugars.    Lab Results   Component Value Date    HGBA1C 6.2 (H) 01/10/2024               Other Visit Diagnoses       Annual physical exam    -  Primary    Shortness of breath        Relevant Medications    albuterol (Ventolin HFA) 90 mcg/act inhaler    Trapezius muscle spasm        Relevant Medications    ibuprofen (MOTRIN) 400 mg tablet            Immunizations and preventive care screenings were discussed with patient today. Appropriate education was printed on patient's after visit summary.    Counseling:  Alcohol/drug use: discussed moderation in alcohol intake, the recommendations for healthy alcohol use, and avoidance of illicit drug use.  Dental Health: discussed importance of regular tooth brushing, flossing, and dental visits.  Injury prevention: discussed safety/seat belts, safety helmets, smoke detectors, carbon dioxide detectors, and smoking near bedding or upholstery.  Sexual health: discussed sexually transmitted diseases, partner selection, use of condoms, avoidance of unintended pregnancy, and contraceptive alternatives.  Exercise: the importance of regular exercise/physical activity was discussed. Recommend exercise 3-5 times per week for at least 30 minutes.          Return in about 3 months (around 10/10/2024) for Next scheduled follow up HTN.     Chief Complaint:     Chief Complaint   Patient presents with    Physical Exam      History of Present Illness:     Adult Annual Physical   Patient here for a comprehensive physical exam.  Patient notes urinary frequency has been improving since starting oxybutynin, but there is still room  for improvement.      Diet and Physical Activity  Diet/Nutrition: well balanced diet.   Exercise: moderate cardiovascular exercise and 5-7 times a week on average.     General Health  Sleep: sleeps well.   Hearing: normal - bilateral.  Vision: goes for regular eye exams and wears glasses.   Dental: no dental visits for >1 year.       /GYN Health  Last menstrual period: Postmenopausal  Contraceptive method: menopause.     Review of Systems:     Review of Systems   Constitutional:  Negative for chills, fatigue and fever.   HENT:  Negative for congestion, ear pain, rhinorrhea and sore throat.    Eyes:  Negative for pain and visual disturbance.   Respiratory:  Negative for cough, chest tightness and shortness of breath.    Cardiovascular:  Negative for chest pain and palpitations.   Gastrointestinal:  Negative for abdominal pain, blood in stool, constipation, diarrhea, nausea and vomiting.   Genitourinary:  Positive for frequency and urgency. Negative for difficulty urinating, dysuria, hematuria and pelvic pain.   Musculoskeletal:  Negative for arthralgias.   Skin:  Negative for rash.   Neurological:  Negative for dizziness and headaches.   Psychiatric/Behavioral:  Negative for behavioral problems. The patient is not nervous/anxious.       Past Medical History:     Past Medical History:   Diagnosis Date    Asthma     History of transfusion     Hypertension       Past Surgical History:     Past Surgical History:   Procedure Laterality Date    BREAST BIOPSY Right 1999    benign     SECTION      SPINE SURGERY        Social History:     Social History     Socioeconomic History    Marital status: Single     Spouse name: None    Number of children: None    Years of education: None    Highest education level: None   Occupational History    None   Tobacco Use    Smoking status: Never    Smokeless tobacco: Never   Vaping Use    Vaping status: Never Used   Substance and Sexual Activity    Alcohol use: Yes     Comment:  social    Drug use: Never    Sexual activity: Yes     Partners: Male   Other Topics Concern    None   Social History Narrative    None     Social Determinants of Health     Financial Resource Strain: High Risk (2/15/2024)    Overall Financial Resource Strain (CARDIA)     Difficulty of Paying Living Expenses: Very hard   Food Insecurity: Food Insecurity Present (2/15/2024)    Hunger Vital Sign     Worried About Running Out of Food in the Last Year: Sometimes true     Ran Out of Food in the Last Year: Sometimes true   Transportation Needs: Unmet Transportation Needs (2/15/2024)    PRAPARE - Transportation     Lack of Transportation (Medical): Yes     Lack of Transportation (Non-Medical): Yes   Physical Activity: Not on file   Stress: Not on file   Social Connections: Not on file   Intimate Partner Violence: Not on file   Housing Stability: Unknown (7/10/2024)    Housing Stability Vital Sign     Unable to Pay for Housing in the Last Year: No     Number of Times Moved in the Last Year: Not on file     Homeless in the Last Year: No       Social Determinants of Health     Tobacco Use: Low Risk  (7/10/2024)    Patient History     Smoking Tobacco Use: Never     Smokeless Tobacco Use: Never     Passive Exposure: Not on file   Alcohol Use: Not At Risk (12/1/2020)    AUDIT-C     Frequency of Alcohol Consumption: 2-4 times a month     Average Number of Drinks: 1 or 2     Frequency of Binge Drinking: Never   Financial Resource Strain: High Risk (2/15/2024)    Overall Financial Resource Strain (CARDIA)     Difficulty of Paying Living Expenses: Very hard   Food Insecurity: Food Insecurity Present (2/15/2024)    Hunger Vital Sign     Worried About Running Out of Food in the Last Year: Sometimes true     Ran Out of Food in the Last Year: Sometimes true   Transportation Needs: Unmet Transportation Needs (2/15/2024)    PRAPARE - Transportation     Lack of Transportation (Medical): Yes     Lack of Transportation (Non-Medical): Yes    Physical Activity: Not on file   Stress: Not on file   Social Connections: Not on file   Intimate Partner Violence: Not on file   Depression: Not at risk (2/15/2024)    PHQ-2     PHQ-2 Score: 0   Housing Stability: Unknown (7/10/2024)    Housing Stability Vital Sign     Unable to Pay for Housing in the Last Year: No     Number of Times Moved in the Last Year: Not on file     Homeless in the Last Year: No   Utilities: Not At Risk (7/10/2024)    [facility] Utilities     Threatened with loss of utilities: No   Health Literacy: Not on file        Family History:     Family History   Problem Relation Age of Onset    Colon cancer Mother     Cancer Mother     No Known Problems Father     Stomach cancer Sister     Colon cancer Sister          age 75    No Known Problems Daughter     No Known Problems Maternal Grandmother     No Known Problems Maternal Grandfather     No Known Problems Paternal Grandmother     No Known Problems Paternal Grandfather     Liver cancer Brother          age 78    Cancer Brother     Stomach cancer Son     Breast cancer Neg Hx       Current Medications:     Current Outpatient Medications   Medication Sig Dispense Refill    albuterol (Ventolin HFA) 90 mcg/act inhaler Inhale 2 puffs every 6 (six) hours as needed for wheezing or shortness of breath 8 g 2    amLODIPine (NORVASC) 10 mg tablet Take 1 tablet (10 mg total) by mouth daily 90 tablet 1    cyclobenzaprine (FLEXERIL) 10 mg tablet Take 1 tablet (10 mg total) by mouth 2 (two) times a day as needed for muscle spasms 60 tablet 1    cyproheptadine (PERIACTIN) 4 mg tablet Take 1 tablet (4 mg total) by mouth 2 (two) times a day 180 tablet 1    gabapentin (NEURONTIN) 800 mg tablet Take 1 tablet (800 mg total) by mouth daily at bedtime 90 tablet 1    ibuprofen (MOTRIN) 400 mg tablet Take 1 tablet (400 mg total) by mouth every 8 (eight) hours 30 tablet 1    losartan (COZAAR) 100 MG tablet Take 1 tablet (100 mg total) by mouth daily 90 tablet 1     "metoprolol succinate (TOPROL-XL) 100 mg 24 hr tablet Take 1 tablet (100 mg total) by mouth daily 90 tablet 1    oxybutynin (DITROPAN-XL) 10 MG 24 hr tablet Take 1 tablet (10 mg total) by mouth daily at bedtime 30 tablet 2    polyethylene glycol (GOLYTELY) 4000 mL solution Take as directed by the office. 4000 mL 0     No current facility-administered medications for this visit.      Allergies:     Allergies   Allergen Reactions    Aspirin Swelling     Patient jaison toradol 15mg at ed    Penicillins Swelling      Physical Exam:     /96 (BP Location: Left arm, Patient Position: Sitting, Cuff Size: Standard)   Pulse 59   Temp 98 °F (36.7 °C) (Temporal)   Resp 16   Ht 5' 3\" (1.6 m)   Wt 49.9 kg (110 lb)   SpO2 99%   BMI 19.49 kg/m²     Physical Exam  Vitals and nursing note reviewed.   Constitutional:       General: She is not in acute distress.     Appearance: She is well-developed.   HENT:      Head: Normocephalic and atraumatic.      Right Ear: External ear normal.      Left Ear: External ear normal.      Nose: Nose normal.   Eyes:      Conjunctiva/sclera: Conjunctivae normal.   Cardiovascular:      Rate and Rhythm: Normal rate and regular rhythm.      Pulses: Normal pulses.      Heart sounds: Normal heart sounds.   Pulmonary:      Effort: Pulmonary effort is normal. No respiratory distress.      Breath sounds: Normal breath sounds. No wheezing.   Abdominal:      General: Bowel sounds are normal.      Palpations: Abdomen is soft.      Tenderness: There is no abdominal tenderness.   Musculoskeletal:      Cervical back: Normal range of motion and neck supple.   Skin:     General: Skin is warm and dry.   Neurological:      Mental Status: She is alert and oriented to person, place, and time.   Psychiatric:         Behavior: Behavior normal.         Mary Lou Lynch PA-C   [facility]  "

## 2024-07-10 NOTE — ASSESSMENT & PLAN NOTE
- Patient has good appetite when taking cyproheptadine.  Weight has been stable and BMI is now within normal range. Body mass index is 19.49 kg/m².  - Patient wishes to continue with cyproheptadine. Continue drinking Ensure as needed.  Will continue to monitor.    Wt Readings from Last 3 Encounters:   07/10/24 49.9 kg (110 lb)   04/26/24 49.9 kg (110 lb)   02/15/24 49.9 kg (110 lb)

## 2024-07-10 NOTE — ASSESSMENT & PLAN NOTE
- Continue amlodipine 10 mg daily, losartan 100 mg daily, and metoprolol succinate 100 mg daily.  -Blood pressure slightly elevated today.  Reviewed BP target goal with patient.    - Continue to maintain healthy balanced diet with focus on low salt intake. Limit alcohol intake.   - Advised to exercise at least 30 minutes a day for at least 5 days out of the week.

## 2024-09-11 NOTE — ASSESSMENT & PLAN NOTE
- Continue amlodipine 10 mg daily, losartan 100 mg daily, and metoprolol succinate 100 mg daily  - Currently blood pressure is controlled at this time  Reviewed BP target goal with patient  - Continue to maintain healthy balanced diet with focus on low salt intake  Limit alcohol intake  - Advised to exercise at least 30 minutes a day for at least 5 days out of the week  [id number]

## 2024-10-02 DIAGNOSIS — R35.0 URINARY FREQUENCY: ICD-10-CM

## 2024-10-02 RX ORDER — OXYBUTYNIN CHLORIDE 10 MG/1
10 TABLET, EXTENDED RELEASE ORAL
Qty: 90 TABLET | Refills: 2 | Status: SHIPPED | OUTPATIENT
Start: 2024-10-02

## 2024-12-06 ENCOUNTER — OFFICE VISIT (OUTPATIENT)
Dept: FAMILY MEDICINE CLINIC | Facility: CLINIC | Age: 64
End: 2024-12-06

## 2024-12-06 VITALS
HEIGHT: 63 IN | TEMPERATURE: 98.6 F | WEIGHT: 102 LBS | OXYGEN SATURATION: 99 % | SYSTOLIC BLOOD PRESSURE: 130 MMHG | HEART RATE: 64 BPM | RESPIRATION RATE: 18 BRPM | DIASTOLIC BLOOD PRESSURE: 80 MMHG | BODY MASS INDEX: 18.07 KG/M2

## 2024-12-06 DIAGNOSIS — G62.9 NEUROPATHY: Chronic | ICD-10-CM

## 2024-12-06 DIAGNOSIS — M54.2 NECK PAIN: ICD-10-CM

## 2024-12-06 DIAGNOSIS — R06.02 SHORTNESS OF BREATH: ICD-10-CM

## 2024-12-06 DIAGNOSIS — R35.0 URINARY FREQUENCY: ICD-10-CM

## 2024-12-06 DIAGNOSIS — I10 ESSENTIAL HYPERTENSION: Chronic | ICD-10-CM

## 2024-12-06 DIAGNOSIS — M25.511 ACUTE PAIN OF RIGHT SHOULDER: ICD-10-CM

## 2024-12-06 DIAGNOSIS — R63.0 POOR APPETITE: Chronic | ICD-10-CM

## 2024-12-06 DIAGNOSIS — M62.838 TRAPEZIUS MUSCLE SPASM: ICD-10-CM

## 2024-12-06 DIAGNOSIS — R63.6 UNDERWEIGHT: Chronic | ICD-10-CM

## 2024-12-06 DIAGNOSIS — R73.03 PREDIABETES: Primary | Chronic | ICD-10-CM

## 2024-12-06 PROCEDURE — 3075F SYST BP GE 130 - 139MM HG: CPT | Performed by: PHYSICIAN ASSISTANT

## 2024-12-06 PROCEDURE — 3079F DIAST BP 80-89 MM HG: CPT | Performed by: PHYSICIAN ASSISTANT

## 2024-12-06 PROCEDURE — 99213 OFFICE O/P EST LOW 20 MIN: CPT | Performed by: PHYSICIAN ASSISTANT

## 2024-12-06 RX ORDER — OXYBUTYNIN CHLORIDE 10 MG/1
10 TABLET, EXTENDED RELEASE ORAL
Qty: 90 TABLET | Refills: 2 | Status: SHIPPED | OUTPATIENT
Start: 2024-12-06

## 2024-12-06 RX ORDER — AMLODIPINE BESYLATE 10 MG/1
10 TABLET ORAL DAILY
Qty: 90 TABLET | Refills: 1 | Status: SHIPPED | OUTPATIENT
Start: 2024-12-06

## 2024-12-06 RX ORDER — ALBUTEROL SULFATE 90 UG/1
2 INHALANT RESPIRATORY (INHALATION) EVERY 6 HOURS PRN
Qty: 8 G | Refills: 2 | Status: SHIPPED | OUTPATIENT
Start: 2024-12-06

## 2024-12-06 RX ORDER — CYPROHEPTADINE HYDROCHLORIDE 4 MG/1
4 TABLET ORAL 2 TIMES DAILY
Qty: 180 TABLET | Refills: 1 | Status: SHIPPED | OUTPATIENT
Start: 2024-12-06

## 2024-12-06 RX ORDER — IBUPROFEN 400 MG/1
400 TABLET, FILM COATED ORAL EVERY 8 HOURS
Qty: 30 TABLET | Refills: 1 | Status: SHIPPED | OUTPATIENT
Start: 2024-12-06

## 2024-12-06 RX ORDER — GABAPENTIN 800 MG/1
800 TABLET ORAL
Qty: 90 TABLET | Refills: 1 | Status: SHIPPED | OUTPATIENT
Start: 2024-12-06

## 2024-12-06 RX ORDER — LOSARTAN POTASSIUM 100 MG/1
100 TABLET ORAL DAILY
Qty: 90 TABLET | Refills: 1 | Status: SHIPPED | OUTPATIENT
Start: 2024-12-06

## 2024-12-06 RX ORDER — METOPROLOL SUCCINATE 100 MG/1
100 TABLET, EXTENDED RELEASE ORAL DAILY
Qty: 90 TABLET | Refills: 1 | Status: SHIPPED | OUTPATIENT
Start: 2024-12-06

## 2024-12-06 RX ORDER — CYCLOBENZAPRINE HCL 10 MG
10 TABLET ORAL 2 TIMES DAILY PRN
Qty: 60 TABLET | Refills: 1 | Status: SHIPPED | OUTPATIENT
Start: 2024-12-06

## 2024-12-06 NOTE — ASSESSMENT & PLAN NOTE
- Patient has good appetite when taking cyproheptadine.  Weight has decreased over the past few months which patient attributes to being very busy and stressed.    Body mass index is 18.07 kg/m².  - Patient wishes to continue with cyproheptadine. Continue drinking Ensure as needed.  Will continue to monitor.    Wt Readings from Last 3 Encounters:   12/06/24 46.3 kg (102 lb)   07/10/24 49.9 kg (110 lb)   04/26/24 49.9 kg (110 lb)

## 2024-12-06 NOTE — ASSESSMENT & PLAN NOTE
-Continue Flexeril 10 mg daily at bedtime as needed.  - Continue ibuprofen 40 mg, every 8 hours as needed.  -Advised to apply ice/heating pad/topical therapies as needed to affected area.  - Provided patient with list of exercises to perform daily.  - If symptoms persist/worsen, would recommend referral to physical therapy.  Orders:    cyclobenzaprine (FLEXERIL) 10 mg tablet; Take 1 tablet (10 mg total) by mouth 2 (two) times a day as needed for muscle spasms

## 2024-12-06 NOTE — ASSESSMENT & PLAN NOTE
- Continue amlodipine 10 mg daily, losartan 100 mg daily, and metoprolol succinate 100 mg daily.  -Blood pressure slightly elevated today.  Reviewed BP target goal with patient.    - Continue to maintain healthy balanced diet with focus on low salt intake. Limit alcohol intake.   - Advised to exercise at least 30 minutes a day for at least 5 days out of the week.     Orders:    amLODIPine (NORVASC) 10 mg tablet; Take 1 tablet (10 mg total) by mouth daily    losartan (COZAAR) 100 MG tablet; Take 1 tablet (100 mg total) by mouth daily    metoprolol succinate (TOPROL-XL) 100 mg 24 hr tablet; Take 1 tablet (100 mg total) by mouth daily

## 2024-12-06 NOTE — ASSESSMENT & PLAN NOTE
Orders:    ibuprofen (MOTRIN) 400 mg tablet; Take 1 tablet (400 mg total) by mouth every 8 (eight) hours

## 2024-12-06 NOTE — ASSESSMENT & PLAN NOTE
"- See plan for \"underweight\"    Orders:    cyproheptadine (PERIACTIN) 4 mg tablet; Take 1 tablet (4 mg total) by mouth 2 (two) times a day    "

## 2024-12-06 NOTE — ASSESSMENT & PLAN NOTE
- Much improved with increased dose.  Continue oxybutynin 10 mg daily.    Orders:    oxybutynin (DITROPAN-XL) 10 MG 24 hr tablet; Take 1 tablet (10 mg total) by mouth daily at bedtime

## 2024-12-06 NOTE — ASSESSMENT & PLAN NOTE
-  Stable and much improved since starting gabapentin and wearing wrist splints.   - Continue nighttime wrist splints.  -Continue gabapentin 800 mg daily bedtime.    Orders:    gabapentin (NEURONTIN) 800 mg tablet; Take 1 tablet (800 mg total) by mouth daily at bedtime

## 2024-12-06 NOTE — PROGRESS NOTES
Name: Carmen Perez      : 1960      MRN: [id number]  Encounter Provider: Mary Lou Lynch PA-C  Encounter Date: 2024   Encounter department: [facility]  :  Assessment & Plan  Acute pain of right shoulder  -Continue Flexeril 10 mg daily at bedtime as needed.  - Continue ibuprofen 40 mg, every 8 hours as needed.  -Advised to apply ice/heating pad/topical therapies as needed to affected area.  - Provided patient with list of exercises to perform daily.  - If symptoms persist/worsen, would recommend referral to physical therapy.  Orders:    cyclobenzaprine (FLEXERIL) 10 mg tablet; Take 1 tablet (10 mg total) by mouth 2 (two) times a day as needed for muscle spasms    Essential hypertension  - Continue amlodipine 10 mg daily, losartan 100 mg daily, and metoprolol succinate 100 mg daily.  -Blood pressure slightly elevated today.  Reviewed BP target goal with patient.    - Continue to maintain healthy balanced diet with focus on low salt intake. Limit alcohol intake.   - Advised to exercise at least 30 minutes a day for at least 5 days out of the week.     Orders:    amLODIPine (NORVASC) 10 mg tablet; Take 1 tablet (10 mg total) by mouth daily    losartan (COZAAR) 100 MG tablet; Take 1 tablet (100 mg total) by mouth daily    metoprolol succinate (TOPROL-XL) 100 mg 24 hr tablet; Take 1 tablet (100 mg total) by mouth daily    Neck pain    Orders:    ibuprofen (MOTRIN) 400 mg tablet; Take 1 tablet (400 mg total) by mouth every 8 (eight) hours    Trapezius muscle spasm    Orders:    ibuprofen (MOTRIN) 400 mg tablet; Take 1 tablet (400 mg total) by mouth every 8 (eight) hours    Neuropathy  -  Stable and much improved since starting gabapentin and wearing wrist splints.   - Continue nighttime wrist splints.  -Continue gabapentin 800 mg daily bedtime.    Orders:    gabapentin (NEURONTIN) 800 mg tablet; Take 1 tablet (800 mg total) by mouth daily at bedtime    Poor  "appetite  - See plan for \"underweight\"    Orders:    cyproheptadine (PERIACTIN) 4 mg tablet; Take 1 tablet (4 mg total) by mouth 2 (two) times a day    Shortness of breath    Orders:    albuterol (Ventolin HFA) 90 mcg/act inhaler; Inhale 2 puffs every 6 (six) hours as needed for wheezing or shortness of breath    Urinary frequency  - Much improved with increased dose.  Continue oxybutynin 10 mg daily.    Orders:    oxybutynin (DITROPAN-XL) 10 MG 24 hr tablet; Take 1 tablet (10 mg total) by mouth daily at bedtime    Prediabetes  - Reviewed recent hemoglobin A1c of 6.2.  Patient now within prediabetic range.  This is a new diagnosis for patient.  - Advised to follow diabetic diet with focus on low intake of carbohydrates and sugars.    Lab Results   Component Value Date    HGBA1C 6.2 (H) 01/10/2024              Underweight  - Patient has good appetite when taking cyproheptadine.  Weight has decreased over the past few months which patient attributes to being very busy and stressed.    Body mass index is 18.07 kg/m².  - Patient wishes to continue with cyproheptadine. Continue drinking Ensure as needed.  Will continue to monitor.    Wt Readings from Last 3 Encounters:   12/06/24 46.3 kg (102 lb)   07/10/24 49.9 kg (110 lb)   04/26/24 49.9 kg (110 lb)                  BMI Counseling: Body mass index is 18.07 kg/m². The BMI is below normal. Patient advised to gain weight and dietary education for weight gain was provided. Rationale for BMI follow-up plan is due to patient being underweight.     Depression Screening and Follow-up Plan: Patient was screened for depression during today's encounter. They screened negative with a PHQ-2 score of 0.      History of Present Illness     Patient is a 64 y.o. female whom  has a past medical history of Asthma, History of transfusion, and Hypertension. who is seen today in office for HTN follow up.    -Patient notes her urinary frequency has much improved since starting the increased " dose of oxybutynin.  Patient notes her right shoulder continues to give her some pain at times.  Patient notes she generally takes ibuprofen which helps.  Patient notes the other day she did hit her upper right arm against the shower and caused some bruising.      Review of Systems   Constitutional:  Negative for chills, fatigue and fever.   HENT:  Negative for congestion, ear pain, rhinorrhea and sore throat.    Eyes:  Negative for pain and visual disturbance.   Respiratory:  Negative for cough, chest tightness and shortness of breath.    Cardiovascular:  Negative for chest pain and palpitations.   Gastrointestinal:  Negative for abdominal pain, blood in stool, constipation, diarrhea, nausea and vomiting.   Genitourinary:  Negative for difficulty urinating, dysuria, frequency, hematuria, pelvic pain and urgency.   Musculoskeletal:  Positive for arthralgias (right shoulder pain).   Skin:  Negative for rash.   Neurological:  Negative for dizziness and headaches.   Psychiatric/Behavioral:  Negative for behavioral problems. The patient is not nervous/anxious.      Pertinent Medical History     Medical History Reviewed by provider this encounter:     .  Past Medical History   Past Medical History:   Diagnosis Date    Asthma     History of transfusion     Hypertension      Past Surgical History:   Procedure Laterality Date    BREAST BIOPSY Right     benign     SECTION      SPINE SURGERY       Family History   Problem Relation Age of Onset    Colon cancer Mother     Cancer Mother     No Known Problems Father     Stomach cancer Sister     Colon cancer Sister          age 75    No Known Problems Daughter     No Known Problems Maternal Grandmother     No Known Problems Maternal Grandfather     No Known Problems Paternal Grandmother     No Known Problems Paternal Grandfather     Liver cancer Brother          age 78    Cancer Brother     Stomach cancer Son     Breast cancer Neg Hx       reports that she has  never smoked. She has never been exposed to tobacco smoke. She has never used smokeless tobacco. She reports current alcohol use. She reports that she does not use drugs.  Current Outpatient Medications on File Prior to Visit   Medication Sig Dispense Refill    polyethylene glycol (GOLYTELY) 4000 mL solution Take as directed by the office. 4000 mL 0    [DISCONTINUED] albuterol (Ventolin HFA) 90 mcg/act inhaler Inhale 2 puffs every 6 (six) hours as needed for wheezing or shortness of breath 8 g 2    [DISCONTINUED] amLODIPine (NORVASC) 10 mg tablet Take 1 tablet (10 mg total) by mouth daily 90 tablet 1    [DISCONTINUED] cyclobenzaprine (FLEXERIL) 10 mg tablet Take 1 tablet (10 mg total) by mouth 2 (two) times a day as needed for muscle spasms 60 tablet 1    [DISCONTINUED] cyproheptadine (PERIACTIN) 4 mg tablet Take 1 tablet (4 mg total) by mouth 2 (two) times a day 180 tablet 1    [DISCONTINUED] gabapentin (NEURONTIN) 800 mg tablet Take 1 tablet (800 mg total) by mouth daily at bedtime 90 tablet 1    [DISCONTINUED] ibuprofen (MOTRIN) 400 mg tablet Take 1 tablet (400 mg total) by mouth every 8 (eight) hours 30 tablet 1    [DISCONTINUED] losartan (COZAAR) 100 MG tablet Take 1 tablet (100 mg total) by mouth daily 90 tablet 1    [DISCONTINUED] metoprolol succinate (TOPROL-XL) 100 mg 24 hr tablet Take 1 tablet (100 mg total) by mouth daily 90 tablet 1    [DISCONTINUED] oxybutynin (DITROPAN-XL) 10 MG 24 hr tablet take 1 tablet by mouth once daily at bedtime 90 tablet 2     No current facility-administered medications on file prior to visit.     Allergies   Allergen Reactions    Aspirin Swelling     Patient jaison toradol 15mg at ed    Penicillins Swelling      Current Outpatient Medications on File Prior to Visit   Medication Sig Dispense Refill    polyethylene glycol (GOLYTELY) 4000 mL solution Take as directed by the office. 4000 mL 0    [DISCONTINUED] albuterol (Ventolin HFA) 90 mcg/act inhaler Inhale 2 puffs every 6 (six)  "hours as needed for wheezing or shortness of breath 8 g 2    [DISCONTINUED] amLODIPine (NORVASC) 10 mg tablet Take 1 tablet (10 mg total) by mouth daily 90 tablet 1    [DISCONTINUED] cyclobenzaprine (FLEXERIL) 10 mg tablet Take 1 tablet (10 mg total) by mouth 2 (two) times a day as needed for muscle spasms 60 tablet 1    [DISCONTINUED] cyproheptadine (PERIACTIN) 4 mg tablet Take 1 tablet (4 mg total) by mouth 2 (two) times a day 180 tablet 1    [DISCONTINUED] gabapentin (NEURONTIN) 800 mg tablet Take 1 tablet (800 mg total) by mouth daily at bedtime 90 tablet 1    [DISCONTINUED] ibuprofen (MOTRIN) 400 mg tablet Take 1 tablet (400 mg total) by mouth every 8 (eight) hours 30 tablet 1    [DISCONTINUED] losartan (COZAAR) 100 MG tablet Take 1 tablet (100 mg total) by mouth daily 90 tablet 1    [DISCONTINUED] metoprolol succinate (TOPROL-XL) 100 mg 24 hr tablet Take 1 tablet (100 mg total) by mouth daily 90 tablet 1    [DISCONTINUED] oxybutynin (DITROPAN-XL) 10 MG 24 hr tablet take 1 tablet by mouth once daily at bedtime 90 tablet 2     No current facility-administered medications on file prior to visit.      Social History     Tobacco Use    Smoking status: Never     Passive exposure: Never    Smokeless tobacco: Never   Vaping Use    Vaping status: Never Used   Substance and Sexual Activity    Alcohol use: Yes     Comment: social    Drug use: Never    Sexual activity: Yes     Partners: Male        Objective   /80 (BP Location: Right arm, Patient Position: Sitting, Cuff Size: Standard)   Pulse 64   Temp 98.6 °F (37 °C) (Temporal)   Resp 18   Ht 5' 3\" (1.6 m)   Wt 46.3 kg (102 lb)   SpO2 99%   BMI 18.07 kg/m²      Physical Exam  Vitals and nursing note reviewed.   Constitutional:       General: She is not in acute distress.     Appearance: She is well-developed.   HENT:      Head: Normocephalic and atraumatic.      Right Ear: External ear normal.      Left Ear: External ear normal.      Nose: Nose normal.      " Mouth/Throat:      Pharynx: Uvula midline.   Eyes:      Conjunctiva/sclera: Conjunctivae normal.   Cardiovascular:      Rate and Rhythm: Normal rate and regular rhythm.      Pulses: Normal pulses.      Heart sounds: Normal heart sounds. No murmur heard.  Pulmonary:      Effort: Pulmonary effort is normal. No respiratory distress.      Breath sounds: Normal breath sounds. No wheezing.   Musculoskeletal:      Right shoulder: Tenderness present. No swelling. Normal range of motion.      Cervical back: Normal range of motion and neck supple.      Comments: Ecchymosis noted of right upper arm.   Skin:     General: Skin is warm.   Neurological:      Mental Status: She is alert and oriented to person, place, and time.   Psychiatric:         Speech: Speech normal.         Behavior: Behavior normal.

## 2025-01-24 ENCOUNTER — OFFICE VISIT (OUTPATIENT)
Dept: FAMILY MEDICINE CLINIC | Facility: CLINIC | Age: 65
End: 2025-01-24

## 2025-01-24 VITALS
HEART RATE: 64 BPM | TEMPERATURE: 97.1 F | DIASTOLIC BLOOD PRESSURE: 70 MMHG | WEIGHT: 104.2 LBS | BODY MASS INDEX: 18.46 KG/M2 | HEIGHT: 63 IN | OXYGEN SATURATION: 99 % | RESPIRATION RATE: 14 BRPM | SYSTOLIC BLOOD PRESSURE: 130 MMHG

## 2025-01-24 DIAGNOSIS — J06.9 VIRAL URI: Primary | ICD-10-CM

## 2025-01-24 DIAGNOSIS — G62.9 NEUROPATHY: Chronic | ICD-10-CM

## 2025-01-24 DIAGNOSIS — R63.0 POOR APPETITE: Chronic | ICD-10-CM

## 2025-01-24 DIAGNOSIS — R73.03 PREDIABETES: Chronic | ICD-10-CM

## 2025-01-24 DIAGNOSIS — I10 ESSENTIAL HYPERTENSION: Chronic | ICD-10-CM

## 2025-01-24 DIAGNOSIS — R63.6 UNDERWEIGHT: Chronic | ICD-10-CM

## 2025-01-24 PROCEDURE — 3078F DIAST BP <80 MM HG: CPT | Performed by: PHYSICIAN ASSISTANT

## 2025-01-24 PROCEDURE — 3075F SYST BP GE 130 - 139MM HG: CPT | Performed by: PHYSICIAN ASSISTANT

## 2025-01-24 PROCEDURE — 99214 OFFICE O/P EST MOD 30 MIN: CPT | Performed by: PHYSICIAN ASSISTANT

## 2025-01-24 RX ORDER — BENZONATATE 100 MG/1
100 CAPSULE ORAL 3 TIMES DAILY PRN
Qty: 30 CAPSULE | Refills: 1 | Status: SHIPPED | OUTPATIENT
Start: 2025-01-24

## 2025-01-24 NOTE — PROGRESS NOTES
"Name: Carmen Perez      : 1960      MRN: [id number]  Encounter Provider: Mary Lou Lynch PA-C  Encounter Date: 2025   Encounter department: [facility]  :  Assessment & Plan  Viral URI    Orders:    benzonatate (TESSALON PERLES) 100 mg capsule; Take 1 capsule (100 mg total) by mouth 3 (three) times a day as needed for cough    Essential hypertension  - Continue amlodipine 10 mg daily, losartan 100 mg daily, and metoprolol succinate 100 mg daily.  -Blood pressure slightly elevated today.  Reviewed BP target goal with patient.    - Continue to maintain healthy balanced diet with focus on low salt intake. Limit alcohol intake.   - Advised to exercise at least 30 minutes a day for at least 5 days out of the week.            Neuropathy  -  Stable and much improved since starting gabapentin and wearing wrist splints.   - Continue nighttime wrist splints.  -Continue gabapentin 800 mg daily bedtime.           Poor appetite  - See plan for \"underweight\"           Underweight  - Patient has good appetite when taking cyproheptadine.  Weight has decreased over the past few months which patient attributes to being very busy and stressed.    Body mass index is 18.46 kg/m².  - Patient wishes to continue with cyproheptadine. Continue drinking Ensure as needed.  Will continue to monitor.    Wt Readings from Last 3 Encounters:   25 47.3 kg (104 lb 3.2 oz)   24 46.3 kg (102 lb)   07/10/24 49.9 kg (110 lb)            Prediabetes  - Reviewed recent hemoglobin A1c of 6.2.  Patient now within prediabetic range.  This is a new diagnosis for patient.  - Advised to follow diabetic diet with focus on low intake of carbohydrates and sugars.    Lab Results   Component Value Date    HGBA1C 6.2 (H) 01/10/2024                    BMI Counseling: Body mass index is 18.46 kg/m². The BMI is below normal. Patient advised to gain weight. Rationale for BMI follow-up plan is due to " "patient being underweight.       History of Present Illness     Patient is a 64 y.o. female whom  has a past medical history of Asthma, History of transfusion, and Hypertension. who is seen today in office for appetite follow up patient is accompanied today by her significant other who helps with translation From Ecu[place]orean to English.    -Patient notes recently her appetite has not been the greatest.  She denies any new concerns today.      Review of Systems   Constitutional:  Negative for chills, fatigue and fever.   HENT:  Negative for congestion and sore throat.    Eyes:  Negative for pain and visual disturbance.   Respiratory:  Negative for cough, chest tightness and shortness of breath.    Cardiovascular:  Negative for chest pain and palpitations.   Gastrointestinal:  Negative for abdominal pain, blood in stool, constipation, diarrhea, nausea and vomiting.   Genitourinary:  Negative for difficulty urinating and dysuria.   Musculoskeletal:  Positive for arthralgias.   Skin:  Negative for rash.   Neurological:  Negative for dizziness and headaches.   Psychiatric/Behavioral:  The patient is not nervous/anxious.        Objective   /70 (BP Location: Left arm, Patient Position: Sitting, Cuff Size: Standard)   Pulse 64   Temp (!) 97.1 °F (36.2 °C) (Temporal)   Resp 14   Ht 5' 3\" (1.6 m)   Wt 47.3 kg (104 lb 3.2 oz)   SpO2 99%   BMI 18.46 kg/m²      Physical Exam  Vitals and nursing note reviewed.   Constitutional:       General: She is not in acute distress.     Appearance: She is well-developed.   HENT:      Head: Normocephalic and atraumatic.      Right Ear: External ear normal.      Left Ear: External ear normal.      Nose: Nose normal.      Mouth/Throat:      Pharynx: Uvula midline.   Eyes:      Conjunctiva/sclera: Conjunctivae normal.   Cardiovascular:      Rate and Rhythm: Normal rate and regular rhythm.      Pulses: Normal pulses.      Heart sounds: Normal heart sounds. No murmur heard.  Pulmonary: "      Effort: Pulmonary effort is normal. No respiratory distress.      Breath sounds: Normal breath sounds. No wheezing.   Musculoskeletal:      Cervical back: Normal range of motion and neck supple.   Skin:     General: Skin is warm.   Neurological:      Mental Status: She is alert and oriented to person, place, and time.   Psychiatric:         Speech: Speech normal.         Behavior: Behavior normal.

## 2025-01-27 NOTE — ASSESSMENT & PLAN NOTE
- Patient has good appetite when taking cyproheptadine.  Weight has decreased over the past few months which patient attributes to being very busy and stressed.    Body mass index is 18.46 kg/m².  - Patient wishes to continue with cyproheptadine. Continue drinking Ensure as needed.  Will continue to monitor.    Wt Readings from Last 3 Encounters:   01/24/25 47.3 kg (104 lb 3.2 oz)   12/06/24 46.3 kg (102 lb)   07/10/24 49.9 kg (110 lb)

## 2025-04-01 ENCOUNTER — OFFICE VISIT (OUTPATIENT)
Dept: FAMILY MEDICINE CLINIC | Facility: CLINIC | Age: 65
End: 2025-04-01

## 2025-04-01 VITALS
HEIGHT: 63 IN | RESPIRATION RATE: 14 BRPM | HEART RATE: 61 BPM | OXYGEN SATURATION: 97 % | SYSTOLIC BLOOD PRESSURE: 110 MMHG | BODY MASS INDEX: 19.1 KG/M2 | WEIGHT: 107.8 LBS | TEMPERATURE: 96.8 F | DIASTOLIC BLOOD PRESSURE: 70 MMHG

## 2025-04-01 DIAGNOSIS — R06.02 SHORTNESS OF BREATH: ICD-10-CM

## 2025-04-01 DIAGNOSIS — I10 ESSENTIAL HYPERTENSION: Chronic | ICD-10-CM

## 2025-04-01 DIAGNOSIS — R63.0 POOR APPETITE: Chronic | ICD-10-CM

## 2025-04-01 DIAGNOSIS — R73.03 PREDIABETES: Primary | Chronic | ICD-10-CM

## 2025-04-01 DIAGNOSIS — R63.6 UNDERWEIGHT: Chronic | ICD-10-CM

## 2025-04-01 DIAGNOSIS — G62.9 NEUROPATHY: Chronic | ICD-10-CM

## 2025-04-01 PROCEDURE — 3078F DIAST BP <80 MM HG: CPT | Performed by: PHYSICIAN ASSISTANT

## 2025-04-01 PROCEDURE — 99214 OFFICE O/P EST MOD 30 MIN: CPT | Performed by: PHYSICIAN ASSISTANT

## 2025-04-01 PROCEDURE — 3074F SYST BP LT 130 MM HG: CPT | Performed by: PHYSICIAN ASSISTANT

## 2025-04-01 RX ORDER — CYPROHEPTADINE HYDROCHLORIDE 4 MG/1
4 TABLET ORAL 2 TIMES DAILY
Qty: 180 TABLET | Refills: 1 | Status: SHIPPED | OUTPATIENT
Start: 2025-04-01

## 2025-04-01 RX ORDER — ALBUTEROL SULFATE 90 UG/1
2 INHALANT RESPIRATORY (INHALATION) EVERY 6 HOURS PRN
Qty: 8 G | Refills: 2 | Status: SHIPPED | OUTPATIENT
Start: 2025-04-01

## 2025-04-01 RX ORDER — GABAPENTIN 800 MG/1
800 TABLET ORAL
Qty: 90 TABLET | Refills: 1 | Status: SHIPPED | OUTPATIENT
Start: 2025-04-01

## 2025-04-01 RX ORDER — AMLODIPINE BESYLATE 10 MG/1
10 TABLET ORAL DAILY
Qty: 90 TABLET | Refills: 1 | Status: SHIPPED | OUTPATIENT
Start: 2025-04-01

## 2025-04-01 RX ORDER — METOPROLOL SUCCINATE 100 MG/1
100 TABLET, EXTENDED RELEASE ORAL DAILY
Qty: 90 TABLET | Refills: 1 | Status: SHIPPED | OUTPATIENT
Start: 2025-04-01

## 2025-04-01 RX ORDER — LOSARTAN POTASSIUM 100 MG/1
100 TABLET ORAL DAILY
Qty: 90 TABLET | Refills: 1 | Status: SHIPPED | OUTPATIENT
Start: 2025-04-01

## 2025-04-01 NOTE — PROGRESS NOTES
"Name: Carmen Perez      : 1960      MRN: [id number]  Encounter Provider: Mary Lou Lynch PA-C  Encounter Date: 2025   Encounter department: [facility]  :  Assessment & Plan  Neuropathy  - Stable and much improved since starting gabapentin and wearing wrist splints.   - Continue nighttime wrist splints.  -Continue gabapentin 800 mg daily bedtime.  Orders:    gabapentin (NEURONTIN) 800 mg tablet; Take 1 tablet (800 mg total) by mouth daily at bedtime    Poor appetite  - See plan for \"underweight\"   Orders:    cyproheptadine (PERIACTIN) 4 mg tablet; Take 1 tablet (4 mg total) by mouth 2 (two) times a day    Shortness of breath    Orders:    albuterol (Ventolin HFA) 90 mcg/act inhaler; Inhale 2 puffs every 6 (six) hours as needed for wheezing or shortness of breath    Essential hypertension  - Continue amlodipine 10 mg daily, losartan 100 mg daily, and metoprolol succinate 100 mg daily.  -Blood pressure slightly elevated today.  Reviewed BP target goal with patient.    - Continue to maintain healthy balanced diet with focus on low salt intake. Limit alcohol intake.   - Advised to exercise at least 30 minutes a day for at least 5 days out of the week.      Orders:    amLODIPine (NORVASC) 10 mg tablet; Take 1 tablet (10 mg total) by mouth daily    losartan (COZAAR) 100 MG tablet; Take 1 tablet (100 mg total) by mouth daily    metoprolol succinate (TOPROL-XL) 100 mg 24 hr tablet; Take 1 tablet (100 mg total) by mouth daily    Prediabetes  - Reviewed recent hemoglobin A1c of 6.2.  Patient now within prediabetic range.  This is a new diagnosis for patient.  - Advised to follow diabetic diet with focus on low intake of carbohydrates and sugars.    Lab Results   Component Value Date    HGBA1C 6.2 (H) 01/10/2024                  Underweight  - Patient reports some decreased appetite, but has been taking cyproheptadine only once a day.  Recommend increasing to at " "least twice a day, but could also go up to 3 times a day.  Body mass index is 19.1 kg/m².  - Continue drinking Ensure as needed.  Will continue to monitor.    Wt Readings from Last 3 Encounters:   04/01/25 48.9 kg (107 lb 12.8 oz)   01/24/25 47.3 kg (104 lb 3.2 oz)   12/06/24 46.3 kg (102 lb)                     History of Present Illness       Patient is a 64 y.o. female whom  has a past medical history of Asthma, History of transfusion, and Hypertension. who is seen today in office for hypertension follow up.    -Patient notes recently her brother passed away.  Patient notes recently her appetite has been slightly decreased.  She has been taking Suprep to [facility] once a day.  Patient denies any associated abdominal pain, nausea, vomiting, constipation.  Patient notes she did have some diarrhea the other day when she ate chocolate.  Patient notes she generally has diarrhea if she eats chocolate or cheese.      Review of Systems   Constitutional:  Positive for appetite change (Decreased). Negative for chills, fatigue and fever.   HENT:  Negative for congestion and sore throat.    Eyes:  Negative for pain and visual disturbance.   Respiratory:  Negative for cough, chest tightness and shortness of breath.    Cardiovascular:  Negative for chest pain and palpitations.   Gastrointestinal:  Positive for diarrhea (When eating chocolate or cheese). Negative for abdominal pain, blood in stool, constipation, nausea and vomiting.   Genitourinary:  Negative for difficulty urinating and dysuria.   Musculoskeletal:  Positive for arthralgias.   Skin:  Negative for rash.   Neurological:  Negative for dizziness and headaches.   Psychiatric/Behavioral:  The patient is not nervous/anxious.        Objective   /70 (BP Location: Right arm, Patient Position: Sitting, Cuff Size: Adult)   Pulse 61   Temp (!) 96.8 °F (36 °C) (Temporal)   Resp 14   Ht 5' 3\" (1.6 m)   Wt 48.9 kg (107 lb 12.8 oz)   SpO2 97%   BMI 19.10 kg/m²    "   Physical Exam  Vitals and nursing note reviewed.   Constitutional:       General: She is not in acute distress.     Appearance: She is well-developed.   HENT:      Head: Normocephalic and atraumatic.      Right Ear: External ear normal.      Left Ear: External ear normal.      Nose: Nose normal.      Mouth/Throat:      Pharynx: Uvula midline.   Eyes:      Conjunctiva/sclera: Conjunctivae normal.   Cardiovascular:      Rate and Rhythm: Normal rate and regular rhythm.      Pulses: Normal pulses.      Heart sounds: Normal heart sounds. No murmur heard.  Pulmonary:      Effort: Pulmonary effort is normal. No respiratory distress.      Breath sounds: Normal breath sounds. No wheezing.   Musculoskeletal:      Cervical back: Normal range of motion and neck supple.   Skin:     General: Skin is warm.   Neurological:      Mental Status: She is alert and oriented to person, place, and time.   Psychiatric:         Speech: Speech normal.         Behavior: Behavior normal.

## 2025-04-02 NOTE — ASSESSMENT & PLAN NOTE
- Patient reports some decreased appetite, but has been taking cyproheptadine only once a day.  Recommend increasing to at least twice a day, but could also go up to 3 times a day.  Body mass index is 19.1 kg/m².  - Continue drinking Ensure as needed.  Will continue to monitor.    Wt Readings from Last 3 Encounters:   04/01/25 48.9 kg (107 lb 12.8 oz)   01/24/25 47.3 kg (104 lb 3.2 oz)   12/06/24 46.3 kg (102 lb)

## 2025-04-07 ENCOUNTER — VBI (OUTPATIENT)
Dept: ADMINISTRATIVE | Facility: OTHER | Age: 65
End: 2025-04-07

## 2025-04-07 NOTE — TELEPHONE ENCOUNTER
04/07/25 8:55 AM     Chart reviewed for Pap Smear (HPV) aka Cervical Cancer Screening ; nothing is submitted to the patient's insurance at this time.     Paige Montoya   [facility]

## 2025-04-08 DIAGNOSIS — M25.511 ACUTE PAIN OF RIGHT SHOULDER: ICD-10-CM

## 2025-04-08 DIAGNOSIS — M54.2 NECK PAIN: ICD-10-CM

## 2025-04-08 DIAGNOSIS — M62.838 TRAPEZIUS MUSCLE SPASM: ICD-10-CM

## 2025-04-08 RX ORDER — IBUPROFEN 400 MG/1
400 TABLET, FILM COATED ORAL EVERY 8 HOURS
Qty: 30 TABLET | Refills: 1 | Status: SHIPPED | OUTPATIENT
Start: 2025-04-08

## 2025-04-08 RX ORDER — CYCLOBENZAPRINE HCL 10 MG
TABLET ORAL
Qty: 60 TABLET | Refills: 1 | Status: SHIPPED | OUTPATIENT
Start: 2025-04-08

## 2025-05-16 DIAGNOSIS — J06.9 VIRAL URI: ICD-10-CM

## 2025-05-16 RX ORDER — BENZONATATE 100 MG/1
100 CAPSULE ORAL 3 TIMES DAILY PRN
Qty: 30 CAPSULE | Refills: 1 | OUTPATIENT
Start: 2025-05-16

## 2025-06-02 ENCOUNTER — OFFICE VISIT (OUTPATIENT)
Dept: FAMILY MEDICINE CLINIC | Facility: CLINIC | Age: 65
End: 2025-06-02

## 2025-06-02 VITALS
HEIGHT: 63 IN | SYSTOLIC BLOOD PRESSURE: 102 MMHG | WEIGHT: 102 LBS | HEART RATE: 65 BPM | BODY MASS INDEX: 18.07 KG/M2 | RESPIRATION RATE: 18 BRPM | DIASTOLIC BLOOD PRESSURE: 70 MMHG | OXYGEN SATURATION: 97 % | TEMPERATURE: 98.4 F

## 2025-06-02 DIAGNOSIS — R63.0 POOR APPETITE: Chronic | ICD-10-CM

## 2025-06-02 DIAGNOSIS — Z12.31 ENCOUNTER FOR SCREENING MAMMOGRAM FOR BREAST CANCER: ICD-10-CM

## 2025-06-02 DIAGNOSIS — R35.0 URINARY FREQUENCY: Chronic | ICD-10-CM

## 2025-06-02 DIAGNOSIS — R63.6 UNDERWEIGHT: Chronic | ICD-10-CM

## 2025-06-02 DIAGNOSIS — I10 ESSENTIAL HYPERTENSION: Primary | Chronic | ICD-10-CM

## 2025-06-02 DIAGNOSIS — G62.9 NEUROPATHY: Chronic | ICD-10-CM

## 2025-06-02 PROCEDURE — 3078F DIAST BP <80 MM HG: CPT | Performed by: PHYSICIAN ASSISTANT

## 2025-06-02 PROCEDURE — 3074F SYST BP LT 130 MM HG: CPT | Performed by: PHYSICIAN ASSISTANT

## 2025-06-02 PROCEDURE — 99213 OFFICE O/P EST LOW 20 MIN: CPT | Performed by: PHYSICIAN ASSISTANT

## 2025-06-02 NOTE — ASSESSMENT & PLAN NOTE
- Patient reports some decreased appetite, but has been taking cyproheptadine only once a day.  Recommend increasing to at least twice a day, but could also go up to 3 times a day.  Body mass index is 18.07 kg/m².  - Continue drinking Ensure as needed.  Will continue to monitor.    Wt Readings from Last 3 Encounters:   06/02/25 46.3 kg (102 lb)   04/01/25 48.9 kg (107 lb 12.8 oz)   01/24/25 47.3 kg (104 lb 3.2 oz)

## 2025-06-02 NOTE — PROGRESS NOTES
"Name: Carmen Perez      : 1960      MRN: [id number]  Encounter Provider: Mary Lou Lynch PA-C  Encounter Date: 2025   Encounter department: [facility]  :  Assessment & Plan  Essential hypertension  - Continue amlodipine 10 mg daily, losartan 100 mg daily, and metoprolol succinate 100 mg daily.  -Blood pressure stable. Reviewed BP target goal with patient.    - Continue to maintain healthy balanced diet with focus on low salt intake. Limit alcohol intake.   - Advised to exercise at least 30 minutes a day for at least 5 days out of the week.             Urinary frequency  - Much improved with increased dose.  Continue oxybutynin 10 mg daily.           Underweight  - Patient reports some decreased appetite, but has been taking cyproheptadine only once a day.  Recommend increasing to at least twice a day, but could also go up to 3 times a day.  Body mass index is 18.07 kg/m².  - Continue drinking Ensure as needed.  Will continue to monitor.    Wt Readings from Last 3 Encounters:   25 46.3 kg (102 lb)   25 48.9 kg (107 lb 12.8 oz)   25 47.3 kg (104 lb 3.2 oz)                Poor appetite  - See plan for \"underweight\"          Neuropathy  - Stable and much improved since starting gabapentin and wearing wrist splints.   - Continue nighttime wrist splints.  -Continue gabapentin 800 mg daily bedtime.         Encounter for screening mammogram for breast cancer    Orders:    Mammo screening bilateral w 3d and cad; Future           History of Present Illness     Patient is a 64 y.o. female whom  has a past medical history of Asthma, History of transfusion, and Hypertension. who is seen today in office for hypertension follow up.    - Patient denies any new concerns today.     Review of Systems   Constitutional:  Positive for appetite change (Decreased). Negative for chills, fatigue and fever.   HENT:  Negative for congestion and sore throat.  " "  Eyes:  Negative for pain and visual disturbance.   Respiratory:  Negative for cough, chest tightness and shortness of breath.    Cardiovascular:  Negative for chest pain and palpitations.   Gastrointestinal:  Negative for abdominal pain, blood in stool, constipation, nausea and vomiting.   Genitourinary:  Negative for difficulty urinating and dysuria.   Musculoskeletal:  Positive for arthralgias.   Skin:  Negative for rash.   Neurological:  Negative for dizziness and headaches.   Psychiatric/Behavioral:  The patient is not nervous/anxious.        Objective   /70 (BP Location: Right arm, Patient Position: Sitting, Cuff Size: Standard)   Pulse 65   Temp 98.4 °F (36.9 °C) (Temporal)   Resp 18   Ht 5' 3\" (1.6 m)   Wt 46.3 kg (102 lb)   SpO2 97%   BMI 18.07 kg/m²      Physical Exam  Vitals and nursing note reviewed.   Constitutional:       General: She is not in acute distress.     Appearance: She is well-developed.   HENT:      Head: Normocephalic and atraumatic.      Right Ear: External ear normal.      Left Ear: External ear normal.      Nose: Nose normal.      Mouth/Throat:      Pharynx: Uvula midline.     Eyes:      Conjunctiva/sclera: Conjunctivae normal.       Cardiovascular:      Rate and Rhythm: Normal rate and regular rhythm.      Pulses: Normal pulses.      Heart sounds: Normal heart sounds. No murmur heard.  Pulmonary:      Effort: Pulmonary effort is normal. No respiratory distress.      Breath sounds: Normal breath sounds. No wheezing.     Musculoskeletal:      Cervical back: Normal range of motion and neck supple.     Skin:     General: Skin is warm.     Neurological:      Mental Status: She is alert and oriented to person, place, and time.     Psychiatric:         Speech: Speech normal.         Behavior: Behavior normal.         "

## 2025-06-02 NOTE — ASSESSMENT & PLAN NOTE
- Continue amlodipine 10 mg daily, losartan 100 mg daily, and metoprolol succinate 100 mg daily.  -Blood pressure stable. Reviewed BP target goal with patient.    - Continue to maintain healthy balanced diet with focus on low salt intake. Limit alcohol intake.   - Advised to exercise at least 30 minutes a day for at least 5 days out of the week.

## 2025-06-19 DIAGNOSIS — M62.838 TRAPEZIUS MUSCLE SPASM: ICD-10-CM

## 2025-06-19 DIAGNOSIS — M25.511 ACUTE PAIN OF RIGHT SHOULDER: ICD-10-CM

## 2025-06-19 DIAGNOSIS — M54.2 NECK PAIN: ICD-10-CM

## 2025-06-19 RX ORDER — CYCLOBENZAPRINE HCL 10 MG
TABLET ORAL
Qty: 60 TABLET | Refills: 1 | Status: SHIPPED | OUTPATIENT
Start: 2025-06-19

## 2025-06-19 RX ORDER — IBUPROFEN 400 MG/1
400 TABLET, FILM COATED ORAL EVERY 8 HOURS
Qty: 30 TABLET | Refills: 1 | Status: SHIPPED | OUTPATIENT
Start: 2025-06-19

## 2025-06-28 DIAGNOSIS — M25.511 ACUTE PAIN OF RIGHT SHOULDER: ICD-10-CM

## 2025-06-30 RX ORDER — CYCLOBENZAPRINE HCL 10 MG
TABLET ORAL
Qty: 60 TABLET | Refills: 1 | OUTPATIENT
Start: 2025-06-30

## 2025-08-11 ENCOUNTER — OFFICE VISIT (OUTPATIENT)
Dept: FAMILY MEDICINE CLINIC | Facility: CLINIC | Age: 65
End: 2025-08-11

## 2025-08-21 DIAGNOSIS — M54.2 NECK PAIN: ICD-10-CM

## 2025-08-21 DIAGNOSIS — M62.838 TRAPEZIUS MUSCLE SPASM: ICD-10-CM

## 2025-08-22 RX ORDER — IBUPROFEN 400 MG/1
400 TABLET, FILM COATED ORAL EVERY 8 HOURS
Qty: 30 TABLET | Refills: 2 | OUTPATIENT
Start: 2025-08-22